# Patient Record
Sex: FEMALE | Race: ASIAN | NOT HISPANIC OR LATINO | Employment: STUDENT | URBAN - METROPOLITAN AREA
[De-identification: names, ages, dates, MRNs, and addresses within clinical notes are randomized per-mention and may not be internally consistent; named-entity substitution may affect disease eponyms.]

---

## 2024-05-30 LAB
ABO (EXTERNAL): ABNORMAL
BLD GP AB SCN SERPL QL: ABNORMAL
C TRACH DNA SPEC QL PROBE+SIG AMP: NEGATIVE
HEMATOCRIT (EXTERNAL): 35.7 % (ref 35.5–44.9)
HEPATITIS C ANTIBODY (EXTERNAL): NONREACTIVE
N GONORRHOEA DNA SPEC QL PROBE+SIG AMP: NEGATIVE
RH (EXTERNAL): POSITIVE
RUBELLA ANTIBODY IGG (EXTERNAL): NORMAL
SPECIMEN DESCRIP: NORMAL
SPECIMEN DESCRIPTION: NORMAL

## 2024-09-18 ENCOUNTER — TELEPHONE (OUTPATIENT)
Dept: OBGYN | Facility: CLINIC | Age: 44
End: 2024-09-18
Payer: COMMERCIAL

## 2024-09-18 NOTE — TELEPHONE ENCOUNTER
[facility]    Phone Message    May a detailed message be left on voicemail: yes     Reason for Call: Other: Pt will be moving to [place] from [place] and she will be 30w at the time that she would start her prenatal care. Per protocols, she would need to be accepted before getting scheduled since she will be past 26w. She will be moving to [place] at the end of October. Please contact pt to help schedule.      Action Taken: Message routed to:  Other: RD OB    Travel Screening: Not Applicable     Date of Service:

## 2024-10-06 ENCOUNTER — HEALTH MAINTENANCE LETTER (OUTPATIENT)
Age: 44
End: 2024-10-06

## 2024-10-22 ENCOUNTER — TRANSFERRED RECORDS (OUTPATIENT)
Dept: HEALTH INFORMATION MANAGEMENT | Facility: CLINIC | Age: 44
End: 2024-10-22
Payer: COMMERCIAL

## 2024-10-22 LAB
HEMOGLOBIN (EXTERNAL): 11.4 G/DL (ref 12–16)
HEPATITIS B SURFACE ANTIGEN (EXTERNAL): NEGATIVE
HIV1+2 AB SERPL QL IA: NEGATIVE
PLATELET COUNT (EXTERNAL): 303 10E3/UL (ref 157–371)
VDRL (SYPHILIS) (EXTERNAL): NEGATIVE

## 2024-10-25 ENCOUNTER — TRANSFERRED RECORDS (OUTPATIENT)
Dept: HEALTH INFORMATION MANAGEMENT | Facility: CLINIC | Age: 44
End: 2024-10-25
Payer: COMMERCIAL

## 2024-10-26 ENCOUNTER — MYC MEDICAL ADVICE (OUTPATIENT)
Dept: OBGYN | Facility: CLINIC | Age: 44
End: 2024-10-26
Payer: COMMERCIAL

## 2024-10-28 ENCOUNTER — VIRTUAL VISIT (OUTPATIENT)
Dept: OBGYN | Facility: CLINIC | Age: 44
End: 2024-10-28
Attending: OBSTETRICS & GYNECOLOGY
Payer: COMMERCIAL

## 2024-10-28 DIAGNOSIS — O36.5990: Primary | ICD-10-CM

## 2024-10-28 PROCEDURE — 99207 PR NO BILLABLE SERVICE THIS VISIT: CPT | Mod: 93

## 2024-10-28 RX ORDER — ASPIRIN 81 MG/1
81 TABLET, CHEWABLE ORAL DAILY
COMMUNITY

## 2024-10-28 RX ORDER — HUMAN INSULIN 100 [IU]/ML
8 INJECTION, SUSPENSION SUBCUTANEOUS AT BEDTIME
COMMUNITY
Start: 2024-10-08 | End: 2024-11-04

## 2024-10-28 RX ORDER — LEVOTHYROXINE SODIUM 112 UG/1
112 TABLET ORAL DAILY
COMMUNITY
Start: 2024-07-18 | End: 2024-11-05

## 2024-10-28 NOTE — PROGRESS NOTES
Left message for patient to let her know that an US order was placed.  She can call [phone] to schedule

## 2024-10-28 NOTE — LETTER
10/28/2024       RE: Ravi Peñaloza  [address]  [place] [address]     Dear Colleague,    Thank you for referring your patient, Ravi Peñaloza, to the [facility]. Please see a copy of my visit note below.    [facility] RN Transfer of Care Intake note    44 year old female newly pregnant.  LMP: 24.    exact and regular cycles  Pregnancy is planned.    Patient is transferring care from: [facility] and has had 6 prenatal visits with this clinic. Gestational age at first OB visit with this pregnancy was 7 weeks    The reason the patient is transferring care is: moved to [place]    Partner/support person name and relationship - , Stiven Rubi.        Symptoms since LMP include nausea and vomiting. Patient has tried these relief   measures: increased rest.      OB HISTORY  OB History    Para Term  AB Living   1 0 0 0 0 0   SAB IAB Ectopic Multiple Live Births   0 0 0 0 0      # Outcome Date GA Lbr Se/2nd Weight Sex Type Anes PTL Lv   1 Current               Obstetric Comments   *First Pregnancy           OB COMPLICATIONS  *First Pregnancy       PERSONAL/SOCIAL HISTORY    lives with their spouse.  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  Job involves sedentary activity .  Her partner works as a researcher at [employer] in dept of surgery.     MENTAL HEALTH HISTORY:  none      - Current Medications    Current Outpatient Medications   Medication Sig Dispense Refill     insulin NPH (NOVOLIN N FLEXPEN) 100 UNIT/ML injection Inject 8 Units subcutaneously at bedtime.       levothyroxine (SYNTHROID/LEVOTHROID) 112 MCG tablet Take 112 mcg by mouth daily. Plus additional 1/2 tab weekly       Prenatal MV-Min-Fe Fum-FA-DHA (PRENATAL MULTIVITAMIN PLUS DHA PO) Take 1 tablet by mouth daily.       aspirin (ASA) 81 MG chewable tablet Take 81 mg by mouth daily.              - Co-morbids    Past Medical History:   Diagnosis Date     Fibroid uterus      Gestational diabetes      Hypothyroidism      Migraine without aura and without status migrainosus, not intractable        - Pre pregnancy weight 189 pounds    - Genetic/Infection questionnaire completed, risks include  with sickle cell trait.  AMA. Discussed genetic screening options, patient has completed first trimester genetic screening  Pt  does not have a recent known exposure to Parvo or CMV so IgG/IgM testing WILL NOT be ordered.   - Labs already drawn this pregnancy include: CBC, HBsAG, Syphilis AB, TSH, GTT, AFP, NIPT, HIV 1&2, chlamydia/Gonorrhea,   - Ultrasound done at 8w0d weeks gestation on 5/30/24  Flu Vaccine.  Patient declined, will consider next visit  COVID Vaccine: initial series plus one booster.  Had covid illness in 2021 without complications  RHogam: NA  Tdap: not yet this pregnancy  - Discussed expectations for routine prenatal care and scheduling.  - Discussed highlights from The Expectant Family booklet on warning signs, safe pregnancy and prevention of infections diseases, nutrition and exercise.  - Patient was encouraged to start prenatal vitamins as tolerated, if experiencing nausea and vomiting then OK to switch to folic acid only at this time.    - Additional items: None  - Reconciled and reviewed problem list  - Pt scheduled for NOB on 11/5/24    Allegra Sheets RN            Left message for patient to let her know that an US order was placed.  She can call [phone] to schedule      Again, thank you for allowing me to participate in the care of your patient.      Sincerely,    [facility] N[facility]UC[facility]

## 2024-10-28 NOTE — PROGRESS NOTES
[facility] RN Transfer of Care Intake note    44 year old female newly pregnant.  LMP: 24.    exact and regular cycles  Pregnancy is planned.    Patient is transferring care from: [facility] and has had 6 prenatal visits with this clinic. Gestational age at first OB visit with this pregnancy was 7 weeks    The reason the patient is transferring care is: moved to [place]    Partner/support person name and relationship - , Stiven Rubi.        Symptoms since LMP include nausea and vomiting. Patient has tried these relief   measures: increased rest.      OB HISTORY  OB History    Para Term  AB Living   1 0 0 0 0 0   SAB IAB Ectopic Multiple Live Births   0 0 0 0 0      # Outcome Date GA Lbr Se/2nd Weight Sex Type Anes PTL Lv   1 Current               Obstetric Comments   *First Pregnancy           OB COMPLICATIONS  *First Pregnancy       PERSONAL/SOCIAL HISTORY    lives with their spouse.  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  Job involves sedentary activity .  Her partner works as a researcher at [employer] in dept of surgery.     MENTAL HEALTH HISTORY:  none      - Current Medications    Current Outpatient Medications   Medication Sig Dispense Refill    insulin NPH (NOVOLIN N FLEXPEN) 100 UNIT/ML injection Inject 8 Units subcutaneously at bedtime.      levothyroxine (SYNTHROID/LEVOTHROID) 112 MCG tablet Take 112 mcg by mouth daily. Plus additional 1/2 tab weekly      Prenatal MV-Min-Fe Fum-FA-DHA (PRENATAL MULTIVITAMIN PLUS DHA PO) Take 1 tablet by mouth daily.      aspirin (ASA) 81 MG chewable tablet Take 81 mg by mouth daily.             - Co-morbids    Past Medical History:   Diagnosis Date    Fibroid uterus     Gestational diabetes     Hypothyroidism     Migraine without aura and without status migrainosus, not intractable        - Pre pregnancy weight 189 pounds    - Genetic/Infection questionnaire completed, risks include  with sickle cell  trait.  AMA. Discussed genetic screening options, patient has completed first trimester genetic screening  Pt  does not have a recent known exposure to Parvo or CMV so IgG/IgM testing WILL NOT be ordered.   - Labs already drawn this pregnancy include: CBC, HBsAG, Syphilis AB, TSH, GTT, AFP, NIPT, HIV 1&2, chlamydia/Gonorrhea,   - Ultrasound done at 8w0d weeks gestation on 5/30/24  Flu Vaccine.  Patient declined, will consider next visit  COVID Vaccine: initial series plus one booster.  Had covid illness in 2021 without complications  RHogam: NA  Tdap: not yet this pregnancy  - Discussed expectations for routine prenatal care and scheduling.  - Discussed highlights from The Expectant Family booklet on warning signs, safe pregnancy and prevention of infections diseases, nutrition and exercise.  - Patient was encouraged to start prenatal vitamins as tolerated, if experiencing nausea and vomiting then OK to switch to folic acid only at this time.    - Additional items: None  - Reconciled and reviewed problem list  - Pt scheduled for NOB on 11/5/24    Allegra Sheets RN

## 2024-10-29 ENCOUNTER — TRANSCRIBE ORDERS (OUTPATIENT)
Dept: MATERNAL FETAL MEDICINE | Facility: CLINIC | Age: 44
End: 2024-10-29
Payer: COMMERCIAL

## 2024-10-29 ENCOUNTER — PRE VISIT (OUTPATIENT)
Dept: MATERNAL FETAL MEDICINE | Facility: CLINIC | Age: 44
End: 2024-10-29
Payer: COMMERCIAL

## 2024-10-29 DIAGNOSIS — O26.90 PREGNANCY RELATED CONDITION, ANTEPARTUM: Primary | ICD-10-CM

## 2024-10-30 ENCOUNTER — HOSPITAL ENCOUNTER (OUTPATIENT)
Dept: ULTRASOUND IMAGING | Facility: CLINIC | Age: 44
Discharge: HOME | End: 2024-10-30
Attending: OBSTETRICS & GYNECOLOGY
Payer: COMMERCIAL

## 2024-10-30 ENCOUNTER — OFFICE VISIT (OUTPATIENT)
Dept: MATERNAL FETAL MEDICINE | Facility: CLINIC | Age: 44
End: 2024-10-30
Attending: OBSTETRICS & GYNECOLOGY
Payer: COMMERCIAL

## 2024-10-30 ENCOUNTER — TELEPHONE (OUTPATIENT)
Dept: OBGYN | Facility: CLINIC | Age: 44
End: 2024-10-30

## 2024-10-30 VITALS — SYSTOLIC BLOOD PRESSURE: 125 MMHG | DIASTOLIC BLOOD PRESSURE: 81 MMHG

## 2024-10-30 DIAGNOSIS — D25.9 UTERINE LEIOMYOMA, UNSPECIFIED LOCATION: ICD-10-CM

## 2024-10-30 DIAGNOSIS — O24.414 INSULIN CONTROLLED GESTATIONAL DIABETES MELLITUS (GDM) IN THIRD TRIMESTER: ICD-10-CM

## 2024-10-30 DIAGNOSIS — O09.513 PRIMIGRAVIDA OF ADVANCED MATERNAL AGE IN THIRD TRIMESTER: ICD-10-CM

## 2024-10-30 DIAGNOSIS — E03.9 HYPOTHYROIDISM: ICD-10-CM

## 2024-10-30 DIAGNOSIS — O99.210 OBESITY DURING PREGNANCY: ICD-10-CM

## 2024-10-30 DIAGNOSIS — O36.5990 FETAL GROWTH RESTRICTION ANTEPARTUM: Primary | ICD-10-CM

## 2024-10-30 DIAGNOSIS — O26.90 PREGNANCY RELATED CONDITION, ANTEPARTUM: ICD-10-CM

## 2024-10-30 DIAGNOSIS — O24.414 INSULIN CONTROLLED GESTATIONAL DIABETES MELLITUS (GDM) IN THIRD TRIMESTER: Primary | ICD-10-CM

## 2024-10-30 DIAGNOSIS — O99.283 HYPOTHYROIDISM AFFECTING PREGNANCY IN THIRD TRIMESTER: ICD-10-CM

## 2024-10-30 DIAGNOSIS — E03.9 HYPOTHYROIDISM AFFECTING PREGNANCY IN THIRD TRIMESTER: ICD-10-CM

## 2024-10-30 PROCEDURE — 76811 OB US DETAILED SNGL FETUS: CPT | Mod: 26 | Performed by: STUDENT IN AN ORGANIZED HEALTH CARE EDUCATION/TRAINING PROGRAM

## 2024-10-30 PROCEDURE — 76820 UMBILICAL ARTERY ECHO: CPT | Mod: 26 | Performed by: STUDENT IN AN ORGANIZED HEALTH CARE EDUCATION/TRAINING PROGRAM

## 2024-10-30 PROCEDURE — 59025 FETAL NON-STRESS TEST: CPT

## 2024-10-30 PROCEDURE — 99203 OFFICE O/P NEW LOW 30 MIN: CPT | Mod: 25 | Performed by: STUDENT IN AN ORGANIZED HEALTH CARE EDUCATION/TRAINING PROGRAM

## 2024-10-30 PROCEDURE — 59025 FETAL NON-STRESS TEST: CPT | Mod: 26 | Performed by: STUDENT IN AN ORGANIZED HEALTH CARE EDUCATION/TRAINING PROGRAM

## 2024-10-30 PROCEDURE — 76820 UMBILICAL ARTERY ECHO: CPT

## 2024-10-30 NOTE — NURSING NOTE
Patient reports positive fetal movement, denies contractions, leaking of fluid, or bleeding.  Reports blood sugar values fasting wnl and hr post prandial wnl. Has had difficulty checking all pp while she's been in a conference this week. Patient states she has been followed by endocrine for thyroid as well as diabetes but does not have new endocrine provider set up. Education provided to patient on today's ultrasound.  SBAR given to DANIA IVY, see their note in Epic.  Call placed to [facility] triage RN to request endocrine referral for patient.  NST Performed due to fetal growth restriction.  Dr. Le reviewed efm tracing. See NST/BPP Doc Flowsheet tab.

## 2024-10-30 NOTE — TELEPHONE ENCOUNTER
29w6d today. Telma from [facility] called. She states the patient needs a referral to Endocrine ASAP for hypothyroidism and Insulin-dependent gestational diabetes. She is a ERNIE at 30w6d on 11/5/24 with Dr. Trevizo. But [facility] would like the referral sent now to get the patient in. Will send message to Dr. Trevizo for advise.

## 2024-10-30 NOTE — PROGRESS NOTES
The patient was seen for an ultrasound in the [facility] today.  For a detailed report of the ultrasound examination, please see the ultrasound report which can be found under the imaging tab.    If you have questions regarding today's evaluation or if we can be of further service, please contact the [facility].    Claudette Le MD  , OB/GYN  Maternal-Fetal Medicine

## 2024-11-01 ENCOUNTER — TELEPHONE (OUTPATIENT)
Dept: ENDOCRINOLOGY | Facility: CLINIC | Age: 44
End: 2024-11-01

## 2024-11-01 DIAGNOSIS — O24.419 GESTATIONAL DIABETES: Primary | ICD-10-CM

## 2024-11-04 ENCOUNTER — TELEPHONE (OUTPATIENT)
Dept: ENDOCRINOLOGY | Facility: CLINIC | Age: 44
End: 2024-11-04

## 2024-11-04 ENCOUNTER — VIRTUAL VISIT (OUTPATIENT)
Dept: EDUCATION SERVICES | Facility: CLINIC | Age: 44
End: 2024-11-04
Payer: COMMERCIAL

## 2024-11-04 DIAGNOSIS — O24.419 GESTATIONAL DIABETES: ICD-10-CM

## 2024-11-04 PROCEDURE — 98968 PH1 ASSMT&MGMT NQHP 21-30: CPT | Mod: 95 | Performed by: DIETITIAN, REGISTERED

## 2024-11-04 RX ORDER — HUMAN INSULIN 100 [IU]/ML
8 INJECTION, SUSPENSION SUBCUTANEOUS AT BEDTIME
Qty: 15 ML | Refills: 3 | Status: SHIPPED | OUTPATIENT
Start: 2024-11-04 | End: 2024-11-05

## 2024-11-04 NOTE — PATIENT INSTRUCTIONS
Goals for Gestational Diabetes Care:    1. Eat balanced meals (3 meals + 3 snacks daily)    Breakfast: 30 grams carbohydrate + Protein  Snack: 15-30 grams carbohydrate + protein  Lunch: 45-60 grams carbohydrate + protein/vegetables  Snack: 15-30 grams Carbohydrate + protein  Dinner: 45-60 grams carbohydrate + protein/vegetables  Bedtime Snack: 15-30 grams + protein    ----Make sure you include protein source with each meal and at bedtime - this has been shown to help with blood glucose elevations    2. Each Morning Check Ketones (small/mod/high - call Diabetes Care Line)  Your goal is negative or trace ketones. If you have ketones in your urine it means you are not eating enough before you go to bed. Eat a larger bedtime snack and include protein.     3. Aim to get at least 30 minutes of activity each day. Activity really helps improve blood sugars.     4. Blood Glucose Targets:   1. Fasting Less than 95 mg/dL   2. 1 hours after a meal target is less than 140 mg/dL  ----Always remember to bring meter and log book to all appointments.    5. NPH at 8 units before bed - no changes    Follow up with your Diabetic Educator to assess BG targets in 1 week.  If Blood glucose levels are above normal 3 times or more in one week and you cannot explain them or if you develop small, moderate or high ketones call Diabetes Care at [phone]    Call with any questions.    Thank you,  Miranda Sher RDN, LD, [facility]   Certified Diabetes Care &   [phone]

## 2024-11-04 NOTE — TELEPHONE ENCOUNTER
Patient confirmed scheduled appointment:  Date: 11/5   Time: 9 am   Visit type: new gdm   Provider: Elli   Location: [facility]  Testing/imaging:   Additional notes: Spoke to pt and scheduled soonest avail appt per below triage   Vani Sanchez RN  [facility]; Miranda Calloway MD  CCs: Please help schedule per GDM and Thyroid protocol. CDE referral in place. Transfer of care from [facility].    Dr Calloway, Are you willing to add on as urgent next week? If so, when?    Thank you.  Vani Sanchez RN on 11/1/24 at 10:00 AM    Claudette Espinoza on 11/4/2024 at 9:40 AM

## 2024-11-04 NOTE — CONFIDENTIAL NOTE
RECORDS RECEIVED FROM: internal    DATE RECEIVED: 11.5.24    NOTES (FOR ALL VISITS) STATUS DETAILS   OFFICE NOTES from referring provider internal    Vandana Trevizo MD      MEDICATION LIST internal     LABS     DIABETES: HBGA1C, CREATININE, FASTING LIPIDS, MICROALBUMIN URINE, POTASSIUM, TSH, T4    THYROID: TSH, T4, CBC, THYRODLONULIN, TOTAL T3, FREE T4, CALCITONIN, CEA internal /ce    TSH/T4- 10.20.24  HBGA1C- 6.24.24  Lipid- 6.24.24  Cmp-6.24.24   Cbc- 6.24.24

## 2024-11-04 NOTE — TELEPHONE ENCOUNTER
Spoke with patient. Pt has not been checking blood sugars as regularly due to traveling. Pt states she will send what BG readings she has via [employer]. Jennifer Shipley CMA on 11/4/2024 at 10:18 AM

## 2024-11-04 NOTE — PROGRESS NOTES
Outcome for 11/04/24 10:16 AM: [employer] message sent  Jennifer Shipley MA  Outcome for 11/04/24 10:16 AM: Per patient, will send BG readings via [employer]- Pt has been traveling and states she will not have 14 days but will send what she does have.  Jennifer Shipley MA  Outcome for 11/04/24 2:45 PM: Glucose readings sent via [employer]  Jennifer Shipley MA

## 2024-11-04 NOTE — PROGRESS NOTES
Diabetes Self-Management Education & Support  Type of service:  Video Visit    If the video visit is dropped, the video visit invitation should be resent by: Text to cell phone: [phone]    Originating Location (pt. Location): Home  Distant Location (provider location): Offsite  Mode of Communication:  Video Conference via "[employer]"    Video Start Time:  08:03 AM  Video End Time (time video stopped): 08:24 AM    How would patient like to obtain AVS? Robynhart    SUBJECTIVE/OBJECTIVE:  Presents for education related to gestational diabetes.    Accompanied by: Self  Gestational weeks: 30w4d  Next OB Visit Date: 11/05/24  Number of previous pregnancies: 0  Had any babies over 9 lbs: No  Previously had Gestational Diabetes: No  Have you ever had thyroid problems or taken thyroid medication?: (!) Yes  Heart disease, mitral valve prolapse or rheumatic fever?: No  Hypertension : No  High Cholesterol: No  High Triglycerides: No  Do you use tobacco products?: No  Do you drink beer, wine or hard liquor?: No    Cultural Influences/Ethnic Background:  Not  or     LMP 04/04/2024 (Exact Date)     Weight gain not assessed    Estimated Date of Delivery: Jan 9, 2025    Blood Glucose/Ketone Log:   GDM BG LOG:  DATE Ketones Fasting BG (mg/dL) 1 hour Post Breakfast BG (mg/dL) 1 hour Post Lunch BG (mg/dL) 1 hour post Dinner BG (mg/dL)   11/4 - 83 - - -   11/3 - - - - 106       Lifestyle and Health Behaviors:  Exercise:: Yes  Days per week of moderate to strenuous exercise (like a brisk walk): 7 (20-30 minute walk after each meal)  On average, minutes per day of exercise at this level: 10  How intense was your typical exercise? : Moderate (like brisk walking)  Exercise Minutes per Week: 70  Meal planning/habits: None  Meals include: Breakfast, Lunch, Dinner  Breakfast: eggs and egg whites at meals  Lunch: chicken with flat breads- homemade with chickpea and whole wheat flour OR whole wheat tortillas. Salads and bell  peppers, spring onions  Dinner: chicken with flat breads- homemade with chickpea and whole wheat flour OR whole wheat tortillas. Salads and bell peppers, spring onions  Snacks: sometimes-crackers and almonds.  Beverages: Water  Biggest challenges to healthy eating: None  Pre- vitamin?: Yes  Supplements?: Yes  List supplements currently taking: DHA. baby aspirin 81 mg and thyroids meds  Experiencing nausea?: No  Experiencing heartburn?: No    Healthy Coping:  Emotional response to diabetes: Ready to learn  Informal Support system:: None  Stage of change: ACTION (Actively working towards change)    Current Management:  Taking medications for gestational diabetes?: Yes  Difficulty affording diabetes testing supplies?: No    ASSESSMENT:  Ketones: not checking.   Fasting blood glucoses: 100% in target.  After breakfast: -% in target.  After lunch: -% in target.  After dinner: 100% in target.    Met with Ravi and she is here in [place] now after moving from [place]. She is a Doctoral student in public health here. She is on NPH 8 units/day and her fasting glucose has been well controlled. She reports working with an endocrinologist in [place] who was managing her thyroid medications. She had a dietitian managing her glucose.    Patient apologizing for not having a lot of glucose numbers, she moved and was at a conference in [place] for a week. She reports that her fasting numbers are well controled with the 8 units of NPH. However, her meal time numbers have been well controlled under 140 mg/dL with nutrition choices and activity - she has been walking 20-30 minutes after each meal.     INTERVENTION:  Educational topics covered today:  What to expect after delivery, Future testing for Type 2 diabetes (2 hour OGTT at 6 week post-partum check-up and annual fasting blood glucose level), Risk of GDM and planning ahead for future pregnancies, Recommended lifestyle interventions for reducing the risk of Type 2 Diabetes, When  to Call a Diabetes Educator or OB Provider    Educational Materials provided today:  [employer] Preventing Diabetes    PLAN:  Check glucose 4 times daily.  Check ketones once a week when readings are consistently negative.  Continue with recommended physical activity.  Continue to follow recommended meal plan: 30 carbs at breakfast, 60 carbs at lunch, 60 carbs at supper, 15-30 carbs at snacks.  Follow consistent CHO meal plan, eat CHO and protein/fat at all meals/snacks.    Call/e-mail/MyChart message diabetes educator if 3 or more blood sugars are above the goal in 1 week or if ketones are positive.    Time Spent: 21 minutes  Encounter Type: Individual    Any diabetes medication dose changes were made via the CDE Protocol and Collaborative Practice Agreement with the patient's referring provider. A copy of this encounter was shared with the provider.

## 2024-11-04 NOTE — LETTER
11/4/2024         RE: Ravi Peñaloza  [address]  [place] [address]        Dear Colleague,    Thank you for referring your patient, Ravi Peñaloza, to the [facility]. Please see a copy of my visit note below.    Diabetes Self-Management Education & Support  Type of service:  Video Visit    If the video visit is dropped, the video visit invitation should be resent by: Text to cell phone: [phone]    Originating Location (pt. Location): Home  Distant Location (provider location): Offsite  Mode of Communication:  Video Conference via [employer]    Video Start Time:  08:03 AM  Video End Time (time video stopped): 08:24 AM    How would patient like to obtain AVS? MyChart    SUBJECTIVE/OBJECTIVE:  Presents for education related to gestational diabetes.    Accompanied by: Self  Gestational weeks: 30w4d  Next OB Visit Date: 11/05/24  Number of previous pregnancies: 0  Had any babies over 9 lbs: No  Previously had Gestational Diabetes: No  Have you ever had thyroid problems or taken thyroid medication?: (!) Yes  Heart disease, mitral valve prolapse or rheumatic fever?: No  Hypertension : No  High Cholesterol: No  High Triglycerides: No  Do you use tobacco products?: No  Do you drink beer, wine or hard liquor?: No    Cultural Influences/Ethnic Background:  Not  or     LMP 04/04/2024 (Exact Date)     Weight gain not assessed    Estimated Date of Delivery: Jan 9, 2025    Blood Glucose/Ketone Log:   GDM BG LOG:  DATE Ketones Fasting BG (mg/dL) 1 hour Post Breakfast BG (mg/dL) 1 hour Post Lunch BG (mg/dL) 1 hour post Dinner BG (mg/dL)   11/4 - 83 - - -   11/3 - - - - 106       Lifestyle and Health Behaviors:  Exercise:: Yes  Days per week of moderate to strenuous exercise (like a brisk walk): 7 (20-30 minute walk after each meal)  On average, minutes per day of exercise at this level: 10  How intense was your typical exercise? : Moderate (like brisk  walking)  Exercise Minutes per Week: 70  Meal planning/habits: None  Meals include: Breakfast, Lunch, Dinner  Breakfast: eggs and egg whites at meals  Lunch: chicken with flat breads- homemade with chickpea and whole wheat flour OR whole wheat tortillas. Salads and bell peppers, spring onions  Dinner: chicken with flat breads- homemade with chickpea and whole wheat flour OR whole wheat tortillas. Salads and bell peppers, spring onions  Snacks: sometimes-crackers and almonds.  Beverages: Water  Biggest challenges to healthy eating: None  Pre-linnea vitamin?: Yes  Supplements?: Yes  List supplements currently taking: DHA. baby aspirin 81 mg and thyroids meds  Experiencing nausea?: No  Experiencing heartburn?: No    Healthy Coping:  Emotional response to diabetes: Ready to learn  Informal Support system:: None  Stage of change: ACTION (Actively working towards change)    Current Management:  Taking medications for gestational diabetes?: Yes  Difficulty affording diabetes testing supplies?: No    ASSESSMENT:  Ketones: not checking.   Fasting blood glucoses: 100% in target.  After breakfast: -% in target.  After lunch: -% in target.  After dinner: 100% in target.    Met with Ravi and she is here in [place] now after moving from [place]. She is a Doctoral student in public health here. She is on NPH 8 units/day and her fasting glucose has been well controlled. She reports working with an endocrinologist in [place] who was managing her thyroid medications. She had a dietitian managing her glucose.    Patient apologizing for not having a lot of glucose numbers, she moved and was at a conference in [place] for a week. She reports that her fasting numbers are well controled with the 8 units of NPH. However, her meal time numbers have been well controlled under 140 mg/dL with nutrition choices and activity - she has been walking 20-30 minutes after each meal.     INTERVENTION:  Educational topics covered today:  What to expect  after delivery, Future testing for Type 2 diabetes (2 hour OGTT at 6 week post-partum check-up and annual fasting blood glucose level), Risk of GDM and planning ahead for future pregnancies, Recommended lifestyle interventions for reducing the risk of Type 2 Diabetes, When to Call a Diabetes Educator or OB Provider    Educational Materials provided today:  [employer] Preventing Diabetes    PLAN:  Check glucose 4 times daily.  Check ketones once a week when readings are consistently negative.  Continue with recommended physical activity.  Continue to follow recommended meal plan: 30 carbs at breakfast, 60 carbs at lunch, 60 carbs at supper, 15-30 carbs at snacks.  Follow consistent CHO meal plan, eat CHO and protein/fat at all meals/snacks.    Call/e-mail/[employer] message diabetes educator if 3 or more blood sugars are above the goal in 1 week or if ketones are positive.    Time Spent: 21 minutes  Encounter Type: Individual    Any diabetes medication dose changes were made via the CDE Protocol and Collaborative Practice Agreement with the patient's referring provider. A copy of this encounter was shared with the provider.

## 2024-11-05 ENCOUNTER — PRENATAL OFFICE VISIT (OUTPATIENT)
Dept: OBGYN | Facility: CLINIC | Age: 44
End: 2024-11-05
Attending: OBSTETRICS & GYNECOLOGY
Payer: COMMERCIAL

## 2024-11-05 ENCOUNTER — PRE VISIT (OUTPATIENT)
Dept: ENDOCRINOLOGY | Facility: CLINIC | Age: 44
End: 2024-11-05

## 2024-11-05 ENCOUNTER — VIRTUAL VISIT (OUTPATIENT)
Dept: ENDOCRINOLOGY | Facility: CLINIC | Age: 44
End: 2024-11-05
Attending: OBSTETRICS & GYNECOLOGY
Payer: COMMERCIAL

## 2024-11-05 VITALS
HEIGHT: 61 IN | BODY MASS INDEX: 36.06 KG/M2 | DIASTOLIC BLOOD PRESSURE: 87 MMHG | WEIGHT: 191 LBS | SYSTOLIC BLOOD PRESSURE: 134 MMHG | HEART RATE: 87 BPM

## 2024-11-05 DIAGNOSIS — O34.13 UTERINE FIBROIDS AFFECTING PREGNANCY IN THIRD TRIMESTER: ICD-10-CM

## 2024-11-05 DIAGNOSIS — O36.5990 FETAL GROWTH RESTRICTION ANTEPARTUM: ICD-10-CM

## 2024-11-05 DIAGNOSIS — O24.414 INSULIN CONTROLLED GESTATIONAL DIABETES MELLITUS (GDM) IN THIRD TRIMESTER: Primary | ICD-10-CM

## 2024-11-05 DIAGNOSIS — O24.414 INSULIN CONTROLLED GESTATIONAL DIABETES MELLITUS (GDM) IN THIRD TRIMESTER: ICD-10-CM

## 2024-11-05 DIAGNOSIS — O09.513 AMA (ADVANCED MATERNAL AGE) PRIMIGRAVIDA 35+, THIRD TRIMESTER: ICD-10-CM

## 2024-11-05 DIAGNOSIS — E03.9 ACQUIRED HYPOTHYROIDISM: ICD-10-CM

## 2024-11-05 DIAGNOSIS — O09.93 SUPERVISION OF HIGH RISK PREGNANCY IN THIRD TRIMESTER: Primary | ICD-10-CM

## 2024-11-05 DIAGNOSIS — E06.3 HYPOTHYROIDISM DUE TO HASHIMOTO THYROIDITIS: ICD-10-CM

## 2024-11-05 DIAGNOSIS — D25.9 UTERINE FIBROIDS AFFECTING PREGNANCY IN THIRD TRIMESTER: ICD-10-CM

## 2024-11-05 PROCEDURE — 250N000011 HC RX IP 250 OP 636

## 2024-11-05 PROCEDURE — 90715 TDAP VACCINE 7 YRS/> IM: CPT

## 2024-11-05 PROCEDURE — 90471 IMMUNIZATION ADMIN: CPT

## 2024-11-05 PROCEDURE — 99204 OFFICE O/P NEW MOD 45 MIN: CPT | Mod: 95 | Performed by: INTERNAL MEDICINE

## 2024-11-05 PROCEDURE — 99213 OFFICE O/P EST LOW 20 MIN: CPT | Performed by: OBSTETRICS & GYNECOLOGY

## 2024-11-05 PROCEDURE — 99207 PR PRENATAL VISIT: CPT | Mod: GC | Performed by: OBSTETRICS & GYNECOLOGY

## 2024-11-05 RX ORDER — LEVOTHYROXINE SODIUM 112 UG/1
112 TABLET ORAL DAILY
Qty: 100 TABLET | Refills: 0 | Status: SHIPPED | OUTPATIENT
Start: 2024-11-05

## 2024-11-05 RX ORDER — LEVOTHYROXINE SODIUM 88 UG/1
88 TABLET ORAL DAILY
Qty: 90 TABLET | Refills: 3 | Status: SHIPPED | OUTPATIENT
Start: 2024-11-05

## 2024-11-05 RX ORDER — HUMAN INSULIN 100 [IU]/ML
8 INJECTION, SUSPENSION SUBCUTANEOUS AT BEDTIME
Qty: 15 ML | Refills: 3 | Status: SHIPPED | OUTPATIENT
Start: 2024-11-05

## 2024-11-05 ASSESSMENT — EDINBURGH POSTNATAL DEPRESSION SCALE (EPDS)
I HAVE BEEN ABLE TO LAUGH AND SEE THE FUNNY SIDE OF THINGS: AS MUCH AS I ALWAYS COULD
I HAVE FELT SAD OR MISERABLE: NO, NOT AT ALL
I HAVE FELT SCARED OR PANICKY FOR NO GOOD REASON: NO, NOT AT ALL
I HAVE BEEN SO UNHAPPY THAT I HAVE BEEN CRYING: NO, NEVER
I HAVE BEEN SO UNHAPPY THAT I HAVE HAD DIFFICULTY SLEEPING: NOT AT ALL
I HAVE BEEN ANXIOUS OR WORRIED FOR NO GOOD REASON: NO, NOT AT ALL
I HAVE BLAMED MYSELF UNNECESSARILY WHEN THINGS WENT WRONG: NO, NEVER
I HAVE BEEN SO UNHAPPY THAT I HAVE BEEN CRYING: NO, NEVER
I HAVE BEEN ABLE TO LAUGH AND SEE THE FUNNY SIDE OF THINGS: AS MUCH AS I ALWAYS COULD
TOTAL SCORE: 0
THINGS HAVE BEEN GETTING ON TOP OF ME: NO, I HAVE BEEN COPING AS WELL AS EVER
I HAVE BEEN ANXIOUS OR WORRIED FOR NO GOOD REASON: NO, NOT AT ALL
I HAVE BEEN SO UNHAPPY THAT I HAVE HAD DIFFICULTY SLEEPING: NOT AT ALL
I HAVE FELT SCARED OR PANICKY FOR NO GOOD REASON: NO, NOT AT ALL
THINGS HAVE BEEN GETTING ON TOP OF ME: NO, I HAVE BEEN COPING AS WELL AS EVER
THE THOUGHT OF HARMING MYSELF HAS OCCURRED TO ME: NEVER
THE THOUGHT OF HARMING MYSELF HAS OCCURRED TO ME: NEVER
I HAVE BLAMED MYSELF UNNECESSARILY WHEN THINGS WENT WRONG: NO, NEVER
I HAVE LOOKED FORWARD WITH ENJOYMENT TO THINGS: AS MUCH AS I EVER DID
I HAVE LOOKED FORWARD WITH ENJOYMENT TO THINGS: AS MUCH AS I EVER DID
I HAVE FELT SAD OR MISERABLE: NO, NOT AT ALL

## 2024-11-05 NOTE — PROGRESS NOTES
"  Video-Visit Details    Type of service:  Video Visit  Video Start Time: 9:03  Video End Time:9:30  Originating Location (pt. Location): Home, [place]  Distant Location (provider location):  Home  Platform used for Video Visit: Quentin Calloway MD    Ravi Peñaloza is a 44 year old year old female here for evaluation of  diabetes in pregnancy and hypothyroidism via a billable video visit. She was referred by Dr Vandana Trevizo    Currently: 30w5d  Estimated Date of Delivery: Jan 9, 2025  Baby: girl      1) Diabetes Mellitus in pregnancy    Diabetes History:  Diagnosis: No previous history of DM, but was overweight so tested for GDM at 16w and diagnosed with this. Initially managed with dietary changes, in October noted fasting was becoming elevated. Started on NPH 8u at bedtime at that time. Was living in [place] and moved now to [place]. Has been diagnosed with fetal growth restriction (ultrasound 10/30-- EFW 3%)  Hospitalizations: none  Previous Regimens:   Current Regimen: NPH 8u nightly      BG check- AM and 1hr PP   Trends- see glucose log  Am today- 87        2) Hypothyroid  HPI: Diagnosed in 2008- on LT4 since there  Current regimen: 112mcg daily plus 1/2 tablet on Tuesday Ave daily dose 120mcg  Pre-pregnancy- 88mcg    [facility] [place] [facility]  TSH- 0.823  Free T4- 1.14    BP Readings from Last 3 Encounters:   10/30/24 125/81       No results found for: \"A1C\"    Wt Readings from Last 3 Encounters:   No data found for Wt       Current Outpatient Medications   Medication Sig Dispense Refill    acetone urine (KETOSTIX) test strip Check ketones once daily in urine 100 strip 2    aspirin (ASA) 81 MG chewable tablet Take 81 mg by mouth daily.      insulin NPH (NOVOLIN N FLEXPEN) 100 UNIT/ML injection Inject 8 Units subcutaneously at bedtime. 15 mL 3    levothyroxine (SYNTHROID/LEVOTHROID) 112 MCG tablet Take 112 mcg by mouth daily. Plus additional 1/2 tab weekly      Prenatal " MV-Min-Fe Fum-FA-DHA (PRENATAL MULTIVITAMIN PLUS DHA PO) Take 1 tablet by mouth daily.            REVIEW OF SYSTEMS:   ROS: 10 point ROS neg other than the symptoms noted above in the HPI.      EXAM:  Physical Exam (visual exam)  VS:  no vital signs taken for video visit  CONSTITUTIONAL: healthy, alert and NAD, responding appropriately  ENT: normocephalic, no visual evidence of trauma, normal nose and oral mucosa  EYES: conjunctivae and sclerae normal, no exophthalmos or proptosis  THYROID:  no visualized nodules or goiter  LUNGS: no audible wheeze, cough or visible cyanosis, no visible retractions or increased work of breathing  EXTREMITIES: no hand tremors  NEUROLOGY: cranial nerves grossly intact with no obvious deficit.  SKIN:  no visualized skin lesions or rash, no edema visualized  PSYCH: mentation appears normal, normal judgement      ASSESSMENT/PLAN:  (O24.414) Insulin controlled gestational diabetes mellitus (GDM) in third trimester  (E03.9) Hypothyroidism    1) Diabetes Mellitus in pregnancy  - Glucose Control- at goal, continue current regimen. NPH 8u  - Reviewed blood sugar goals in pregnancy. Glucose goals during pregnancy according to the American Diabetes Association:  Fasting glucose 70-95 mg/dL AND  One-hour postprandial glucose 110-140 mg/dL or  Two-hour postprandial glucose 100-120 mg/dL  - Encouraged at least 30min of activity daily.  - She IS/ following with MFM and will bereceiving twice weekly BPP/NST beginning at 32 weeks gestation.  - We reviewed her BG are at target now, no need for additional insulin. Discussed general trend of rise in insulin needs and then stabilization by week 36  - Reviewed if she has new/persistent hypoglycemia, she should notify OB given concern for placental insufficiency  - We reviewed risks of uncontrolled hyperglycemia during pregnancy, including but not limited to: gestational hypertension/preeclampsia, increased cesarian section rate, macrosomia, shoulder  dystocia, still birth, and long term risk of development of diabetes in grown child    At delivery- take insulin as scheduled if planned for induction/     After delivery, stop all insulins.  Continue checking blood sugars once daily at rotating times.   6-12 weeks after delivery, complete 2hr oral glucose tolerance test. Order is in, you need to schedule lab appointment for this time period    I will see you back after that test is complete to interpret the results and discuss next steps.   Please let me know if you have any questions in the meantime or you see elevations in your daily blood sugar checks.     Glucose goals according to the American Diabetes Association (non-pregnancy):  --Pre-meal (including fasting, before meals):  mg/dl  --2 hours after eating: <180 mg/dl, ideally 100-150 mg/dl    2) Hypothyroidism  - At goal based on recent TFTs  - Continue current regimen 112mcg daily plus 1/2 tab on Tuesday  - After delivery, restart pre-pregnancy regimen at 88mcg  - Retest levels 6 weeks post partum    RTC- 6 weeks post partum. Follow weekly with diabetes educator.      A total of 40 minutes were spent today 24 on this visit including chart review, history and counseling, documentation and other activities as detailed above.

## 2024-11-05 NOTE — LETTER
11/5/2024       RE: Ravi Peñaloza  [address]  [place] [address]     Dear Colleague,    Thank you for referring your patient, Ravi Peñaloza, to the [facility] [place] at [facility]. Please see a copy of my visit note below.    Outcome for 11/04/24 10:16 AM: [employer] message sent  Jennifer Shipley MA  Outcome for 11/04/24 10:16 AM: Per patient, will send BG readings via [employer]- Pt has been traveling and states she will not have 14 days but will send what she does have.  Jennifer Shipley MA  Outcome for 11/04/24 2:45 PM: Glucose readings sent via [employer]  Jennifer Shipley MA                Video-Visit Details    Type of service:  Video Visit  Video Start Time: 9:03  Video End Time:9:30  Originating Location (pt. Location): [place], [place]  Distant Location (provider location):  Home  Platform used for Video Visit: Quentin Calloway MD    Ravi Peñaloza is a 44 year old year old female here for evaluation of  diabetes in pregnancy and hypothyroidism via a billable video visit. She was referred by Dr Vandana Trevizo    Currently: 30w5d  Estimated Date of Delivery: Jan 9, 2025  Baby: girl      1) Diabetes Mellitus in pregnancy    Diabetes History:  Diagnosis: No previous history of DM, but was overweight so tested for GDM at 16w and diagnosed with this. Initially managed with dietary changes, in October noted fasting was becoming elevated. Started on NPH 8u at bedtime at that time. Was living in [place] and moved now to [place]. Has been diagnosed with fetal growth restriction (ultrasound 10/30-- EFW 3%)  Hospitalizations: none  Previous Regimens:   Current Regimen: NPH 8u nightly      BG check- AM and 1hr PP   Trends- see glucose log  Am today- 87        2) Hypothyroid  HPI: Diagnosed in 2008- on LT4 since there  Current regimen: 112mcg daily plus 1/2 tablet on Tuesday Ave daily dose 120mcg  Pre-pregnancy-  "88mcg    [facility]20/[facility]- [facility]  TSH- 0.823  Free T4- 1.14    BP Readings from Last 3 Encounters:   10/30/24 125/81       No results found for: \"A1C\"    Wt Readings from Last 3 Encounters:   No data found for Wt       Current Outpatient Medications   Medication Sig Dispense Refill     acetone urine (KETOSTIX) test strip Check ketones once daily in urine 100 strip 2     aspirin (ASA) 81 MG chewable tablet Take 81 mg by mouth daily.       insulin NPH (NOVOLIN N FLEXPEN) 100 UNIT/ML injection Inject 8 Units subcutaneously at bedtime. 15 mL 3     levothyroxine (SYNTHROID/LEVOTHROID) 112 MCG tablet Take 112 mcg by mouth daily. Plus additional 1/2 tab weekly       Prenatal MV-Min-Fe Fum-FA-DHA (PRENATAL MULTIVITAMIN PLUS DHA PO) Take 1 tablet by mouth daily.            REVIEW OF SYSTEMS:   ROS: 10 point ROS neg other than the symptoms noted above in the HPI.      EXAM:  Physical Exam (visual exam)  VS:  no vital signs taken for video visit  CONSTITUTIONAL: healthy, alert and NAD, responding appropriately  ENT: normocephalic, no visual evidence of trauma, normal nose and oral mucosa  EYES: conjunctivae and sclerae normal, no exophthalmos or proptosis  THYROID:  no visualized nodules or goiter  LUNGS: no audible wheeze, cough or visible cyanosis, no visible retractions or increased work of breathing  EXTREMITIES: no hand tremors  NEUROLOGY: cranial nerves grossly intact with no obvious deficit.  SKIN:  no visualized skin lesions or rash, no edema visualized  PSYCH: mentation appears normal, normal judgement      ASSESSMENT/PLAN:  (O24.414) Insulin controlled gestational diabetes mellitus (GDM) in third trimester  (E03.9) Hypothyroidism    1) Diabetes Mellitus in pregnancy  - Glucose Control- at goal, continue current regimen. NPH 8u  - Reviewed blood sugar goals in pregnancy. Glucose goals during pregnancy according to the American Diabetes Association:  Fasting glucose 70-95 mg/dL AND  One-hour " postprandial glucose 110-140 mg/dL or  Two-hour postprandial glucose 100-120 mg/dL  - Encouraged at least 30min of activity daily.  - She IS/ following with MFM and will bereceiving twice weekly BPP/NST beginning at 32 weeks gestation.  - We reviewed her BG are at target now, no need for additional insulin. Discussed general trend of rise in insulin needs and then stabilization by week 36  - Reviewed if she has new/persistent hypoglycemia, she should notify OB given concern for placental insufficiency  - We reviewed risks of uncontrolled hyperglycemia during pregnancy, including but not limited to: gestational hypertension/preeclampsia, increased cesarian section rate, macrosomia, shoulder dystocia, still birth, and long term risk of development of diabetes in grown child    At delivery- take insulin as scheduled if planned for induction/     After delivery, stop all insulins.  Continue checking blood sugars once daily at rotating times.   6-12 weeks after delivery, complete 2hr oral glucose tolerance test. Order is in, you need to schedule lab appointment for this time period    I will see you back after that test is complete to interpret the results and discuss next steps.   Please let me know if you have any questions in the meantime or you see elevations in your daily blood sugar checks.     Glucose goals according to the American Diabetes Association (non-pregnancy):  --Pre-meal (including fasting, before meals):  mg/dl  --2 hours after eating: <180 mg/dl, ideally 100-150 mg/dl    2) Hypothyroidism  - At goal based on recent TFTs  - Continue current regimen 112mcg daily plus 1/2 tab on Tuesday  - After delivery, restart pre-pregnancy regimen at 88mcg  - Retest levels 6 weeks post partum    RTC- 6 weeks post partum. Follow weekly with diabetes educator.      A total of 40 minutes were spent today 24 on this visit including chart review, history and counseling, documentation and other  activities as detailed above.             Again, thank you for allowing me to participate in the care of your patient.      Sincerely,    Miranda Calloway MD

## 2024-11-05 NOTE — PROGRESS NOTES
"[facility]  Return OB Visit    S: Ravi Peñaloza is a 44 year old  at 30w5d by LMP who is here for an OB visit transferring care from [place].  States she is doing well overall.  She is being followed for gestational diabetes, taking 8 units of insulin before bed.  She has been measuring her blood sugars at home, over the last few days these have been normal.  She is also being followed for hypothyroidism and has adjusted her medications as advised by endocrinology.  She reports feeling tired over the last 3 days, which is new for her.  She also has been experiencing carpal tunnel symptoms in both of her hands, left>right.  She wears a brace on both of her wrists while she sleeps, which somewhat helps.  She has a history of migraines prior to pregnancy, has intermittently had headaches that resolve with or without Tylenol.  She otherwise denies vision changes, chest pain, shortness of breath, right upper quadrant pain.  She has been experiencing some swelling in her legs up to her mid shins. Denies vaginal bleeding, vaginal discharge, LOF, contractions.  Reports good fetal movement.      She is wondering if we offer parenting classes.  She is interested in a labor and delivery tour.  She is curious about pediatricians in the area.  Also wondering about what the overall cost of delivery is going to be.    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  Gen: Well-appearing, NAD  Extre: 1-2+ pitting edema in bilateral LE  Fundal Height: 27cm  FHR: 120-130s    A/P:  44 year old  at 30w5d who is here for a return OB visit.    # Prenatal care  - Rh positive, Antibody negative  - RPR negative, HIV negative, Hep C negative, Hep B sAg negative   - Rubella, Hep B " immune  - Immunizations: S/p Tdap today, RSV at 32 weeks. Declines Flu and COVID  - GBS to be performed at 36 weeks    # Genetic screening &  diagnosis  - NIPT low risk, AFP screen negative  - Level 2 US 2024: normal anatomy    # GDMA2  Has been on insulin since 26 weeks. Currently on 8U insulin. Follows with endocrinology.   - Continue checking blood sugar at home   - Planning for twice weekly  surveillance (NST/UA Doppler once per week, biophysical profile once per week) due to GDMA2      # Fetal growth restriction   - US 10/30: growth parameters and estimated fetal weight were consistent with FGR, EFW 5% with AC 3%   - Scheduled for growth US on , planning for fetal growth US q3 + weekly UA dopplers   - Timing of delivery to depend on Doppler findings, amniotic fluid, fetal monitoring, and interval fetal growth  - Discussed diagnosis with patient at length      # Hypothyroidism  Follows with endocrinology.   - At goal based on recent labs   - Continue current regimen 112mcg daily plus 1/2 tab on Tuesday  - After delivery, restart pre-pregnancy regimen at 88mcg  - Retest levels 6 weeks post partum    # Fibroid   Left lateral fibroid measuring 15.6 x 12.6 x 8.6 cm.     # Delivery planning  - Given resources for parenting classes and given pamphlet for L&D tours   - Discussed [facility]   - Given number to [employer] to ask about cost of delivery. Also discussed that she should continue to reach out to her insurance for more information.    Return to clinic in 2 weeks. Discussed return precautions.    Staffed with Dr. Trevizo.    Irena Hanna MD  Obstetrics & Gynecology, PGY-1  5:22 PM 2024    The patient was seen and evaluated with Dr. Hanna.  I have reviewed and edited the above note.     Vandana Trevizo MD, FACOG

## 2024-11-05 NOTE — NURSING NOTE
Current patient location: [address] [place] [address] [facility] [address]    Is the patient currently in the state of [place]? YES    Visit mode:VIDEO    If the visit is dropped, the patient can be reconnected by: VIDEO VISIT: Send to e-mail at: [email or web address]    Will anyone else be joining the visit? NO  (If patient encounters technical issues they should call [phone])    Are changes needed to the allergy or medication list? Pt stated no med changes    Are refills needed on medications prescribed by this physician? NO    Rooming Documentation:  Not applicable    Reason for visit: Consult (NEW GDM- N/ P- Currently using insulin, 30 weeks )    Analia CUMMINGS

## 2024-11-05 NOTE — PATIENT INSTRUCTIONS
Weeks 26 to 30 of Your Pregnancy: Care Instructions  You're starting your last trimester. You'll probably feel your baby moving around more. Your back may ache as your body gets used to your baby's size and length. Take care of yourself, and pay attention to what your body needs.    Talk to your doctor about getting the Tdap shot. It will help protect your  against whooping cough (pertussis). Also ask your doctor about flu and COVID-19 shots if you haven't had them yet. If your blood type is Rh negative, you may be given a shot of Rh immune globulin (such as RhoGAM). It can help prevent problems for your baby.   You may have [place] contractions. They are single or several strong contractions without a pattern. These are practice contractions but not the start of labor.   Be kind to yourself.       Take breaks when you're tired.  Change positions often. Don't sit for too long or stand for too long.  At work, rest during breaks if you can. If you don't get breaks, talk to your doctor about writing a letter to your employer to request them.  Avoid fumes, chemicals, and tobacco smoke.  Be sexual if you want to.       You may be interested in sex, or you may not. Everyone is different.  Sex is okay unless your doctor tells you not to.  Your belly can make it hard to find good positions for sex. [place] and explore.  Watch for signs of  labor.        These signs include:  Menstrual-like cramps. Or you may have pain or pressure in your pelvis that happens in a pattern.  About 6 or more contractions in an hour (even after rest and a glass of water).  A low, dull backache that doesn't go away when you change positions.  An increase or change in vaginal discharge.  Light vaginal bleeding or spotting.  Your water breaking.  Know what to do if you think you are having contractions.       Drink 1 or 2 glasses of water.  Lie down on your left side for at least an hour.  While on your side, feel the top of  "your belly to see if it's tight.  Write down your contractions for an hour. Time how long it is from the start of one contraction to the start of the next.  Call your doctor if you have regular contractions.  Follow-up care is a key part of your treatment and safety. Be sure to make and go to all appointments, and call your doctor if you are having problems. It's also a good idea to know your test results and keep a list of the medicines you take.  Where can you learn more?  Go to https://www.[employer].net/patiented  Enter S999 in the search box to learn more about \"Weeks 26 to 30 of Your Pregnancy: Care Instructions.\"  Current as of: July 10, 2023  Content Version: 14.2 2024 [employer].   Care instructions adapted under license by your healthcare professional. If you have questions about a medical condition or this instruction, always ask your healthcare professional. Healthwise, Incorporated disclaims any warranty or liability for your use of this information.    Weeks 30 to 32 of Your Pregnancy: Care Instructions  Your baby is growing more every day. Its eyes can open and close, and it may have hair on its head. Your baby may sleep 20 to 45 minutes at a time and is more active at certain times.    You should feel your baby move several times every day. Your baby now turns less and kicks more.   This is a good time to tour your hospital or birthing center. You may also want to find childcare if needed.         To ease heartburn   Avoid foods that make your symptoms worse, such as chocolate, spicy foods, and caffeine.  Avoid bending over or lying down after meals.  Do not eat for 2 hours before bedtime.  Take antacids like Tums, but don't take ones that have sodium bicarbonate, magnesium trisilicate, or aspirin.        To care for large, swollen veins (varicose veins)   Try to avoid standing for long periods of time.  Sit with your feet propped up.  Wear support hose.  Get some exercise every day, " "like walking or swimming.  Counting your baby's kicks  Your doctor may ask you to count your baby's movements, such as kicks, flutters, or rolls.    Find a quiet place, and get comfortable. Write down your start time. Count your baby's movements (except hiccups). When your baby has moved 10 times, you can stop counting. Write down how many minutes it took.   If an hour goes by and you don't feel 10 movements, have something to eat or drink. Count for another hour. If you don't feel at least 10 movements in the 2-hour period, call your doctor.   Follow-up care is a key part of your treatment and safety. Be sure to make and go to all appointments, and call your doctor if you are having problems. It's also a good idea to know your test results and keep a list of the medicines you take.  Where can you learn more?  Go to https://www.[employer].net/patiented  Enter X471 in the search box to learn more about \"Weeks 30 to 32 of Your Pregnancy: Care Instructions.\"  Current as of: July 10, 2023  Content Version: 14.2 2024 [employer].   Care instructions adapted under license by your healthcare professional. If you have questions about a medical condition or this instruction, always ask your healthcare professional. Healthwise, Incorporated disclaims any warranty or liability for your use of this information.    Learning About Birth Control After Childbirth  What is birth control?  Birth control is any method used to prevent pregnancy. If you have vaginal sex without birth control, you could get pregnant--even if you haven't started having periods again. You're less likely to get pregnant while breastfeeding, but it's still possible. Finding birth control that works for you can help avoid an unplanned pregnancy.  There are many kinds of birth control. Each has pros and cons. Find what works for you. Talk to your doctor if you've just given birth or are breastfeeding.    Long-acting reversible contraception " "(LARC). These are placed inside your body by a doctor. They can prevent pregnancy for years.  Examples include:  An implant (hormonal).  Copper intrauterine device (IUD).  Hormonal IUDs.   Short-acting hormonal methods. These release hormones. Examples include:  Combination birth control pills (\"the pill\").  Skin patches.  A vaginal ring.  A shot.  Mini-pills. Choose progestin-only options soon after giving birth.     Barrier methods. Use these every time you have vaginal sex.  Examples include:  External (male) condoms.  Internal (female) condoms.  Diaphragms.  Cervical caps.  Sponges.   Spermicides. These kill sperm or stop sperm from moving. They can be gels, creams, foams, films, or tablets. Use them before vaginal sex.  Examples include:  Nonoxynol-9.  pH regulator gel.     Permanent birth control (sterilization). This can be an option if you're sure that you don't want to get pregnant later.  Examples include:  Vasectomy.  Having tubes tied (tubal ligation).   Emergency contraception. This is a backup method. Use it if you didn't use birth control or your birth control method failed.  Examples include:  Copper and hormonal IUDs.  Emergency contraceptive pills.     Fertility awareness. You'll learn when you are most likely to become pregnant (are fertile). You can avoid vaginal sex at that time.  It's also called:  Natural family planning.  The rhythm method.   Breastfeeding. This is most effective when all of these are true:  Your baby is younger than 6 months old.  You're breastfeeding and not bottle-feeding at all.  You aren't having periods.   Follow-up care is a key part of your treatment and safety. Be sure to make and go to all appointments, and call your doctor if you are having problems. It's also a good idea to know your test results and keep a list of the medicines you take.  Where can you learn more?  Go to https://www.healthwise.net/patiented  Enter X408 in the search box to learn more about " "\"Learning About Birth Control After Childbirth.\"  Current as of: July 10, 2023  Content Version: 14.2 2024 [facility] [employer], [facility].   Care instructions adapted under license by your healthcare professional. If you have questions about a medical condition or this instruction, always ask your healthcare professional. Healthwise, Incorporated disclaims any warranty or liability for your use of this information.    "

## 2024-11-06 ENCOUNTER — OFFICE VISIT (OUTPATIENT)
Dept: MATERNAL FETAL MEDICINE | Facility: CLINIC | Age: 44
End: 2024-11-06
Attending: OBSTETRICS & GYNECOLOGY
Payer: COMMERCIAL

## 2024-11-06 ENCOUNTER — HOSPITAL ENCOUNTER (OUTPATIENT)
Dept: ULTRASOUND IMAGING | Facility: CLINIC | Age: 44
Discharge: HOME | End: 2024-11-06
Attending: OBSTETRICS & GYNECOLOGY
Payer: COMMERCIAL

## 2024-11-06 VITALS — SYSTOLIC BLOOD PRESSURE: 133 MMHG | DIASTOLIC BLOOD PRESSURE: 83 MMHG

## 2024-11-06 DIAGNOSIS — O36.5990 FETAL GROWTH RESTRICTION ANTEPARTUM: ICD-10-CM

## 2024-11-06 DIAGNOSIS — O36.5990 FETAL GROWTH RESTRICTION ANTEPARTUM: Primary | ICD-10-CM

## 2024-11-06 PROCEDURE — 76815 OB US LIMITED FETUS(S): CPT | Mod: 26 | Performed by: OBSTETRICS & GYNECOLOGY

## 2024-11-06 PROCEDURE — 59025 FETAL NON-STRESS TEST: CPT

## 2024-11-06 PROCEDURE — 76820 UMBILICAL ARTERY ECHO: CPT | Mod: 26 | Performed by: OBSTETRICS & GYNECOLOGY

## 2024-11-06 PROCEDURE — 76820 UMBILICAL ARTERY ECHO: CPT

## 2024-11-06 PROCEDURE — 59025 FETAL NON-STRESS TEST: CPT | Mod: 26 | Performed by: OBSTETRICS & GYNECOLOGY

## 2024-11-06 NOTE — NURSING NOTE
Patient reports positive fetal movement, denies contractions, leaking of fluid, or bleeding.  Reports blood sugar values fasting and hr post prandial wnl.  Blood pressure today 133/83. Patient states is was the same yesterday at her OB appointment but is normally under 120/80's. Patient denies headache, visual changes, nausea/vomiting, epigastric pain related to preeclampsia.  Education provided to patient on today's ultrasound.  SBAR given to MFARIANE IVY, see their note in Epic.    NST Performed due to FGR.  Dr. Moore reviewed efm tracing. See NST/BPP Doc Flowsheet tab.

## 2024-11-07 ENCOUNTER — MYC MEDICAL ADVICE (OUTPATIENT)
Dept: OBGYN | Facility: CLINIC | Age: 44
End: 2024-11-07
Payer: COMMERCIAL

## 2024-11-07 PROBLEM — D25.9 UTERINE FIBROIDS AFFECTING PREGNANCY IN THIRD TRIMESTER: Status: ACTIVE | Noted: 2024-11-07

## 2024-11-07 PROBLEM — O36.5990 FETAL GROWTH RESTRICTION ANTEPARTUM: Status: ACTIVE | Noted: 2024-11-07

## 2024-11-07 PROBLEM — O34.13 UTERINE FIBROIDS AFFECTING PREGNANCY IN THIRD TRIMESTER: Status: ACTIVE | Noted: 2024-11-07

## 2024-11-07 PROBLEM — O09.513 AMA (ADVANCED MATERNAL AGE) PRIMIGRAVIDA 35+, THIRD TRIMESTER: Status: ACTIVE | Noted: 2024-11-07

## 2024-11-07 PROBLEM — O24.414 INSULIN CONTROLLED GESTATIONAL DIABETES MELLITUS (GDM) IN THIRD TRIMESTER: Status: ACTIVE | Noted: 2024-11-07

## 2024-11-07 PROBLEM — E03.9 ACQUIRED HYPOTHYROIDISM: Status: ACTIVE | Noted: 2024-11-07

## 2024-11-07 PROBLEM — O09.93 SUPERVISION OF HIGH RISK PREGNANCY IN THIRD TRIMESTER: Status: ACTIVE | Noted: 2024-11-07

## 2024-11-08 ENCOUNTER — LAB (OUTPATIENT)
Dept: LAB | Facility: CLINIC | Age: 44
End: 2024-11-08
Attending: STUDENT IN AN ORGANIZED HEALTH CARE EDUCATION/TRAINING PROGRAM
Payer: COMMERCIAL

## 2024-11-08 ENCOUNTER — ALLIED HEALTH/NURSE VISIT (OUTPATIENT)
Dept: OBGYN | Facility: CLINIC | Age: 44
End: 2024-11-08
Attending: STUDENT IN AN ORGANIZED HEALTH CARE EDUCATION/TRAINING PROGRAM
Payer: COMMERCIAL

## 2024-11-08 VITALS — DIASTOLIC BLOOD PRESSURE: 88 MMHG | WEIGHT: 191.2 LBS | SYSTOLIC BLOOD PRESSURE: 124 MMHG | BODY MASS INDEX: 36.13 KG/M2

## 2024-11-08 DIAGNOSIS — O09.93 SUPERVISION OF HIGH RISK PREGNANCY IN THIRD TRIMESTER: ICD-10-CM

## 2024-11-08 DIAGNOSIS — O09.93 SUPERVISION OF HIGH RISK PREGNANCY IN THIRD TRIMESTER: Primary | ICD-10-CM

## 2024-11-08 LAB
ALBUMIN MFR UR ELPH: 18.4 MG/DL
ALT SERPL W P-5'-P-CCNC: 39 U/L (ref 0–50)
ANION GAP SERPL CALCULATED.3IONS-SCNC: 11 MMOL/L (ref 7–15)
AST SERPL W P-5'-P-CCNC: 23 U/L (ref 0–45)
BUN SERPL-MCNC: 8.1 MG/DL (ref 6–20)
CALCIUM SERPL-MCNC: 9.4 MG/DL (ref 8.8–10.4)
CHLORIDE SERPL-SCNC: 107 MMOL/L (ref 98–107)
CREAT SERPL-MCNC: 0.74 MG/DL (ref 0.51–0.95)
CREAT UR-MCNC: 198.3 MG/DL
EGFRCR SERPLBLD CKD-EPI 2021: >90 ML/MIN/1.73M2
GLUCOSE SERPL-MCNC: 87 MG/DL (ref 70–99)
HCO3 SERPL-SCNC: 20 MMOL/L (ref 22–29)
POTASSIUM SERPL-SCNC: 4.4 MMOL/L (ref 3.4–5.3)
PROT/CREAT 24H UR: 0.09 MG/MG CR (ref 0–0.2)
SODIUM SERPL-SCNC: 138 MMOL/L (ref 135–145)
URATE SERPL-MCNC: 5.2 MG/DL (ref 2.4–5.7)

## 2024-11-08 PROCEDURE — 84156 ASSAY OF PROTEIN URINE: CPT

## 2024-11-08 PROCEDURE — 36415 COLL VENOUS BLD VENIPUNCTURE: CPT

## 2024-11-08 PROCEDURE — 84460 ALANINE AMINO (ALT) (SGPT): CPT

## 2024-11-08 PROCEDURE — 84550 ASSAY OF BLOOD/URIC ACID: CPT

## 2024-11-08 PROCEDURE — 80048 BASIC METABOLIC PNL TOTAL CA: CPT

## 2024-11-08 PROCEDURE — 82947 ASSAY GLUCOSE BLOOD QUANT: CPT

## 2024-11-08 PROCEDURE — 84450 TRANSFERASE (AST) (SGOT): CPT

## 2024-11-08 NOTE — PROGRESS NOTES
Ravi reports that her blood pressure at home was elevated in the 140's/80's, so she came to clinic for a recheck.    Denies any headache, visual disturbances, RUQ pain, increased edema or SOB.    Blood pressures today in clinic average 121/87.    Discussed with Dr Estevez, and decision made to collect baseline pre-e labs

## 2024-11-11 ENCOUNTER — HOSPITAL ENCOUNTER (OUTPATIENT)
Dept: ULTRASOUND IMAGING | Facility: CLINIC | Age: 44
Discharge: HOME | End: 2024-11-11
Attending: OBSTETRICS & GYNECOLOGY
Payer: COMMERCIAL

## 2024-11-11 ENCOUNTER — TELEPHONE (OUTPATIENT)
Dept: OBGYN | Facility: CLINIC | Age: 44
End: 2024-11-11

## 2024-11-11 ENCOUNTER — OFFICE VISIT (OUTPATIENT)
Dept: MATERNAL FETAL MEDICINE | Facility: CLINIC | Age: 44
End: 2024-11-11
Attending: OBSTETRICS & GYNECOLOGY
Payer: COMMERCIAL

## 2024-11-11 VITALS — SYSTOLIC BLOOD PRESSURE: 128 MMHG | DIASTOLIC BLOOD PRESSURE: 91 MMHG

## 2024-11-11 DIAGNOSIS — O09.93 SUPERVISION OF HIGH RISK PREGNANCY IN THIRD TRIMESTER: Primary | ICD-10-CM

## 2024-11-11 DIAGNOSIS — E03.9 HYPOTHYROIDISM AFFECTING PREGNANCY IN THIRD TRIMESTER: ICD-10-CM

## 2024-11-11 DIAGNOSIS — O99.283 HYPOTHYROIDISM AFFECTING PREGNANCY IN THIRD TRIMESTER: ICD-10-CM

## 2024-11-11 DIAGNOSIS — O24.414 INSULIN CONTROLLED GESTATIONAL DIABETES MELLITUS (GDM) IN THIRD TRIMESTER: ICD-10-CM

## 2024-11-11 DIAGNOSIS — R03.0 ELEVATED BLOOD PRESSURE READING WITHOUT DIAGNOSIS OF HYPERTENSION: ICD-10-CM

## 2024-11-11 DIAGNOSIS — O13.3 GESTATIONAL HYPERTENSION, THIRD TRIMESTER: ICD-10-CM

## 2024-11-11 DIAGNOSIS — O36.5990 FETAL GROWTH RESTRICTION ANTEPARTUM: Primary | ICD-10-CM

## 2024-11-11 DIAGNOSIS — O99.210 OBESITY DURING PREGNANCY: ICD-10-CM

## 2024-11-11 PROCEDURE — 76819 FETAL BIOPHYS PROFIL W/O NST: CPT

## 2024-11-11 NOTE — TELEPHONE ENCOUNTER
Talked to patient about scheduling weekly BP checks with RN team. Patient requested that MFM check her bp when she visits them. Writer advised patient to ask them at her next appointment, but this would be okay.

## 2024-11-11 NOTE — TELEPHONE ENCOUNTER
DANIA RN called; pt was seen today with elevated BPs, no symptoms. Dr. Alexander recommends weekly pre-E labs, wants us to order them as standing orders.    Pended standing pre-E orders to on-call MD for approval.

## 2024-11-11 NOTE — TELEPHONE ENCOUNTER
Reviewed chart. Pt with mild range BPs at [facility] visit today. No prior documented elevated BPs in this pregnancy, though has had some. Has multiple risk factors for preE, so while not yet meeting criteria, plan to start weekly preE labs and BP checks.    Susan Saleh MD,  11/11/24 1:05 PM

## 2024-11-11 NOTE — PROGRESS NOTES
Please refer to ultrasound report under 'Imaging' Studies of 'Chart Review' tabs.    Rodney Alexander M.D.

## 2024-11-11 NOTE — NURSING NOTE
Patient reports positive fetal movement, denies contractions, leaking of fluid, or bleeding.  Reports blood sugar values fasting and hr post prandial wnl on insulin.  Patient requesting blood pressure check because they have been elevated (still in normal range) last couple checks. Bloop pressures today 135/95 and 128/91 sitting in chair. Patient denies signs of preeclampsia today. Education provided to patient on today's ultrasound/BPP.  SBAR given to DANIA IVY, see their note in Epic.  Call placed to [facility] and spoke to Sofia FRANKLIN - informed of elevated BP's in clinic today. Request for orders for weekly preeclampsia labs based on Dr. Alexander's recommendation. Sofia will request from one of the OB providers.

## 2024-11-14 ENCOUNTER — HOSPITAL ENCOUNTER (OUTPATIENT)
Dept: ULTRASOUND IMAGING | Facility: CLINIC | Age: 44
Discharge: HOME | End: 2024-11-14
Attending: STUDENT IN AN ORGANIZED HEALTH CARE EDUCATION/TRAINING PROGRAM
Payer: COMMERCIAL

## 2024-11-14 ENCOUNTER — VIRTUAL VISIT (OUTPATIENT)
Dept: EDUCATION SERVICES | Facility: CLINIC | Age: 44
End: 2024-11-14
Payer: COMMERCIAL

## 2024-11-14 ENCOUNTER — OFFICE VISIT (OUTPATIENT)
Dept: MATERNAL FETAL MEDICINE | Facility: CLINIC | Age: 44
End: 2024-11-14
Attending: STUDENT IN AN ORGANIZED HEALTH CARE EDUCATION/TRAINING PROGRAM
Payer: COMMERCIAL

## 2024-11-14 ENCOUNTER — LAB (OUTPATIENT)
Dept: LAB | Facility: CLINIC | Age: 44
End: 2024-11-14
Attending: STUDENT IN AN ORGANIZED HEALTH CARE EDUCATION/TRAINING PROGRAM
Payer: COMMERCIAL

## 2024-11-14 VITALS — SYSTOLIC BLOOD PRESSURE: 138 MMHG | DIASTOLIC BLOOD PRESSURE: 89 MMHG

## 2024-11-14 DIAGNOSIS — O09.93 SUPERVISION OF HIGH RISK PREGNANCY IN THIRD TRIMESTER: ICD-10-CM

## 2024-11-14 DIAGNOSIS — O24.414 INSULIN CONTROLLED GESTATIONAL DIABETES MELLITUS (GDM) IN THIRD TRIMESTER: Primary | ICD-10-CM

## 2024-11-14 DIAGNOSIS — O24.414 INSULIN CONTROLLED GESTATIONAL DIABETES MELLITUS (GDM) IN THIRD TRIMESTER: ICD-10-CM

## 2024-11-14 DIAGNOSIS — O36.5990 FETAL GROWTH RESTRICTION ANTEPARTUM: ICD-10-CM

## 2024-11-14 DIAGNOSIS — R03.0 ELEVATED BLOOD PRESSURE READING WITHOUT DIAGNOSIS OF HYPERTENSION: ICD-10-CM

## 2024-11-14 LAB
ALBUMIN MFR UR ELPH: 15.4 MG/DL
ALT SERPL W P-5'-P-CCNC: 27 U/L (ref 0–50)
ANION GAP SERPL CALCULATED.3IONS-SCNC: 14 MMOL/L (ref 7–15)
AST SERPL W P-5'-P-CCNC: 19 U/L (ref 0–45)
BUN SERPL-MCNC: 11 MG/DL (ref 6–20)
CALCIUM SERPL-MCNC: 9.2 MG/DL (ref 8.8–10.4)
CHLORIDE SERPL-SCNC: 107 MMOL/L (ref 98–107)
CREAT SERPL-MCNC: 0.85 MG/DL (ref 0.51–0.95)
CREAT UR-MCNC: 144 MG/DL
EGFRCR SERPLBLD CKD-EPI 2021: 87 ML/MIN/1.73M2
ERYTHROCYTE [DISTWIDTH] IN BLOOD BY AUTOMATED COUNT: 13.9 % (ref 10–15)
GLUCOSE SERPL-MCNC: 95 MG/DL (ref 70–99)
HCO3 SERPL-SCNC: 19 MMOL/L (ref 22–29)
HCT VFR BLD AUTO: 34.7 % (ref 35–47)
HGB BLD-MCNC: 11.9 G/DL (ref 11.7–15.7)
MCH RBC QN AUTO: 29.9 PG (ref 26.5–33)
MCHC RBC AUTO-ENTMCNC: 34.3 G/DL (ref 31.5–36.5)
MCV RBC AUTO: 87 FL (ref 78–100)
PLATELET # BLD AUTO: 248 10E3/UL (ref 150–450)
POTASSIUM SERPL-SCNC: 4.2 MMOL/L (ref 3.4–5.3)
PROT/CREAT 24H UR: 0.11 MG/MG CR (ref 0–0.2)
RBC # BLD AUTO: 3.98 10E6/UL (ref 3.8–5.2)
SODIUM SERPL-SCNC: 140 MMOL/L (ref 135–145)
URATE SERPL-MCNC: 5.7 MG/DL (ref 2.4–5.7)
WBC # BLD AUTO: 10.6 10E3/UL (ref 4–11)

## 2024-11-14 PROCEDURE — 76820 UMBILICAL ARTERY ECHO: CPT

## 2024-11-14 PROCEDURE — 85014 HEMATOCRIT: CPT

## 2024-11-14 PROCEDURE — 98967 PH1 ASSMT&MGMT NQHP 11-20: CPT | Mod: 95 | Performed by: DIETITIAN, REGISTERED

## 2024-11-14 PROCEDURE — 36415 COLL VENOUS BLD VENIPUNCTURE: CPT

## 2024-11-14 PROCEDURE — 82565 ASSAY OF CREATININE: CPT

## 2024-11-14 PROCEDURE — 80048 BASIC METABOLIC PNL TOTAL CA: CPT

## 2024-11-14 PROCEDURE — 84460 ALANINE AMINO (ALT) (SGPT): CPT

## 2024-11-14 PROCEDURE — 84156 ASSAY OF PROTEIN URINE: CPT

## 2024-11-14 PROCEDURE — 84550 ASSAY OF BLOOD/URIC ACID: CPT

## 2024-11-14 PROCEDURE — 84450 TRANSFERASE (AST) (SGOT): CPT

## 2024-11-14 PROCEDURE — 59025 FETAL NON-STRESS TEST: CPT

## 2024-11-14 NOTE — PROGRESS NOTES
Diabetes Self-Management Education & Support  Type of service:  Video Visit    If the video visit is dropped, the video visit invitation should be resent by: Text to cell phone: [phone]    Originating Location (pt. Location): Home  Distant Location (provider location): [facility]  Mode of Communication:  Video Conference via [employer]    Video Start Time:  12:57 PM  Video End Time (time video stopped): 1:09 PM    How would patient like to obtain AVS? MyChart    SUBJECTIVE/OBJECTIVE:  Presents for education related to gestational diabetes.    Diabetes management related comments/concerns: (Patient-Rptd) All my levels are  within normal limits except one value when I had high carb meal.  Gestational weeks: 32w0d  Hospital planned for delivery: (Patient-Rptd) [facility]  Next OB Visit Date: 24  Number of previous pregnancies: (Patient-Rptd) 0  Had any babies over 9 lbs: (Patient-Rptd) No  Previously had Gestational Diabetes: (Patient-Rptd) No  Have you ever had thyroid problems or taken thyroid medication?: (!) (Patient-Rptd) Yes  Heart disease, mitral valve prolapse or rheumatic fever?: (Patient-Rptd) No  Hypertension : (!) (Patient-Rptd) Yes  High Cholesterol: (Patient-Rptd) No  High Triglycerides: (Patient-Rptd) No  Do you use tobacco products?: (Patient-Rptd) No  Do you drink beer, wine or hard liquor?: (Patient-Rptd) No    Cultural Influences/Ethnic Background:  Not  or     LMP 2024 (Exact Date)     Weight gain not assessed    Estimated Date of Delivery: 2025    Blood Glucose/Ketone Lo elevated reading of 148 mg/dL after breakfast - large portion per patient    Lifestyle and Health Behaviors:  Pre-pregnancy weight (lbs): 191  Exercise:: Yes  Barrier to exercise: (Patient-Rptd) Access  Cultural/Lutheran diet restrictions?: (Patient-Rptd) No  Meal planning/habits: (Patient-Rptd) Avoiding sweets, Calorie counting, Carb counting,  Low carb, Plate planning method  How many times a week on average do you eat food made away from home (restaurant/take-out)?: (Patient-Rptd) 2  Meals include: (Patient-Rptd) Breakfast, Lunch, Dinner, Afternoon Snack  Breakfast: eggs and egg whites at meals  Lunch: chicken with flat breads- homemade with chickpea and whole wheat flour OR whole wheat tortillas. Salads and bell peppers, spring onions  Dinner: chicken with flat breads- homemade with chickpea and whole wheat flour OR whole wheat tortillas. Salads and bell peppers, spring onions  Snacks: banana or apple  Beverages: (Patient-Rptd) Water, Tea  How many servings of fruits/vegetables per day: (Patient-Rptd) 2  Biggest challenges to healthy eating: (Patient-Rptd) Portion control, Lack of time  Pre-linnea vitamin?: (Patient-Rptd) Yes  Supplements?: (Patient-Rptd) No  Experiencing nausea?: (Patient-Rptd) Yes  Experiencing heartburn?: (Patient-Rptd) No    Healthy Coping:  Emotional response to diabetes: Ready to learn  Informal Support system:: (Patient-Rptd) Partner  Stage of change: ACTION (Actively working towards change)    Current Management:  Taking medications for gestational diabetes?: No  Difficulty affording diabetes medication?: No  Difficulty affording diabetes testing supplies?: No    ASSESSMENT:  Ketones: has checked once - negative.   Fasting blood glucoses: 100% in target.  After breakfast: 100% in target.  After lunch: 100% in target.  After dinner: 86% in target.    Met with Ravi, she was started on 8 units of NPH at bedtime by Endocrinology. Her fasting glucose looks within target and she only had one elevation post meal. We reviewed post partum follow up and plans for after delivery. She is active and still working on checking her glucose more frequently - she has been in the middle of transition with  moving.     INTERVENTION:  Educational topics covered today:  What to expect after delivery, Future testing for Type 2 diabetes (2 hour OGTT  at 6 week post-partum check-up and annual fasting blood glucose level), Risk of GDM and planning ahead for future pregnancies, Recommended lifestyle interventions for reducing the risk of Type 2 Diabetes, When to Call a Diabetes Educator or OB Provider    Educational Materials provided today:  [facility] Preventing Diabetes    PLAN:  Check glucose 4 times daily.  Check ketones once a week when readings are consistently negative.  Continue with recommended physical activity.  Continue to follow recommended meal plan: 2 carbs at breakfast, 4 carbs at lunch, 4 carbs at supper, 1-2 carbs at snacks.  Follow consistent CHO meal plan, eat CHO and protein/fat at all meals/snacks.    Call/e-mail/MyChart message diabetes educator if 3 or more blood sugars are above the goal in 1 week or if ketones are positive.      Time Spent: 12 minutes  Encounter Type: Individual    Any diabetes medication dose changes were made via the CDE Protocol and Collaborative Practice Agreement with the patient's referring provider. A copy of this encounter was shared with the provider.

## 2024-11-14 NOTE — PATIENT INSTRUCTIONS
Goals for Gestational Diabetes Care:    1. Eat balanced meals (3 meals + 3 snacks daily)    Breakfast: 30 grams carbohydrate + Protein  Snack: 15-30 grams carbohydrate + protein  Lunch: 45-60 grams carbohydrate + protein/vegetables  Snack: 15-30 grams Carbohydrate + protein  Dinner: 45-60 grams carbohydrate + protein/vegetables  Bedtime Snack: 15-30 grams + protein    ----Make sure you include protein source with each meal and at bedtime - this has been shown to help with blood glucose elevations    2. Each Morning Check Ketones (small/mod/high - call Diabetes Care Line)  Your goal is negative or trace ketones. If you have ketones in your urine it means you are not eating enough before you go to bed. Eat a larger bedtime snack and include protein.     3. Aim to get at least 30 minutes of activity each day. Activity really helps improve blood sugars.     4. Blood Glucose Targets:   1. Fasting Less than 95 mg/dL   2. 1 hours after a meal target is less than 140 mg/dL  ----Always remember to bring meter and log book to all appointments.    5. Take 8 units of NPH at bedtime     Follow up with your Diabetic Educator to assess BG targets in 1 week.  If Blood glucose levels are above normal 3 times or more in one week and you cannot explain them or if you develop small, moderate or high ketones call Diabetes Care at [phone]    Call with any questions.    Thank you,  Miranda Sher RDN, LD, [facility]   Certified Diabetes Care &   [phone]

## 2024-11-14 NOTE — NURSING NOTE
Patient reports positive fetal movement. Patient denies headache, visual changes, nausea/vomiting, epigastric pain related to preeclampsia. BP today 138/89. Patient has weekly pre E labs that she is going to get drawn after today's ultrasound. Education provided to patient on today's ultrasound.  SBAR given to DANIA IVY, see their note in Epic.    NST Performed due to FGR.  Dr. Dupont reviewed efm tracing. See NST/BPP Doc Flowsheet tab.

## 2024-11-14 NOTE — LETTER
2024         RE: Ravi Peñaloza  [address]  [place] [facility] [address]        Dear Colleague,    Thank you for referring your patient, Ravi Peñaloza, to the [facility]. Please see a copy of my visit note below.    Diabetes Self-Management Education & Support  Type of service:  Video Visit    If the video visit is dropped, the video visit invitation should be resent by: Text to cell phone: [phone]    Originating Location (pt. Location): Home  Distant Location (provider location): [facility]  Mode of Communication:  Video Conference via [employer]xt    Video Start Time:  12:57 PM  Video End Time (time video stopped): 1:09 PM    How would patient like to obtain AVS? MyChart    SUBJECTIVE/OBJECTIVE:  Presents for education related to gestational diabetes.    Diabetes management related comments/concerns: (Patient-Rptd) All my levels are  within normal limits except one value when I had high carb meal.  Gestational weeks: 32w0d  Hospital planned for delivery: (Patient-Rptd) [facility]  Next OB Visit Date: 24  Number of previous pregnancies: (Patient-Rptd) 0  Had any babies over 9 lbs: (Patient-Rptd) No  Previously had Gestational Diabetes: (Patient-Rptd) No  Have you ever had thyroid problems or taken thyroid medication?: (!) (Patient-Rptd) Yes  Heart disease, mitral valve prolapse or rheumatic fever?: (Patient-Rptd) No  Hypertension : (!) (Patient-Rptd) Yes  High Cholesterol: (Patient-Rptd) No  High Triglycerides: (Patient-Rptd) No  Do you use tobacco products?: (Patient-Rptd) No  Do you drink beer, wine or hard liquor?: (Patient-Rptd) No    Cultural Influences/Ethnic Background:  Not  or     LMP 2024 (Exact Date)     Weight gain not assessed    Estimated Date of Delivery: 2025    Blood Glucose/Ketone Lo elevated reading of 148 mg/dL after breakfast - large portion per patient    Lifestyle and Health  Behaviors:  Pre-pregnancy weight (lbs): 191  Exercise:: Yes  Barrier to exercise: (Patient-Rptd) Access  Cultural/Latter-day diet restrictions?: (Patient-Rptd) No  Meal planning/habits: (Patient-Rptd) Avoiding sweets, Calorie counting, Carb counting, Low carb, Plate planning method  How many times a week on average do you eat food made away from home (restaurant/take-out)?: (Patient-Rptd) 2  Meals include: (Patient-Rptd) Breakfast, Lunch, Dinner, Afternoon Snack  Breakfast: eggs and egg whites at meals  Lunch: chicken with flat breads- homemade with chickpea and whole wheat flour OR whole wheat tortillas. Salads and bell peppers, spring onions  Dinner: chicken with flat breads- homemade with chickpea and whole wheat flour OR whole wheat tortillas. Salads and bell peppers, spring onions  Snacks: banana or apple  Beverages: (Patient-Rptd) Water, Tea  How many servings of fruits/vegetables per day: (Patient-Rptd) 2  Biggest challenges to healthy eating: (Patient-Rptd) Portion control, Lack of time  Pre- vitamin?: (Patient-Rptd) Yes  Supplements?: (Patient-Rptd) No  Experiencing nausea?: (Patient-Rptd) Yes  Experiencing heartburn?: (Patient-Rptd) No    Healthy Coping:  Emotional response to diabetes: Ready to learn  Informal Support system:: (Patient-Rptd) Partner  Stage of change: ACTION (Actively working towards change)    Current Management:  Taking medications for gestational diabetes?: No  Difficulty affording diabetes medication?: No  Difficulty affording diabetes testing supplies?: No    ASSESSMENT:  Ketones: has checked once - negative.   Fasting blood glucoses: 100% in target.  After breakfast: 100% in target.  After lunch: 100% in target.  After dinner: 86% in target.    Met with Ravi, she was started on 8 units of NPH at bedtime by Endocrinology. Her fasting glucose looks within target and she only had one elevation post meal. We reviewed post partum follow up and plans for after delivery. She is  active and still working on checking her glucose more frequently - she has been in the middle of transition with  moving.     INTERVENTION:  Educational topics covered today:  What to expect after delivery, Future testing for Type 2 diabetes (2 hour OGTT at 6 week post-partum check-up and annual fasting blood glucose level), Risk of GDM and planning ahead for future pregnancies, Recommended lifestyle interventions for reducing the risk of Type 2 Diabetes, When to Call a Diabetes Educator or OB Provider    Educational Materials provided today:  [facility] Preventing Diabetes    PLAN:  Check glucose 4 times daily.  Check ketones once a week when readings are consistently negative.  Continue with recommended physical activity.  Continue to follow recommended meal plan: 2 carbs at breakfast, 4 carbs at lunch, 4 carbs at supper, 1-2 carbs at snacks.  Follow consistent CHO meal plan, eat CHO and protein/fat at all meals/snacks.    Call/e-mail/MyChart message diabetes educator if 3 or more blood sugars are above the goal in 1 week or if ketones are positive.      Time Spent: 12 minutes  Encounter Type: Individual    Any diabetes medication dose changes were made via the CDE Protocol and Collaborative Practice Agreement with the patient's referring provider. A copy of this encounter was shared with the provider.

## 2024-11-14 NOTE — PROGRESS NOTES
Please see full imaging report from [employer] program under imaging tab.    Melissa Dupont MD  Maternal Fetal Medicine

## 2024-11-18 ENCOUNTER — OFFICE VISIT (OUTPATIENT)
Dept: MATERNAL FETAL MEDICINE | Facility: CLINIC | Age: 44
End: 2024-11-18
Attending: OBSTETRICS & GYNECOLOGY
Payer: COMMERCIAL

## 2024-11-18 ENCOUNTER — HOSPITAL ENCOUNTER (OUTPATIENT)
Dept: ULTRASOUND IMAGING | Facility: CLINIC | Age: 44
Discharge: HOME | End: 2024-11-18
Attending: OBSTETRICS & GYNECOLOGY
Payer: COMMERCIAL

## 2024-11-18 ENCOUNTER — HOSPITAL ENCOUNTER (INPATIENT)
Facility: CLINIC | Age: 44
LOS: 3 days | Discharge: HOME | End: 2024-11-21
Attending: OBSTETRICS & GYNECOLOGY | Admitting: STUDENT IN AN ORGANIZED HEALTH CARE EDUCATION/TRAINING PROGRAM
Payer: COMMERCIAL

## 2024-11-18 ENCOUNTER — MYC REFILL (OUTPATIENT)
Dept: ENDOCRINOLOGY | Facility: CLINIC | Age: 44
End: 2024-11-18
Payer: COMMERCIAL

## 2024-11-18 VITALS — SYSTOLIC BLOOD PRESSURE: 152 MMHG | DIASTOLIC BLOOD PRESSURE: 103 MMHG

## 2024-11-18 DIAGNOSIS — O36.5990 FETAL GROWTH RESTRICTION ANTEPARTUM: Primary | ICD-10-CM

## 2024-11-18 DIAGNOSIS — O24.414 INSULIN CONTROLLED GESTATIONAL DIABETES MELLITUS (GDM) IN THIRD TRIMESTER: Primary | ICD-10-CM

## 2024-11-18 DIAGNOSIS — O24.414 INSULIN CONTROLLED GESTATIONAL DIABETES MELLITUS (GDM) IN THIRD TRIMESTER: ICD-10-CM

## 2024-11-18 DIAGNOSIS — E06.3 HYPOTHYROIDISM DUE TO HASHIMOTO THYROIDITIS: ICD-10-CM

## 2024-11-18 PROBLEM — Z36.89 ENCOUNTER FOR TRIAGE IN PREGNANT PATIENT: Status: ACTIVE | Noted: 2024-11-18

## 2024-11-18 PROBLEM — O13.9 GESTATIONAL HYPERTENSION: Status: ACTIVE | Noted: 2024-11-18

## 2024-11-18 LAB
ABO/RH(D): NORMAL
ALBUMIN MFR UR ELPH: 8.1 MG/DL
ALBUMIN SERPL BCG-MCNC: 3 G/DL (ref 3.5–5.2)
ALP SERPL-CCNC: 111 U/L (ref 40–150)
ALT SERPL W P-5'-P-CCNC: 19 U/L (ref 0–50)
ANION GAP SERPL CALCULATED.3IONS-SCNC: 12 MMOL/L (ref 7–15)
ANTIBODY SCREEN: NEGATIVE
AST SERPL W P-5'-P-CCNC: 15 U/L (ref 0–45)
BILIRUB SERPL-MCNC: 0.2 MG/DL
BUN SERPL-MCNC: 9.2 MG/DL (ref 6–20)
CALCIUM SERPL-MCNC: 9 MG/DL (ref 8.8–10.4)
CHLORIDE SERPL-SCNC: 106 MMOL/L (ref 98–107)
CREAT SERPL-MCNC: 0.68 MG/DL (ref 0.51–0.95)
CREAT UR-MCNC: 48.8 MG/DL
EGFRCR SERPLBLD CKD-EPI 2021: >90 ML/MIN/1.73M2
ERYTHROCYTE [DISTWIDTH] IN BLOOD BY AUTOMATED COUNT: 13.9 % (ref 10–15)
GLUCOSE BLDC GLUCOMTR-MCNC: 126 MG/DL (ref 70–99)
GLUCOSE BLDC GLUCOMTR-MCNC: 149 MG/DL (ref 70–99)
GLUCOSE SERPL-MCNC: 80 MG/DL (ref 70–99)
HCO3 SERPL-SCNC: 18 MMOL/L (ref 22–29)
HCT VFR BLD AUTO: 31.7 % (ref 35–47)
HGB BLD-MCNC: 11 G/DL (ref 11.7–15.7)
MCH RBC QN AUTO: 29.7 PG (ref 26.5–33)
MCHC RBC AUTO-ENTMCNC: 34.7 G/DL (ref 31.5–36.5)
MCV RBC AUTO: 86 FL (ref 78–100)
PLATELET # BLD AUTO: 216 10E3/UL (ref 150–450)
POTASSIUM SERPL-SCNC: 3.8 MMOL/L (ref 3.4–5.3)
PROT SERPL-MCNC: 6.2 G/DL (ref 6.4–8.3)
PROT/CREAT 24H UR: 0.17 MG/MG CR (ref 0–0.2)
RBC # BLD AUTO: 3.7 10E6/UL (ref 3.8–5.2)
SODIUM SERPL-SCNC: 136 MMOL/L (ref 135–145)
SPECIMEN EXPIRATION DATE: NORMAL
WBC # BLD AUTO: 8.6 10E3/UL (ref 4–11)

## 2024-11-18 PROCEDURE — 36415 COLL VENOUS BLD VENIPUNCTURE: CPT | Performed by: OBSTETRICS & GYNECOLOGY

## 2024-11-18 PROCEDURE — 80053 COMPREHEN METABOLIC PANEL: CPT | Performed by: OBSTETRICS & GYNECOLOGY

## 2024-11-18 PROCEDURE — 76819 FETAL BIOPHYS PROFIL W/O NST: CPT

## 2024-11-18 PROCEDURE — G0463 HOSPITAL OUTPT CLINIC VISIT: HCPCS

## 2024-11-18 PROCEDURE — 86900 BLOOD TYPING SEROLOGIC ABO: CPT | Performed by: OBSTETRICS & GYNECOLOGY

## 2024-11-18 PROCEDURE — 82247 BILIRUBIN TOTAL: CPT | Performed by: OBSTETRICS & GYNECOLOGY

## 2024-11-18 PROCEDURE — 82310 ASSAY OF CALCIUM: CPT | Performed by: OBSTETRICS & GYNECOLOGY

## 2024-11-18 PROCEDURE — 120N000002 HC R&B MED SURG/OB UMMC

## 2024-11-18 PROCEDURE — 250N000011 HC RX IP 250 OP 636

## 2024-11-18 PROCEDURE — 82040 ASSAY OF SERUM ALBUMIN: CPT | Performed by: OBSTETRICS & GYNECOLOGY

## 2024-11-18 PROCEDURE — 250N000012 HC RX MED GY IP 250 OP 636 PS 637

## 2024-11-18 PROCEDURE — 84156 ASSAY OF PROTEIN URINE: CPT | Performed by: OBSTETRICS & GYNECOLOGY

## 2024-11-18 PROCEDURE — 85027 COMPLETE CBC AUTOMATED: CPT | Performed by: OBSTETRICS & GYNECOLOGY

## 2024-11-18 RX ORDER — LEVOTHYROXINE SODIUM 112 UG/1
112 TABLET ORAL DAILY
Qty: 60 TABLET | Refills: 0 | Status: SHIPPED | OUTPATIENT
Start: 2024-11-18

## 2024-11-18 RX ORDER — METOCLOPRAMIDE 10 MG/1
10 TABLET ORAL EVERY 6 HOURS PRN
Status: DISCONTINUED | OUTPATIENT
Start: 2024-11-18 | End: 2024-11-21 | Stop reason: HOSPADM

## 2024-11-18 RX ORDER — LEVOTHYROXINE SODIUM 112 UG/1
112 TABLET ORAL DAILY
Status: DISCONTINUED | OUTPATIENT
Start: 2024-11-19 | End: 2024-11-21 | Stop reason: HOSPADM

## 2024-11-18 RX ORDER — ONDANSETRON 4 MG/1
4 TABLET, ORALLY DISINTEGRATING ORAL EVERY 6 HOURS PRN
Status: DISCONTINUED | OUTPATIENT
Start: 2024-11-18 | End: 2024-11-21 | Stop reason: HOSPADM

## 2024-11-18 RX ORDER — HYDROXYZINE HYDROCHLORIDE 50 MG/1
50 TABLET, FILM COATED ORAL
Status: DISCONTINUED | OUTPATIENT
Start: 2024-11-18 | End: 2024-11-21 | Stop reason: HOSPADM

## 2024-11-18 RX ORDER — LIDOCAINE 40 MG/G
CREAM TOPICAL
Status: DISCONTINUED | OUTPATIENT
Start: 2024-11-18 | End: 2024-11-18

## 2024-11-18 RX ORDER — ACETAMINOPHEN 325 MG/1
650 TABLET ORAL EVERY 4 HOURS PRN
Status: DISCONTINUED | OUTPATIENT
Start: 2024-11-18 | End: 2024-11-21 | Stop reason: HOSPADM

## 2024-11-18 RX ORDER — NICOTINE POLACRILEX 4 MG
15-30 LOZENGE BUCCAL
Status: DISCONTINUED | OUTPATIENT
Start: 2024-11-18 | End: 2024-11-21 | Stop reason: HOSPADM

## 2024-11-18 RX ORDER — BETAMETHASONE SODIUM PHOSPHATE AND BETAMETHASONE ACETATE 3; 3 MG/ML; MG/ML
12 INJECTION, SUSPENSION INTRA-ARTICULAR; INTRALESIONAL; INTRAMUSCULAR; SOFT TISSUE EVERY 24 HOURS
Status: COMPLETED | OUTPATIENT
Start: 2024-11-18 | End: 2024-11-19

## 2024-11-18 RX ORDER — SODIUM PHOSPHATE,MONO-DIBASIC 19G-7G/118
1 ENEMA (ML) RECTAL DAILY PRN
Status: DISCONTINUED | OUTPATIENT
Start: 2024-11-18 | End: 2024-11-21 | Stop reason: HOSPADM

## 2024-11-18 RX ORDER — HYDROXYZINE HYDROCHLORIDE 50 MG/1
50 TABLET, FILM COATED ORAL EVERY 6 HOURS PRN
Status: DISCONTINUED | OUTPATIENT
Start: 2024-11-18 | End: 2024-11-18

## 2024-11-18 RX ORDER — CALCIUM CARBONATE 500 MG/1
1000 TABLET, CHEWABLE ORAL
Status: DISCONTINUED | OUTPATIENT
Start: 2024-11-18 | End: 2024-11-18

## 2024-11-18 RX ORDER — ONDANSETRON 2 MG/ML
4 INJECTION INTRAMUSCULAR; INTRAVENOUS EVERY 6 HOURS PRN
Status: DISCONTINUED | OUTPATIENT
Start: 2024-11-18 | End: 2024-11-21 | Stop reason: HOSPADM

## 2024-11-18 RX ORDER — DOCUSATE SODIUM 100 MG/1
100 CAPSULE, LIQUID FILLED ORAL 2 TIMES DAILY
Status: DISCONTINUED | OUTPATIENT
Start: 2024-11-18 | End: 2024-11-19

## 2024-11-18 RX ORDER — PRENATAL VIT/IRON FUM/FOLIC AC 27MG-0.8MG
1 TABLET ORAL DAILY
Status: DISCONTINUED | OUTPATIENT
Start: 2024-11-19 | End: 2024-11-21 | Stop reason: HOSPADM

## 2024-11-18 RX ORDER — MAGNESIUM HYDROXIDE/ALUMINUM HYDROXICE/SIMETHICONE 120; 1200; 1200 MG/30ML; MG/30ML; MG/30ML
30 SUSPENSION ORAL 2 TIMES DAILY PRN
Status: DISCONTINUED | OUTPATIENT
Start: 2024-11-18 | End: 2024-11-21 | Stop reason: HOSPADM

## 2024-11-18 RX ORDER — PANTOPRAZOLE SODIUM 40 MG/1
40 TABLET, DELAYED RELEASE ORAL
Status: DISCONTINUED | OUTPATIENT
Start: 2024-11-19 | End: 2024-11-19

## 2024-11-18 RX ORDER — PROCHLORPERAZINE MALEATE 10 MG
10 TABLET ORAL EVERY 6 HOURS PRN
Status: DISCONTINUED | OUTPATIENT
Start: 2024-11-18 | End: 2024-11-21 | Stop reason: HOSPADM

## 2024-11-18 RX ORDER — DEXTROSE MONOHYDRATE 25 G/50ML
25-50 INJECTION, SOLUTION INTRAVENOUS
Status: DISCONTINUED | OUTPATIENT
Start: 2024-11-18 | End: 2024-11-21 | Stop reason: HOSPADM

## 2024-11-18 RX ORDER — BISACODYL 10 MG
10 SUPPOSITORY, RECTAL RECTAL DAILY PRN
Status: DISCONTINUED | OUTPATIENT
Start: 2024-11-18 | End: 2024-11-21 | Stop reason: HOSPADM

## 2024-11-18 RX ORDER — MAGNESIUM HYDROXIDE/ALUMINUM HYDROXICE/SIMETHICONE 120; 1200; 1200 MG/30ML; MG/30ML; MG/30ML
30 SUSPENSION ORAL
Status: DISCONTINUED | OUTPATIENT
Start: 2024-11-18 | End: 2024-11-18

## 2024-11-18 RX ORDER — ACETAMINOPHEN 325 MG/1
650 TABLET ORAL EVERY 4 HOURS PRN
Status: DISCONTINUED | OUTPATIENT
Start: 2024-11-18 | End: 2024-11-18

## 2024-11-18 RX ORDER — METOCLOPRAMIDE HYDROCHLORIDE 5 MG/ML
10 INJECTION INTRAMUSCULAR; INTRAVENOUS EVERY 6 HOURS PRN
Status: DISCONTINUED | OUTPATIENT
Start: 2024-11-18 | End: 2024-11-21 | Stop reason: HOSPADM

## 2024-11-18 RX ADMIN — INSULIN HUMAN 10 UNITS: 100 INJECTION, SUSPENSION SUBCUTANEOUS at 22:08

## 2024-11-18 RX ADMIN — BETAMETHASONE SODIUM PHOSPHATE AND BETAMETHASONE ACETATE 12 MG: 3; 3 INJECTION, SUSPENSION INTRA-ARTICULAR; INTRALESIONAL; INTRAMUSCULAR at 20:00

## 2024-11-18 ASSESSMENT — ACTIVITIES OF DAILY LIVING (ADL)
ADLS_ACUITY_SCORE: 0

## 2024-11-18 NOTE — PROGRESS NOTES
"Please see \"Imaging\" tab under \"Chart Review\" for details of today's visit.    BP (!) 152/103 (BP Location: Right arm, Patient Position: Chair, Cuff Size: Adult Regular)   LMP 04/04/2024 (Exact Date)     Blood pressure elevated compared to prior visits X 2.   Given history of gHTN, will send to L*D for further evaluation.         Kala Cloud MD PhD  Maternal Fetal Medicine     "

## 2024-11-18 NOTE — NURSING NOTE
Pt at [facility] for ultrasound- see detailed report under imaging tab.  Pt reports positive fetal movement, denies bleeding, loss of fluid, contractions, headache, swelling, visual changes and any other concerns.  BP elevated x 2 checks- reviewed with Dr. Cloud who recommends [facility] at [facility].  Charge RN and Dr. Trevizo notified and pt escorted to [facility] by writer.

## 2024-11-18 NOTE — H&P
OB HISTORY AND PHYSICAL    Patient: Ravi Peñaloza   MRN#: [id number]  YOB: 1980     HPI: Ravi Peñaloza is a 44 year old  at 32w4d by 7w6d US, who presents today for elevated blood pressures in the setting of gestational hypertension. She was seen in [facility] clinic this afternoon for BPP at which time she was found to have /103 and was recommended to present to L&D for evaluation.     On presentation reports feeling overall well without new health concerns. She reports good fetal movement. Denies LOF, vaginal bleeding, or contractions. Likewise denies headache, vision changes, dyspnea, chest pain, palpitations, RUQ pain, or LE swelling/tenderness. Has mostly been getting blood pressure checks when she presents for her biweekly BPPs, notes that they had been within range until her visit today.     For her GDMA2 has been administering insulin NPH 8 uniits at bedtime, notes that fasting sugars over the last week have all been in the 80s. Has not been consistently check postprandial sugars. No episodes of symptomatic hypoglycemia.     Medically history additionally notable for hypothyroidism for which she takes daily levothyroxine 112mcg. Surgical history notable for laparoscopic cholecystectomy, no history of adverse reactions to anesthesia or postoperative complications.     Pregnancy notable for:   - gHTN  - FGR (EFW 5% AC 3%, UAD nml, BPP )  - GDMA2  - Hypothyroid  - 12cm left lateral fibroid  - AMA     Prenatal Lab Results:  Lab Results   Component Value Date    HCT 34.7 2024    HGB 11.9 2024       Patient Active Problem List    Diagnosis Date Noted    Encounter for triage in pregnant patient 2024     Priority: Medium    Supervision of high risk pregnancy in third trimester 2024     Priority: Medium    Insulin controlled gestational diabetes mellitus (GDM) in third trimester 2024     Priority: Medium    Acquired hypothyroidism 2024     Priority:  Medium    Fetal growth restriction antepartum 11/07/2024     Priority: Medium    Uterine fibroids affecting pregnancy in third trimester 11/07/2024     Priority: Medium    AMA (advanced maternal age) primigravida 35+, third trimester 11/07/2024     Priority: Medium       HISTORY  Past Medical History:   Diagnosis Date    Fibroid uterus     Gestational diabetes     Hypothyroidism     Migraine without aura and without status migrainosus, not intractable        Past Surgical History:   Procedure Laterality Date    CHOLECYSTECTOMY  2018    LIPOSUCTION (LOCATION)  2014       Family History   Problem Relation Age of Onset    Hypertension Mother     Osteoarthritis Mother     Hypothyroidism Mother     Hypertension Father     Diabetes Type 2  Father     No Known Problems Maternal Grandmother     No Known Problems Maternal Grandfather     Diabetes Type 2  Paternal Grandmother     Asthma Paternal Grandfather     No Known Problems Brother     No Known Problems Brother        Social History     Tobacco Use    Smoking status: Never    Smokeless tobacco: Never   Substance Use Topics    Alcohol use: Not Currently     Comment: occasional prior to pregancy    Drug use: Never       Medications Prior to Admission   Medication Sig Dispense Refill Last Dose/Taking    acetone urine (KETOSTIX) test strip Check ketones once daily in urine 100 strip 2 11/18/2024 Morning    aspirin (ASA) 81 MG chewable tablet Take 81 mg by mouth daily.   11/17/2024 Evening    insulin NPH (NOVOLIN N FLEXPEN) 100 UNIT/ML injection Inject 8 Units subcutaneously at bedtime. 15 mL 3 11/17/2024 Bedtime    insulin pen needle (31G X 5 MM) 31G X 5 MM miscellaneous Use 1 pen needles daily or as directed. 30 each 1 11/17/2024 Bedtime    levothyroxine (SYNTHROID/LEVOTHROID) 112 MCG tablet Take 1 tablet (112 mcg) by mouth daily. Plus additional 1/2 tab weekly 60 tablet 0 11/18/2024 Morning    levothyroxine (SYNTHROID/LEVOTHROID) 88 MCG tablet Take 1 tablet (88 mcg) by  mouth daily. 90 tablet 3 More than a month    Prenatal MV-Min-Fe Fum-FA-DHA (PRENATAL MULTIVITAMIN PLUS DHA PO) Take 1 tablet by mouth daily.   2024 Evening       No Known Allergies     REVIEW OF SYSTEMS:  A 10 point review of systems was completed and was negative other than as noted in the HPI.    PHYSICAL EXAM  Patient Vitals for the past 24 hrs:   BP   24 1732 (!) 143/82   24 1654 (!) 140/85   24 1641 (!) 141/85   24 1623 139/83     Gen: Well-appearing, resting comfortably in triage bed   CV: Regular rate and rhythm   Lungs: Breathing non-labored, regular respiratory rate   Abd: Gravid, non-tender, non-distended  Ext: Trace peripheral extremity edema    Cervix: Deferred   Membranes: Intact   Estimated Fetal Weight: 5#     FHT:  Monitoring External  FHT: Baseline 130 bpm; moderate variability; accels present; isolated non-continuous late deceleration at [id number]  TOCO 0-1 contractions in 10 minutes    Studies: T&S, CBC, RPR    Assessment & Plan: 44 year old  at 32w4d by 7w6d US, here for rule out preeclampsia after found to have elevated blood pressures in [facility] clinic today Pregnancy is notable for gHTN, GDMA2, FGR (EFW 5%), hypothyroidism, uterine fibroid, and AMA. Although HELLP labs/UPC normal, blood pressures largely mild range while undergoing workup. Given AMA and blood pressure trajectory (just met criteria for gHTN last week, now having more persistent elevated blood pressures) and AMA have high suspicion that patient will progress to preeclampsia and may require delivery within in the next 7 days. In light of this through shared decision making discussed admission for  steroids and serial blood pressure monitoring, patient and partner wish to proceed.     # Gestational hypertension   # R/o preeclampsia   - Serial BP monitoring, Q4hr BP checks    - BPs: Normotensive to low mild range on admission   - Antihypertensives: IV antihypertensives PRN for sustained  severe range blood pressures (SBP>160, DBP>110 sustained over 15 minutes)  - Labs: HELLP labs, UPC within normal limits 11/18. Plan AM repeat 11/19   - Daily weights, strict I&Os  - No subjective signs/symptoms preeclampsia   - Discussed delivery timing based on current clinical picture unknown, but no indication for delivery at this time  - NICU consult PRN if imminent delivery     # Gestational Diabetes A2    Currently well-controlled on NPH 8 units at bedtime. Blood glucoses over the last week have been in 80s for AM fasting. She does not follow with Endocrinology.   - Given planned BMZ course, steroid plan as below:    - HOLD PTA NPH 8 units at bedtime    - NPH 10 units 11/18 PM (25% increase)    - NPH 11 units 11/19, 11/20 PM (40% increase)    - NPH 10 units 11/21 PM    - NPH 8 units 11/22-forward   - Medium sliding scale insulin   - Blood Glucose Checks: Fasting, TID AC, PP, at bedtime   - Insulin gtt PRN + D5 NS PRN for labile sugars in labor PRN    # Hypothyroidism   - PTA levothyroxine 112mcg PO qdaily   - TSH 0.823 10/20/24     # PNC  - Rh positive, Rubella immune  - GBS unknown - swab ordered   - Other prenatal labs wnl  - Imaging: placenta   - S/p TDaP 11/5; flu, COVID declined      # FWB:   # Fetal growth restriction   - NST reactive on admission   - TID NSTs   - BMZ for fetal lung maturity, dose #1/2   - NICU for delivery PRN   - Intrauterine resuscitative measures prn    # PPH Risk:  - Medium, 12cm fibroid (no prior uterine incisions, sarmiento, <5 prior vaginal deliveries, no h/o pph or bleeding disorder), Type and screen ordered     Patient discussed with Dr. Ashwin Obregon MD   Obstetrics & Gynecology, PGY-2  11/18/2024 5:45 PM     OBGYN Attending Attestation    I, Tyrell Christopher, saw Ravi Peñaloza independent of the resident, Dr. Obregon, and agree with their findings and plan of care as documented in the above note.    I personally reviewed patient's history, labs, physical  exam, vitals.      Key findings: Patient is a 44 year old  at 32w4d who has worsening blood pressures. She was diagnosed with gestational hypertension approximately one week ago. She was noted to have BP 150s/100s in [facility] clinic today, so was sent over for preeclampsia evaluation. She is asymptomatic of preeclampsia. Her HELLP labs were normal. She does not have a diagnosis of preeclampsia today, however with rapidly worsening blood pressures at this gestational age, FGR, AMA, and GDMA2, will admit for betamethasone administration with close glucose monitoring and increase in insulin with anticipation that her blood pressures may worsen and she may meet criteria for delivery within the week. Reviewed with the patient her diagnosis of gestational hypertension and absence of a recommendation for delivery at this time, so also offered for close outpatient monitoring of her blood pressures. Patient elected to continue with admission and betamethasone administration. Modified insulin regimen during betamethasone window as above, no signs of DKA on admission.      I agree with the plan for admission, betamethasone administration, and modified insulin regimen during betamethasone window. [facility] to assume care tomorrow.     Tyrell Christopher MD    [facility]  Obstetrics, Gynecology, and Women's Health  8:21 PM 2024

## 2024-11-19 LAB
ALBUMIN SERPL BCG-MCNC: 3 G/DL (ref 3.5–5.2)
ALP SERPL-CCNC: 120 U/L (ref 40–150)
ALT SERPL W P-5'-P-CCNC: 19 U/L (ref 0–50)
ANION GAP SERPL CALCULATED.3IONS-SCNC: 12 MMOL/L (ref 7–15)
AST SERPL W P-5'-P-CCNC: 17 U/L (ref 0–45)
BASOPHILS # BLD AUTO: 0 10E3/UL (ref 0–0.2)
BASOPHILS NFR BLD AUTO: 0 %
BILIRUB SERPL-MCNC: <0.2 MG/DL
BUN SERPL-MCNC: 11.1 MG/DL (ref 6–20)
CALCIUM SERPL-MCNC: 9.6 MG/DL (ref 8.8–10.4)
CHLORIDE SERPL-SCNC: 106 MMOL/L (ref 98–107)
CREAT SERPL-MCNC: 0.63 MG/DL (ref 0.51–0.95)
EGFRCR SERPLBLD CKD-EPI 2021: >90 ML/MIN/1.73M2
EOSINOPHIL # BLD AUTO: 0 10E3/UL (ref 0–0.7)
EOSINOPHIL NFR BLD AUTO: 0 %
ERYTHROCYTE [DISTWIDTH] IN BLOOD BY AUTOMATED COUNT: 13.7 % (ref 10–15)
GLUCOSE BLDC GLUCOMTR-MCNC: 109 MG/DL (ref 70–99)
GLUCOSE BLDC GLUCOMTR-MCNC: 117 MG/DL (ref 70–99)
GLUCOSE BLDC GLUCOMTR-MCNC: 119 MG/DL (ref 70–99)
GLUCOSE BLDC GLUCOMTR-MCNC: 131 MG/DL (ref 70–99)
GLUCOSE BLDC GLUCOMTR-MCNC: 141 MG/DL (ref 70–99)
GLUCOSE BLDC GLUCOMTR-MCNC: 142 MG/DL (ref 70–99)
GLUCOSE BLDC GLUCOMTR-MCNC: 144 MG/DL (ref 70–99)
GLUCOSE BLDC GLUCOMTR-MCNC: 148 MG/DL (ref 70–99)
GLUCOSE BLDC GLUCOMTR-MCNC: 149 MG/DL (ref 70–99)
GLUCOSE SERPL-MCNC: 120 MG/DL (ref 70–99)
HCO3 SERPL-SCNC: 17 MMOL/L (ref 22–29)
HCT VFR BLD AUTO: 35.6 % (ref 35–47)
HGB BLD-MCNC: 12.2 G/DL (ref 11.7–15.7)
IMM GRANULOCYTES # BLD: 0.1 10E3/UL
IMM GRANULOCYTES NFR BLD: 1 %
LYMPHOCYTES # BLD AUTO: 1.6 10E3/UL (ref 0.8–5.3)
LYMPHOCYTES NFR BLD AUTO: 17 %
MCH RBC QN AUTO: 29.3 PG (ref 26.5–33)
MCHC RBC AUTO-ENTMCNC: 34.3 G/DL (ref 31.5–36.5)
MCV RBC AUTO: 85 FL (ref 78–100)
MONOCYTES # BLD AUTO: 0.2 10E3/UL (ref 0–1.3)
MONOCYTES NFR BLD AUTO: 2 %
NEUTROPHILS # BLD AUTO: 7.8 10E3/UL (ref 1.6–8.3)
NEUTROPHILS NFR BLD AUTO: 81 %
NRBC # BLD AUTO: 0 10E3/UL
NRBC BLD AUTO-RTO: 0 /100
PLATELET # BLD AUTO: 238 10E3/UL (ref 150–450)
POTASSIUM SERPL-SCNC: 4.2 MMOL/L (ref 3.4–5.3)
PROT SERPL-MCNC: 6.4 G/DL (ref 6.4–8.3)
RBC # BLD AUTO: 4.17 10E6/UL (ref 3.8–5.2)
SODIUM SERPL-SCNC: 135 MMOL/L (ref 135–145)
WBC # BLD AUTO: 9.6 10E3/UL (ref 4–11)

## 2024-11-19 PROCEDURE — 250N000013 HC RX MED GY IP 250 OP 250 PS 637: Performed by: STUDENT IN AN ORGANIZED HEALTH CARE EDUCATION/TRAINING PROGRAM

## 2024-11-19 PROCEDURE — 80048 BASIC METABOLIC PNL TOTAL CA: CPT

## 2024-11-19 PROCEDURE — 99232 SBSQ HOSP IP/OBS MODERATE 35: CPT | Mod: 25 | Performed by: STUDENT IN AN ORGANIZED HEALTH CARE EDUCATION/TRAINING PROGRAM

## 2024-11-19 PROCEDURE — 82247 BILIRUBIN TOTAL: CPT

## 2024-11-19 PROCEDURE — 250N000011 HC RX IP 250 OP 636

## 2024-11-19 PROCEDURE — 85048 AUTOMATED LEUKOCYTE COUNT: CPT

## 2024-11-19 PROCEDURE — 80053 COMPREHEN METABOLIC PANEL: CPT

## 2024-11-19 PROCEDURE — 87653 STREP B DNA AMP PROBE: CPT

## 2024-11-19 PROCEDURE — 250N000013 HC RX MED GY IP 250 OP 250 PS 637

## 2024-11-19 PROCEDURE — 36415 COLL VENOUS BLD VENIPUNCTURE: CPT

## 2024-11-19 PROCEDURE — 120N000002 HC R&B MED SURG/OB UMMC

## 2024-11-19 PROCEDURE — 82435 ASSAY OF BLOOD CHLORIDE: CPT

## 2024-11-19 PROCEDURE — 85004 AUTOMATED DIFF WBC COUNT: CPT

## 2024-11-19 PROCEDURE — 59025 FETAL NON-STRESS TEST: CPT | Mod: 26 | Performed by: STUDENT IN AN ORGANIZED HEALTH CARE EDUCATION/TRAINING PROGRAM

## 2024-11-19 RX ORDER — DOCUSATE SODIUM 100 MG/1
100 CAPSULE, LIQUID FILLED ORAL 2 TIMES DAILY PRN
Status: DISCONTINUED | OUTPATIENT
Start: 2024-11-19 | End: 2024-11-21 | Stop reason: HOSPADM

## 2024-11-19 RX ORDER — LEVOTHYROXINE SODIUM 112 UG/1
56 TABLET ORAL WEEKLY
Status: DISCONTINUED | OUTPATIENT
Start: 2024-11-20 | End: 2024-11-21 | Stop reason: HOSPADM

## 2024-11-19 RX ORDER — ASPIRIN 81 MG/1
81 TABLET, CHEWABLE ORAL AT BEDTIME
Status: DISCONTINUED | OUTPATIENT
Start: 2024-11-19 | End: 2024-11-21 | Stop reason: HOSPADM

## 2024-11-19 RX ORDER — PANTOPRAZOLE SODIUM 40 MG/1
40 TABLET, DELAYED RELEASE ORAL 2 TIMES DAILY PRN
Status: DISCONTINUED | OUTPATIENT
Start: 2024-11-19 | End: 2024-11-21 | Stop reason: HOSPADM

## 2024-11-19 RX ADMIN — PRENATAL VIT W/ FE FUMARATE-FA TAB 27-0.8 MG 1 TABLET: 27-0.8 TAB at 13:59

## 2024-11-19 RX ADMIN — BETAMETHASONE SODIUM PHOSPHATE AND BETAMETHASONE ACETATE 12 MG: 3; 3 INJECTION, SUSPENSION INTRA-ARTICULAR; INTRALESIONAL; INTRAMUSCULAR at 20:13

## 2024-11-19 RX ADMIN — ASPIRIN 81 MG CHEWABLE TABLET 81 MG: 81 TABLET CHEWABLE at 22:08

## 2024-11-19 RX ADMIN — Medication 112 MCG: at 06:06

## 2024-11-19 NOTE — PROGRESS NOTES
Data: Patient assessed in the [facility] for elevated BP's with hypertension disorder of pregnancy and FGR, GDM in insulin.  Cervical exam deferred. Membranes intact. Contractions are not present. See flowsheets for fetal assessment documentation.     Action: Presumed adequate fetal oxygenation documented. Patient reports active fetal movement, and denies vaginal leaking of fluid or bleeding, abdominal pain, or any signs/symptoms of pre-eclampsia.    Response: Dr. Trevizo notified of patient's arrival from clinic with elevated BP's, none in severe range, EFM Cat I. Labs ordered by MD, and patient verbalized understanding and agreement with plan.

## 2024-11-19 NOTE — PLAN OF CARE
Data: Blood pressures  remain frequently elevated, none in severe range . Signs and symptoms of pre-eclampsia absent. Fetal assessment AGA.  Interventions: Monitor blood pressures every 2 hours while awake, otherwise every 4 hours, and indicators of pre-eclampsia every 4 hours. Continue uterine/fetal assessment BID. Activity level Regular activity. Preventive measures include Positioning and Frequent voiding. Encourage active range of motion and frequent position changes.  Plan: Continue expectant management. Observe for and notify care provider of indicators of worsening GHTN or s/s of pre-eclampsia or signs of fetal/maternal compromise.   Goal Outcome Evaluation:      Plan of Care Reviewed With: patient    Overall Patient Progress: improving

## 2024-11-19 NOTE — PROVIDER NOTIFICATION
11/19/24 0617   Provider Notification   Provider Name/Title MD Sabas   Method of Notification Electronic Page   Request Evaluate - Remote   Notification Reason Other (Comment)     Pt. Reported she takes 168mcg (1.5 pills) of synthroid 1x a week and would like this to be reflected in her orders for Wednesday AM.

## 2024-11-19 NOTE — UTILIZATION REVIEW
Admission Status; Secondary Review Determination         Under the authority of the Utilization Management Committee, the utilization review process indicated a secondary review on the above patient.  The review outcome is based on review of the medical records, discussions with staff, and applying clinical experience noted on the date of the review.        (x)      Inpatient Status Appropriate - This patient's medical care is consistent with medical management for inpatient care and reasonable inpatient medical practice.     RATIONALE FOR DETERMINATION     The patient is a 44-year-old female  1 para 0 at 32 weeks and 5 days x 7-week ultrasound.  This case is a insurance denial in the hospital.  Patient was found to have gestational hypertension a week ago and now is of concern for gestational diabetes with need for onset of insulin.  Patient is admitted for stabilization of both blood pressure and diabetes with anticipated discharge in 2 to 4 days if the patient is stable.  Patient's blood pressure as of this afternoon is as high as 152/96 and not stabilized at this time.  Based on high risk pregnancy and need for stabilization, recommend continuation of inpatient status.  The patient is receiving  steroids for lung maturity in the event that decision for emergent delivery is required.    The severity of illness, intensity of service provided, expected LOS and risk for adverse outcome make the care complex, high risk and appropriate for hospital admission.        The information on this document is developed by the utilization review team in order for the business office to ensure compliance.  This only denotes the appropriateness of proper admission status and does not reflect the quality of care rendered.         The definitions of Inpatient Status and Observation Status used in making the determination above are those provided in the CMS Coverage Manual, Chapter 1 and Chapter 6, section 70.4.       Sincerely,     Jerry Wu MD  Physician Advisor  Utilization Review/ Case Management  [facility].

## 2024-11-19 NOTE — PLAN OF CARE
Patient arrived to antepartum and writer assumed care at 2030. VSS, oriented to unit and antepartum stay. Provided hat to monitor Is and Os. IV placed and insulin orders and parameters explained. Patient offers no complaints and is agreeable to care plan. Spouse at bedside providing support. Questions answered and encouraged. Call light within reach. Report given to NOEMI Nunez        Goal Outcome Evaluation:  Progressing, reviewed with patient.

## 2024-11-19 NOTE — PROGRESS NOTES
Pt. Here in the setting of Pre-E eval, GHTN, GDMA2, FGR, L. Fibroid, & AMA, . VSS & afebrile. S/SX of infection absent. Patient eating and drinking normally, voiding without difficulty, and is up at gabo. R. PIV SL and patent. Denies Headache, visual changes, RUQ pain, SOB, chest pain, LOF, or vaginal bleeding.  See flowsheets for fetal and uterine monitoring.  Report given to oncoming RNJessica. Continue current plan of care

## 2024-11-19 NOTE — PROGRESS NOTES
Maternal-Fetal Medicine   Antepartum Progress Note    Subjective   Feeling well today. She denies any headaches, vision changes, nausea, vomiting, chest pain, shortness of breath, or RUQ pain. Denies contractions, leaking of fluid, vaginal bleeding, or decreased fetal movement. Of note, the patient reports that her fasting blood sugars are typically in the 80's at home, prior to receiving betamethasone.     Pregnancy notable for:   - gHTN  - FGR (EFW 5% AC 3%, UAD nml, BPP 8/8 11/18)  - GDMA2  - Hypothyroidism  - 12cm left lateral fibroid  - AMA     Objective     Vitals:    11/19/24 0158 11/19/24 0601 11/19/24 0753 11/19/24 0943   BP: 136/82 132/76  (!) 152/96   BP Location: Left arm Right arm  Right arm   Patient Position: Semi-Acevedo's Semi-Acevedo's  Sitting   Cuff Size: Adult Regular Adult Regular  Adult Regular   Pulse:       Resp: 16  18    Temp:   98.2  F (36.8  C)    TempSrc:   Oral    Weight:  87.4 kg (192 lb 9.6 oz)     Height:           I/O last 3 completed shifts:  In: -   Out: 1130 [Urine:1130]    Gen: Resting comfortably in bed, NAD  CV: Regular rate, appears well perfused.   Resp: Breathing comfortably on room air   Abd: Gravid, non-tender, non-distended  Ext: non-tender, no edema    FHT: 130 bpm, moderate variability, accelerations present, no decelerations  [place]: flat  Impression: reactive and reassuring    Labs:   Latest Reference Range & Units 11/19/24 07:02   Sodium 135 - 145 mmol/L 135   Potassium 3.4 - 5.3 mmol/L 4.2   Chloride 98 - 107 mmol/L 106   Carbon Dioxide (CO2) 22 - 29 mmol/L 17 (L)   Urea Nitrogen 6.0 - 20.0 mg/dL 11.1   Creatinine 0.51 - 0.95 mg/dL 0.63   GFR Estimate >60 mL/min/1.73m2 >90   Calcium 8.8 - 10.4 mg/dL 9.6   Anion Gap 7 - 15 mmol/L 12   Albumin 3.5 - 5.2 g/dL 3.0 (L)   Protein Total 6.4 - 8.3 g/dL 6.4   Alkaline Phosphatase 40 - 150 U/L 120   ALT 0 - 50 U/L 19   AST 0 - 45 U/L 17   Bilirubin Total <=1.2 mg/dL <0.2   Glucose 70 - 99 mg/dL 120 (H)   WBC 4.0 - 11.0  10e3/uL 9.6   Hemoglobin 11.7 - 15.7 g/dL 12.2   Hematocrit 35.0 - 47.0 % 35.6   Platelet Count 150 - 450 10e3/uL 238   RBC Count 3.80 - 5.20 10e6/uL 4.17   MCV 78 - 100 fL 85   MCH 26.5 - 33.0 pg 29.3   MCHC 31.5 - 36.5 g/dL 34.3   RDW 10.0 - 15.0 % 13.7   % Neutrophils % 81   % Lymphocytes % 17   % Monocytes % 2   % Eosinophils % 0   % Basophils % 0   Absolute Basophils 0.0 - 0.2 10e3/uL 0.0   Absolute Eosinophils 0.0 - 0.7 10e3/uL 0.0   Absolute Immature Granulocytes <=0.4 10e3/uL 0.1   Absolute Lymphocytes 0.8 - 5.3 10e3/uL 1.6   Absolute Monocytes 0.0 - 1.3 10e3/uL 0.2   % Immature Granulocytes % 1   Absolute Neutrophils 1.6 - 8.3 10e3/uL 7.8   Absolute NRBCs 10e3/uL 0.0   NRBCs per 100 WBC <1 /100 0      Latest Reference Range & Units 24 20:55 24 22:06 24 01:58 24 06:00   GLUCOSE BY METER POCT 70 - 99 mg/dL 126 (H) 149 (H) 144 (H) 119 (H)   (H): Data is abnormally high    Assessment/Plan   Ravi Peñaloza is a 44 year old  @ 32w5d by 7w6d US, on HD #2 for rule out preeclampsia w/SF in the setting of increasing mild range blood pressures found in the [facility] office. Given AMA and blood pressure trajectory (just met criteria for gHTN last week, now having more persistent elevated blood pressures), there is concern that she may be trending toward SR BP. She was admitted for  steroids and serial blood pressure monitoring. Will also continue to monitor blood sugars and adjust insulin as needed throughout her betamethasone window.     # Gestational hypertension   # R/o preeclampsia   - Serial BP monitoring, Q4hr BP checks    - BPs: Normotensive to low mild range on admission, high mild range /96 this AM  - Antihypertensives: IV antihypertensives PRN for sustained severe range blood pressures (SBP>160, DBP>110 sustained over 15 minutes)  - Labs: HELLP labs, UPC 0.17 on ; repeat today  WNL.   - Daily weights, strict I&Os  - No subjective signs/symptoms preeclampsia  -  Discussed delivery timing at 37w0d unless indicated sooner  - NICU consult PRN if imminent delivery   - continue PTA aspirin 81 mg    # Gestational Diabetes A2    Pre admission regimen NPH 8 units at bedtime. Blood glucoses over the last week have been in 80s for AM fasting per patient report. She does not follow with Endocrinology. Increased to NPH 10 units yesterday evening with fasting glucose 119 this AM.  - Given planned BMZ course, steroid plan as below:                  - HOLD PTA NPH 8 units at bedtime                  - NPH 10 units 11/18 PM (25% increase)                  - NPH 11 units 11/19, 11/20 PM (40% increase); will increase to 12 units                 - NPH 10 units 11/21 PM                  - NPH 8 units 11/22-forward   - Medium sliding scale insulin   - Blood Glucose Checks: Fasting, TID AC, PP, at bedtime   - Insulin gtt PRN + D5 NS PRN for labile sugars in labor PRN     # Hypothyroidism   - PTA levothyroxine 112mcg PO qdaily, will take dose and a half (168mcg PO) on 11/20 per her endocrinologist's recommendation  - TSH 0.823 10/20/24      # PNC  - Rh positive, Rubella immune  - GBS pending  - Other prenatal labs wnl  - Imaging: placenta   - S/p TDaP 11/5; flu, COVID declined                    # FWB:   # Fetal growth restriction   - NST reactive on admission   - BID NSTs   - BMZ for fetal lung maturity, dose #1/2, dose 2 scheduled @ 2000 (11/19).  - NICU for delivery PRN   - Intrauterine resuscitative measures PRN  - Growth US scheduled for tomorrow, 11/20 (ord'd)     # PPH Risk:  - Medium, 12cm fibroid (no prior uterine incisions, sarmiento, <5 prior vaginal deliveries, no h/o pph or bleeding disorder), Type and screen ordered     Clinically Significant Risk Factors Present on Admission               # Hypoalbuminemia: Lowest albumin = 3 g/dL at 11/19/2024  7:02 AM, will monitor as appropriate   # Drug Induced Platelet Defect: home medication list includes an antiplatelet medication   #  "Hypertension: Noted on problem list                      Medically Ready for Discharge: Anticipated in 2-4 Days    Patient seen and discussed with Dr. Vianey Batista, PGY-2 and Dr. Alexandria Le.    Dee Dee Orozco, MS4    M Attending Attestation  I saw this patient with the resident and agree with the resident's findings and plan of care as documented in the resident's note. I personally reviewed her vital signs, medications, labs, pertinent imaging, and fetal monitoring. In summary, Ms. Peñaloza is a 44 year old  at 32w5d admitted for increasing BP in the setting of recent gestational hypertension diagnosis. Also with GDMA2 and FGR. Given her BP at high mild range, she was initiated on course of BMTZ. Will continue serial BP monitoring and adjustment of insulin for her GDMA2 while receiving BMTZ. She is currently asymptomatic, labs stable, and fetal status is reassuring.    BP (!) 152/96 (BP Location: Right arm, Patient Position: Sitting, Cuff Size: Adult Regular)   Pulse 74   Temp 98.2  F (36.8  C) (Oral)   Resp 18   Ht 1.549 m (5' 1\")   Wt 87.4 kg (192 lb 9.6 oz)   LMP 2024 (Exact Date)   BMI 36.39 kg/m          Time Spent on this Encounter   I spent a total of 45 minutes face-to-face or coordinating care of Ms. Peñaloza. Over 50% of my time on the unit was spent counseling the patient and /or coordinating care regarding diagnosis, diagnostic results, prognosis and risks and benefits of treatment options.     Date of service (when I saw the patient): 2024     Claudette Le MD   , OB/GYN  Maternal-Fetal Medicine  "

## 2024-11-19 NOTE — DISCHARGE SUMMARY
[facility] Discharge Summary    Ravi Peñaloza MRN# [id number]   Age: 44 year old YOB: 1980     Date of Admission:  2024  Date of Discharge:  24   Admitting Physician:  Tyrell Christopher MD  Discharge Physician:  Claudette Le MD     Admission Diagnosis:  - IUP at 32w4d  - Gestational hypertension   - Fetal growth restriction (EFW 5%, AC 3%)   - Gestational diabetes A2   - Hypothyroidism   - Advanced maternal age     Discharge Diagnosis:  -  at 33w0d  - Gestational hypertension   - Severe fetal growth restriction (EFW 1%, AC <1%)  - Gestational diabetes A2   - Hypothyroidism   - Advanced maternal age     Procedures:    - Antepartum fetal monitoring     Consultations:    [facility]  Endocrine - Diabetes consult  Nutrition    Medications prior to admission:  Medications Prior to Admission   Medication Sig Dispense Refill Last Dose/Taking    acetone urine (KETOSTIX) test strip Check ketones once daily in urine 100 strip 2 2024 Morning    aspirin (ASA) 81 MG chewable tablet Take 81 mg by mouth daily.   2024 Evening    insulin NPH (NOVOLIN N FLEXPEN) 100 UNIT/ML injection Inject 8 Units subcutaneously at bedtime. 15 mL 3 2024 Bedtime    insulin pen needle (31G X 5 MM) 31G X 5 MM miscellaneous Use 1 pen needles daily or as directed. 30 each 1 2024 Bedtime    levothyroxine (SYNTHROID/LEVOTHROID) 112 MCG tablet Take 1 tablet (112 mcg) by mouth daily. Plus additional 1/2 tab weekly 60 tablet 0 2024 Morning    levothyroxine (SYNTHROID/LEVOTHROID) 88 MCG tablet Take 1 tablet (88 mcg) by mouth daily. 90 tablet 3 More than a month    Prenatal MV-Min-Fe Fum-FA-DHA (PRENATAL MULTIVITAMIN PLUS DHA PO) Take 1 tablet by mouth daily.   2024 Evening       Brief History of Presentation:    Ravi Peñaloza is a 44 year old  at 32w4d by 7w6d US, here for rule out preeclampsia w/SF after having high mild range blood pressures in [facility] clinic and  home in the setting of gestational hypertension. Pregnancy is notable for gHTN, GDMA2, FGR (EFW 5%), hypothyroidism, uterine fibroid, and AMA. Although HELLP labs/UPC normal, blood pressures largely mild range while undergoing workup. Given AMA and blood pressure trajectory (just met criteria for gHTN last week, now having more persistent elevated blood pressures) she was admitted for serial BP monitoring and BMTZ.    Hospital Course:    Following admission she received her full course of  corticosteroids which were completed on 2024. Her insulin was adjusted during her betamethasone window. Diabetes was consulted given suboptimal control despite adjustments of her insulin regimen per protocol. She was started on Novolog meal coverage and long acting insulin was modified. Her blood pressures remained normotensive to mild range and she did not meet criteria for preeclampsia with severe features at any point during her stay. She remained asymptomatic and otherwise hemodynamically stable while in house. She has a plan for her insulin upon discharge as provided by diabetes team.    She underwent regular fetal monitoring. On hospital day #3, she had repeat fetal growth ultrasound which was notable for less than expected interval growth, now meeting criteria for severe fetal growth restriction. UA doppler studies were within normal limits. She had a BPP 8/10 on  (-2 for breathing) and intermittently non-reactive fetal heart rate tracings. No decelerations and overall moderate variability. On , she had 8/10 BPP (-2 for non-reactive tracing, only 1 15x15 acceleration). She will follow up with her primary OB clinic for routine care and with [facility] ultrasound on  for fetal surveillance. Strict return precautions given. Anticipate delivery in the early part of the [id number] window, and this was discussed with the patient.    Discharge Instructions:  Call or present to labor and delivery if you  experience:   -Regular painful contractions concerning for labor   -Leakage of fluid concerning for ruptured membranes   -Decreased fetal movement   -Bright red vaginal bleeding    -Headache, vision changes, upper abdominal pain, significant increase in swelling,   generalized unwell feeling    Follow up:  Follow up in MFM clinic on 11/22 for MFM ultrasound (NST, UA Dopplers) and in primary OB clinic for prenatal care.      Discharge Medications:     Review of your medicines        START taking        Dose / Directions   * insulin aspart 100 UNIT/ML pen  Commonly known as: NovoLOG PEN  Used for: Insulin controlled gestational diabetes mellitus (GDM) in third trimester      Dose: 1-8 Units  Inject 1-8 Units subcutaneously 3 times daily (before meals).  Quantity: 15 mL  Refills: 0     * insulin aspart 100 UNIT/ML pen  Commonly known as: NovoLOG PEN  Used for: Insulin controlled gestational diabetes mellitus (GDM) in third trimester      Dose: 1-5 Units  Inject 1-5 Units subcutaneously at bedtime.  Quantity: 15 mL  Refills: 0           * This list has 2 medication(s) that are the same as other medications prescribed for you. Read the directions carefully, and ask your doctor or other care provider to review them with you.                CONTINUE these medicines which have NOT CHANGED        Dose / Directions   acetone urine test strip  Commonly known as: KETOSTIX  Used for: Gestational diabetes      Check ketones once daily in urine  Quantity: 100 strip  Refills: 2     aspirin 81 MG chewable tablet  Commonly known as: ASA      Dose: 81 mg  Take 81 mg by mouth daily.  Refills: 0     insulin pen needle 31G X 5 MM miscellaneous  Commonly known as: 31G X 5 MM  Used for: Insulin controlled gestational diabetes mellitus (GDM) in third trimester      Use 1 pen needles daily or as directed.  Quantity: 30 each  Refills: 1     * levothyroxine 88 MCG tablet  Commonly known as: SYNTHROID/LEVOTHROID  Used for: Hypothyroidism due to  Hashimoto thyroiditis      Dose: 88 mcg  Take 1 tablet (88 mcg) by mouth daily.  Quantity: 90 tablet  Refills: 3     * levothyroxine 112 MCG tablet  Commonly known as: SYNTHROID/LEVOTHROID  Used for: Hypothyroidism due to Hashimoto thyroiditis      Dose: 112 mcg  Take 1 tablet (112 mcg) by mouth daily. Plus additional 1/2 tab weekly  Quantity: 60 tablet  Refills: 0     NovoLIN N FlexPen 100 UNIT/ML injection  Used for: Insulin controlled gestational diabetes mellitus (GDM) in third trimester  Generic drug: insulin NPH      Dose: 8 Units  Inject 8 Units subcutaneously at bedtime.  Quantity: 15 mL  Refills: 3     PRENATAL MULTIVITAMIN PLUS DHA PO      Dose: 1 tablet  Take 1 tablet by mouth daily.  Refills: 0           * This list has 2 medication(s) that are the same as other medications prescribed for you. Read the directions carefully, and ask your doctor or other care provider to review them with you.                   Where to get your medicines        These medications were sent to [place] [id number] [place] [address] [address]      Phone: [phone]   insulin aspart 100 UNIT/ML pen  insulin aspart 100 UNIT/ML pen       Shlomo Sheriff MD  OB/GYN PGY-3  11/21/2024 2:27 PM    I saw the patient on the date of discharge and agree with the above documentation by Dr. Sheriff.    Claudette Le MD   Maternal-Fetal Medicine Physician

## 2024-11-20 ENCOUNTER — APPOINTMENT (OUTPATIENT)
Dept: ULTRASOUND IMAGING | Facility: CLINIC | Age: 44
DRG: 832 | End: 2024-11-20
Payer: COMMERCIAL

## 2024-11-20 LAB
ALBUMIN SERPL BCG-MCNC: 3 G/DL (ref 3.5–5.2)
ALP SERPL-CCNC: 114 U/L (ref 40–150)
ALT SERPL W P-5'-P-CCNC: 19 U/L (ref 0–50)
ANION GAP SERPL CALCULATED.3IONS-SCNC: 12 MMOL/L (ref 7–15)
AST SERPL W P-5'-P-CCNC: 16 U/L (ref 0–45)
BILIRUB SERPL-MCNC: <0.2 MG/DL
BUN SERPL-MCNC: 14.5 MG/DL (ref 6–20)
CALCIUM SERPL-MCNC: 8.9 MG/DL (ref 8.8–10.4)
CHLORIDE SERPL-SCNC: 106 MMOL/L (ref 98–107)
CREAT SERPL-MCNC: 0.69 MG/DL (ref 0.51–0.95)
EGFRCR SERPLBLD CKD-EPI 2021: >90 ML/MIN/1.73M2
ERYTHROCYTE [DISTWIDTH] IN BLOOD BY AUTOMATED COUNT: 14 % (ref 10–15)
GLUCOSE BLDC GLUCOMTR-MCNC: 111 MG/DL (ref 70–99)
GLUCOSE BLDC GLUCOMTR-MCNC: 111 MG/DL (ref 70–99)
GLUCOSE BLDC GLUCOMTR-MCNC: 122 MG/DL (ref 70–99)
GLUCOSE BLDC GLUCOMTR-MCNC: 124 MG/DL (ref 70–99)
GLUCOSE BLDC GLUCOMTR-MCNC: 130 MG/DL (ref 70–99)
GLUCOSE BLDC GLUCOMTR-MCNC: 132 MG/DL (ref 70–99)
GLUCOSE BLDC GLUCOMTR-MCNC: 146 MG/DL (ref 70–99)
GLUCOSE SERPL-MCNC: 129 MG/DL (ref 70–99)
GP B STREP DNA SPEC QL NAA+PROBE: NEGATIVE
HCO3 SERPL-SCNC: 18 MMOL/L (ref 22–29)
HCT VFR BLD AUTO: 33.2 % (ref 35–47)
HGB BLD-MCNC: 11.5 G/DL (ref 11.7–15.7)
MCH RBC QN AUTO: 30.3 PG (ref 26.5–33)
MCHC RBC AUTO-ENTMCNC: 34.6 G/DL (ref 31.5–36.5)
MCV RBC AUTO: 87 FL (ref 78–100)
PLATELET # BLD AUTO: 208 10E3/UL (ref 150–450)
POTASSIUM SERPL-SCNC: 4.1 MMOL/L (ref 3.4–5.3)
PROT SERPL-MCNC: 6.1 G/DL (ref 6.4–8.3)
RBC # BLD AUTO: 3.8 10E6/UL (ref 3.8–5.2)
SODIUM SERPL-SCNC: 136 MMOL/L (ref 135–145)
WBC # BLD AUTO: 11.9 10E3/UL (ref 4–11)

## 2024-11-20 PROCEDURE — 76820 UMBILICAL ARTERY ECHO: CPT | Mod: 26 | Performed by: STUDENT IN AN ORGANIZED HEALTH CARE EDUCATION/TRAINING PROGRAM

## 2024-11-20 PROCEDURE — 76816 OB US FOLLOW-UP PER FETUS: CPT | Mod: 26 | Performed by: STUDENT IN AN ORGANIZED HEALTH CARE EDUCATION/TRAINING PROGRAM

## 2024-11-20 PROCEDURE — 76816 OB US FOLLOW-UP PER FETUS: CPT

## 2024-11-20 PROCEDURE — 120N000002 HC R&B MED SURG/OB UMMC

## 2024-11-20 PROCEDURE — 76819 FETAL BIOPHYS PROFIL W/O NST: CPT

## 2024-11-20 PROCEDURE — 76819 FETAL BIOPHYS PROFIL W/O NST: CPT | Mod: 26 | Performed by: STUDENT IN AN ORGANIZED HEALTH CARE EDUCATION/TRAINING PROGRAM

## 2024-11-20 PROCEDURE — 99232 SBSQ HOSP IP/OBS MODERATE 35: CPT | Mod: 25 | Performed by: STUDENT IN AN ORGANIZED HEALTH CARE EDUCATION/TRAINING PROGRAM

## 2024-11-20 PROCEDURE — 80053 COMPREHEN METABOLIC PANEL: CPT

## 2024-11-20 PROCEDURE — 36415 COLL VENOUS BLD VENIPUNCTURE: CPT

## 2024-11-20 PROCEDURE — 85018 HEMOGLOBIN: CPT

## 2024-11-20 PROCEDURE — 82310 ASSAY OF CALCIUM: CPT

## 2024-11-20 PROCEDURE — 85041 AUTOMATED RBC COUNT: CPT

## 2024-11-20 PROCEDURE — 250N000013 HC RX MED GY IP 250 OP 250 PS 637

## 2024-11-20 PROCEDURE — 250N000013 HC RX MED GY IP 250 OP 250 PS 637: Performed by: STUDENT IN AN ORGANIZED HEALTH CARE EDUCATION/TRAINING PROGRAM

## 2024-11-20 PROCEDURE — 82040 ASSAY OF SERUM ALBUMIN: CPT

## 2024-11-20 PROCEDURE — 99223 1ST HOSP IP/OBS HIGH 75: CPT | Performed by: NURSE PRACTITIONER

## 2024-11-20 RX ADMIN — Medication 112 MCG: at 07:11

## 2024-11-20 RX ADMIN — ASPIRIN 81 MG CHEWABLE TABLET 81 MG: 81 TABLET CHEWABLE at 22:21

## 2024-11-20 RX ADMIN — LEVOTHYROXINE SODIUM 56 MCG: 112 TABLET ORAL at 07:12

## 2024-11-20 RX ADMIN — PRENATAL VIT W/ FE FUMARATE-FA TAB 27-0.8 MG 1 TABLET: 27-0.8 TAB at 15:44

## 2024-11-20 NOTE — CONSULTS
Inpatient Diabetes Management Service : New Consult Note     Patient: Ravi Peñaloza   YOB: 1980    MRN: [id number]     Date of Consult : 2024   Date of Admission: 2024     Reason for Consult: Gestational diabetes, increased BG in setting of Betamethasone, assistance with insulin plan for discharge.    Consult Requestor: Dr. Shlomo Sheriff            History of Present Illness:   Ravi Peñaloza is a 44 year old with Gestational Diabetes Mellitus   at 32w4d by 7w6d US, who presents for elevated blood pressures in the setting of gestational hypertension. Inpatient Diabetes Service consulted for increased BG in setting of Betamethasone, assistance with insulin plan for discharge.  .     History obtained via the patient, chart review and discussion with primary team.       Additional Historian:  none    Patient is not known to the Inpatient Diabetes Service from past admission(s).    The patient denies any previous history of DM. At 16 weeks, she completed a GTT and diagnosed with GDM. She was living in [place] at the time.  Initially managed with dietary changes, in October noted fasting was becoming elevated. She was started on NPH 8 units at bedtime at that time. She reports well control of her diabetes after starting NPH 8 units. FBGs were in the 80's and one postprandial readings were within range.     In hospital, she has received 2 doses of Betamethasone 12 mg on  at  and 12 mg on  at . BGs elevated with initial dose of Betamethasone into the 140's overnight on  fasting , post prandial BGs slightly above target at 149 after breakfast and prior to dinner fasting was 141. Patient did not have carb coverage ordered. Overnight scale started correction <200.      Last dose insulin PTA: NPH 8 units    Current inpatient regimen:  NPH 12 units at  2200   BG at time of consult: 130  Planned Procedures/Surgeries: none     Relevant Labs on Admission:  if  contributory, otherwise see labs below  Renal function: Creatinine (0.68), eGFR (>90)    BMP: Na (136), K (3.8) Bicarb (18), Anion Gap (12), Glucose (80)  BhB: N/A    Lactic Acid: N/A   Infectious Disease: COVID (N/A ), Influenza (N/A), Blood Cultures (N/A)       Inpatient Glucose Trends:           Diabetes History:   Diabetes Type and Duration: Diagnosed at 16 weeks pregnant. In October 2023, started on NPH 8 units due to elevated fasting blood sugars.   GAD65 antibody, zinc transporter 8 antibody, islet antibody, insulin antibody, and c-peptide  not available on epic search     PTA Medication Regimen: NPH 8 unit at 2000 every night.   Missing doses?: none  Historical Diabetes Medications: none    Glucose monitoring device/frequency/trends: Glucometer. 6 times a day. Pre and post prandial.   Hypoglycemia PTA: Has not experienced hypoglycemia.     Outpatient Diabetes Provider: Dr. Elli simpson (last seen 11/4/24  Formal Diabetes Education/Educator: Diatician in [place] and  Miranda Sher RD [facility]  in [place]     ------------------------  Complications:  none    Most recent A1c: 5.6 (6/28/24)     Hemoglobin at time of most recent A1c: 12.4 ( 6/24/24)   RBC transfusion 3 months prior to most recent A1c:  none      History of DKA: no      Safety Plan: Eat if BG is low     Diet/Lifestyle/Living Situation: Eats three times of day. Has been following with a dietician and   has been concentrating on adding protein to her diet. Will eat 9 eggs a day some days (2 with each meal)  Will have eggs with just breakfast and then will have meat as a protein for lunch and dinner. Reports eating almonds for snack.   Ability to [place] Prescribed Regimen:  Yes, though new to carb counting    Food/Housing Insecurity: none           Medications Impacting Glycemia:    Steroids: Betamethasone 12 mg on 11/18 at 2000 and 12 mg on 11/19 at 2000.  D5W containing solutions/medications: none  Other medications  impacting glucose: none         Diet/Nutrition:    Orders Placed This Encounter      Low Consistent Carb (45 g CHO per Meal) Diet     Supplements:  none  TF: none  TPN: none           Review of Systems:    CC:   Constitutional: Denies fever and chills.    HEENT: Denies headache, vision changes, hearing changes, sinus congestion, and swollen glands.   Cardiac: Denies chest pain, palpitations, or racing heart.    Respiratory: Denies dyspnea on exertion and at rest. Denies wheezing and cough,   GI: Denies abdominal pain, tenderness and bloating. Denies nausea and vomiting. Denies changes in stool pattern, constipation and diarrhea.    : Denies difficulty urinating, dysuria, and incontinence.    MSK/Integumentary:Reports edema. Denies weakness. Denies new rashes, wounds, and bruising.    Endocrine: Increased urine due to pregnancy.          Past Medical History:       Past Medical History:   Diagnosis Date    Fibroid uterus     Gestational diabetes     Hypothyroidism     Migraine without aura and without status migrainosus, not intractable              Past Surgical History:      Past Surgical History:   Procedure Laterality Date    CHOLECYSTECTOMY  2018    LIPOSUCTION (LOCATION)  2014             Social History:      Social History     Tobacco Use    Smoking status: Never    Smokeless tobacco: Never   Substance Use Topics    Alcohol use: Not Currently     Comment: occasional prior to pregancy        History   Sexual Activity    Sexual activity: Yes    Partners: Male             Family History:    Family History of Diabetes: Father, Paternal Grandmother.     Family History   Problem Relation Age of Onset    Hypertension Mother     Osteoarthritis Mother     Hypothyroidism Mother     Hypertension Father     Diabetes Type 2  Father     No Known Problems Maternal Grandmother     No Known Problems Maternal Grandfather     Diabetes Type 2  Paternal Grandmother     Asthma Paternal Grandfather     No Known Problems Brother   "   No Known Problems Brother              Physical Exam:    BP (!) 143/88 (BP Location: Right arm, Patient Position: Semi-Acevedo's, Cuff Size: Adult Regular)   Pulse 84   Temp 98.2  F (36.8  C) (Oral)   Resp 18   Ht 1.549 m (5' 1\")   Wt 87.4 kg (192 lb 9.6 oz)   LMP 04/04/2024 (Exact Date)   BMI 36.39 kg/m     General:  well appearing, in no acute distress.  HEENT:  NC/AT  Lungs:  unremarkable  ABD:  rounded, soft, non-tender  Skin:  warm and dry  MSK:   moving all extremities  Lymp:   Trace LE edema  Mental Status:  Alert and oriented x3  Psych:   Cooperative, good eye contact, full/appropriate affect         Laboratory Data:    No results found for: \"A1C\"  Recent Labs   Lab Test 11/20/24  0702   WBC 11.9*   RBC 3.80   HGB 11.5*   HCT 33.2*   MCV 87   MCH 30.3   MCHC 34.6   RDW 14.0        Recent Labs   Lab Test 11/20/24  0719 11/20/24  0702 11/19/24  0913 11/19/24  0702   NA  --  136  --  135   POTASSIUM  --  4.1  --  4.2   CHLORIDE  --  106  --  106   CO2  --  18*  --  17*   ANIONGAP  --  12  --  12   * 129*   < > 120*   BUN  --  14.5  --  11.1   CR  --  0.69  --  0.63   LUNA  --  8.9  --  9.6    < > = values in this interval not displayed.     Recent Labs   Lab Test 11/20/24  0702   PROTTOTAL 6.1*   ALBUMIN 3.0*   BILITOTAL <0.2   ALKPHOS 114   AST 16   ALT 19            Assessment and Recommendations:       Assessment:   Hx of Gestational Diabetes Mellitus, no known complications. (A1c 5.6  %)  Acute steroid hyperglycemia; bmz completed 11/19      Plan/Recommendations:    - Increase NPH 14 units q 24 hrs at 2000   - Add Novolog Meal Coverage: 1 unit per 15 grams CHO, TID AC and PRN with snacks/supplements  - Novolog OB Correction Scale: medium resistance (ISF: 50) TID AC >100 mg/dL  change overnight schedule. Medium resistance (ISF:50)   HS / 0200 > 120 mg/dL  - BG Monitoring: TID AC / HS / 0200 and 1 hr PP to be used for information only   - Hypoglycemia protocol  - Carb counting protocol "   - IDS to follow, first call after hours to Primary team.     Management of Diabetes in pregnancy:  BG targets  Fasting glucose 70-95 mg/dL AND  One-hour postprandial glucose 110-140 mg/dL   Two-hour postprandial glucose 100-120 mg/dL  Goal of 0 episodes of blood sugars less than 60      A1c goal in pregnancy: 6.5% or less    Discussion:  Admitted for betamethasone administration with close glucose monitoring and need for increase in insulin.  No signs of DKA on admission. BG expected to rise with Betamethasone at 12 hours after administration and peak at 48 hours.  Can be elevated for 1 week.  2 doses of Betamethasone given:  12 mg on  at  and 12 mg on  at . Increasing NPH 12 to 14 units 20% to help with reducing fasting BGS while Betamethasone is still active. Changed short acting correction to start  120 not 200 overnight.  Added carb coverage to help with post prandial elevated BGS.     IP Diabetes Management Team Discharge Instructions     Glucose Control Regimen:  NPH __ units daily. Give at the same time everyday.      Carb coverage  with Novolo unit of Novolog per __ grams of carbs         Check your blood sugar before each meal and give Novolog correction scale, to reduce high blood sugar.      Correction Scale - MEDIUM INSULIN RESISTANCE DOSING     Do Not give Correction Insulin if Pre-Meal BG less than 100.   For  - 149 give 1 unit.   For  - 199 give 2 units.   For  - 249 give 3 units.   For  - 299 give 4 units.   For  - 349 give 5 units.   For  - 399 give 6 units.   For  - 449 give 7 units.   For BG greater than or equal to 450 give 8 units.   Pre-Meal correction to be given with prandial insulin, and based on pre-meal blood glucose. Administering insulin within 5 minutes of the start of the meal is ideal. Administer insulin no more than 30 minutes after the start of the meal, unless directed otherwise by provider.     Notify provider if  glucose greater than or equal to 350 mg/dL after administration of correction dose.        Check your blood sugar before bed at 9 pm and give Novolog correction scale, to reduce high blood sugar.     ISF 50  1 per 50 > 120   For  - 169 give 1 unit.   For  - 219 give 2 units.   For  - 269 give 3 units.   For  - 319 give 4 units.   For BG = or > 320 give 5 units.        Blood Glucose Checks: 3 three times daily before meals, and at bedtime. (Per CGM)   - If your blood glucose is less than 70 mg/dl 2 times in 1 day or for 2 days in a row. This is a sign of hypoglycemia or low blood sugar.  - If your blood glucose is higher than 250 mg/dl for 2 to 3 days. This is a sign of hyperglycemia or high blood sugar.      Endocrinology Outpatient follow up: Recommend follow up with your endocrinologist once discharged.  Please call the clinic at [phone] if you do not have an appointment scheduled on discharge, or if you have non-urgent questions regarding your blood sugars or insulin. Call the clinic if your BG is running greater than 180 consistently or less than 70 consistently.      If you have urgent questions or concerns regarding your blood sugars or insulin, you may contact [phone] (the main hospital ). Ask to speak with the endocrinologist on call.        How to treat a low blood sugar:  You may be experiencing hypoglycemia (low blood sugar) if:  BG less than 65 mg/dL or when feeling symptoms of hypoglycemia such as  Sweating  Shakiness/Tremors  Increased appetite  Nausea  Dizziness or light-headedness  Sleepiness  Weakness  Rapid heart rate  Headache  Tingling around mouth and tongue    If you are having a low blood sugar, do the following steps:  -Eat 15 grams simple Carbs (1/2 cup orange juice, 4 glucose tablets, 1/2 cup regular soda)  -Wait 15 minutes  -Test with your blood glucose meter again  -If your blood sugar is above 70, then continue normally  -If still less than 70,  repeat above process until over 70  -Call clinic for recurrent low blood glucose (more than 2 a week or 2 a day)        Test Claims: ordered 11/20  Education: Ordered 11/20. Fixed dose vs carb counting.   Outpatient Follow-up: recommend MFM and Dietician.      Thank you for this consult. IDS will continue to follow.      Please notify Inpatient Diabetes Service if changes are planned to steroids, nutrition, TPN/TF and anticipated procedures requiring prolonged NPO status.     CATALINO Willis CNP    To contact Inpatient Diabetes Service:     7 AM - 5 PM: Page the IDS LORELEI following the patient that day (see filed or incomplete progress notes/consult notes under Endocrinology)    OR if uncertain of provider assignment: page job code [phone]    5 PM - 7 AM: First call after hours is to primary service.    For urgent after-hours questions, page job code for on call fellow: [phone]     I spent a total of 80 minutes on the date of the encounter doing prep/post-work, chart review, history and exam, documentation and further activities per the note including lab review, multidisciplinary communication, counseling the patient and/or coordinating care regarding acute hyper/hypoglycemic management, as well as discharge management and planning/communication.  See note for details.

## 2024-11-20 NOTE — PROGRESS NOTES
Maternal-Fetal Medicine   Antepartum Progress Note    Subjective   Feeling okay this morning. She had a headache overnight which she thinks might be related to being tired, but this has gone away entirely this morning, and it was never particularly strong. She does get headaches at baseline. Otherwise no vision changes, chest pain, shortness of breath, or RUQ/epigastric pain. No contractions, loss of fluid, vaginal bleeding, or changes in movement.    Objective     Vitals:    11/19/24 1934 11/19/24 2246 11/20/24 0205 11/20/24 0713   BP: (!) 154/96 (!) 140/87 (!) 135/96 134/87   BP Location: Right arm Right arm Right arm Right arm   Patient Position: Semi-Acevedo's Semi-Acevedo's Semi-Acevedo's Semi-Acevedo's   Cuff Size: Adult Regular Adult Regular Adult Regular Adult Regular   Pulse:    78   Resp: 16 16 16 18   Temp: 98  F (36.7  C) 98.2  F (36.8  C)  98.2  F (36.8  C)   TempSrc: Oral Oral  Oral   Weight:       Height:           I/O last 3 completed shifts:  In: 1600 [P.O.:1600]  Out: 580 [Urine:580]    Gen: Resting comfortably in bed, NAD  CV: Regular rate, appears well perfused.   Resp: Breathing comfortably on room air   Abd: Gravid, non-tender, non-distended  Ext: non-tender, no edema    FHT: 130 bpm, moderate variability, 15x15 accelerations present, rare variable decelerations  Dedham: flat, one possible contraction  Impression: reactive and reassuring for gestational age    Labs:   Latest Reference Range & Units 11/20/24 07:02   Sodium 135 - 145 mmol/L 136   Potassium 3.4 - 5.3 mmol/L 4.1   Chloride 98 - 107 mmol/L 106   Carbon Dioxide (CO2) 22 - 29 mmol/L 18 (L)   Urea Nitrogen 6.0 - 20.0 mg/dL 14.5   Creatinine 0.51 - 0.95 mg/dL 0.69   GFR Estimate >60 mL/min/1.73m2 >90   Calcium 8.8 - 10.4 mg/dL 8.9   Anion Gap 7 - 15 mmol/L 12   Albumin 3.5 - 5.2 g/dL 3.0 (L)   Protein Total 6.4 - 8.3 g/dL 6.1 (L)   Alkaline Phosphatase 40 - 150 U/L 114   ALT 0 - 50 U/L 19   AST 0 - 45 U/L 16   Bilirubin Total <=1.2 mg/dL  <0.2   Glucose 70 - 99 mg/dL 129 (H)   WBC 4.0 - 11.0 10e3/uL 11.9 (H)   Hemoglobin 11.7 - 15.7 g/dL 11.5 (L)   Hematocrit 35.0 - 47.0 % 33.2 (L)   Platelet Count 150 - 450 10e3/uL 208   RBC Count 3.80 - 5.20 10e6/uL 3.80   MCV 78 - 100 fL 87   MCH 26.5 - 33.0 pg 30.3   MCHC 31.5 - 36.5 g/dL 34.6   RDW 10.0 - 15.0 % 14.0   (L): Data is abnormally low  (H): Data is abnormally high     Latest Reference Range & Units 24 06:00 24 09:13 24 10:25 24 13:31 24 15:01 24 18:58 24 20:17 24 22:05 24 02:07 24 07:19   GLUCOSE BY METER POCT 70 - 99 mg/dL 119 (H) 109 (H) 149 (H) 117 (H) 131 (H) 141 (H) 142 (H) 148 (H) 132 (H) 130 (H)   (H): Data is abnormally high    Imaging:  Narrative & Impression           Comp Follow Up  ---------------------------------------------------------------------------------------------------------  Pat. Name: RADHA TRAMMELL       Study Date:  2024 7:09am  Pat. NO:  [id number]        Referring  MD: DYLAN MORGAN  Site:         Sonographer: Nicole Garcia RDMS  :  1980        Age:   44  ---------------------------------------------------------------------------------------------------------     INDICATION  ---------------------------------------------------------------------------------------------------------  Fetal Growth Restriction  Advanced Maternal Age  GDMA2 on Insulin  Increased BMI  Uterine Fibroids  Hypothyroidism  gHTN        METHOD  ---------------------------------------------------------------------------------------------------------  [facility] ANTEPARTUM inpatient exam. Transabdominal ultrasound examination. View: Sufficient        PREGNANCY  ---------------------------------------------------------------------------------------------------------  Monge pregnancy. Number of fetuses: 1        DATING  ---------------------------------------------------------------------------------------------------------                                            Date                                Details                                                                                      Gest. age                      JARED  LMP                                  4/4/2024                                                                                                                           32 w + 6 d                     1/9/2025  Previous U/S                      5/30/2024                        GA, GA 7 w + 6 d                                                                       32 w + 5 d                     1/10/2025  U/S                                   11/20/2024                       based upon AC, BPD, Femur, HC                                                 29 w + 5 d                     1/31/2025  Assigned dating                  based on the LMP, selected on 10/30/2024                                                                         32 w + 6 d                     1/9/2025        GENERAL EVALUATION  ---------------------------------------------------------------------------------------------------------  Cardiac activity present.  bpm. Fetal movements: present. Presentation: cephalic  Placenta: Right lateral, No Previa, > 2 cm from internal os.  Umbilical cord: 3 vessel cord  Amniotic fluid: Amount of AF: normal. MVP 5.6 cm        FETAL BIOMETRY  ---------------------------------------------------------------------------------------------------------  BPD                                                         70.5                    mm                         28w 2d                                                Hadlock  OFD                                                         99.0                    mm                         29w 2d                                                Nicolaides  HC                                                           272.2                  mm                         29w 5d                                                  Hadlock  AC                                                           253.3                  mm                         29w 4d                      <1%                    Hadlock  Femur                                                      59.6                    mm                         31w 0d                                                 Hadlock  Fetal Weight Calculation:  EFW                                                        1,483                  g                                                             1%                      Hadlock  EFW (lb,oz)                                              3 lb 4                  oz  EFW by                                                     Hadlock (BPD--AC-FL)  Head / Face / Neck Biometry:                                                              2.1                     mm        FETAL ANATOMY  ---------------------------------------------------------------------------------------------------------  The following structures appear normal:  Head / Neck                         Cranium. Head size. Head shape. Lateral ventricles. Midline falx. Cavum septi pellucidi. Thalami.  Face                                   Lips. Profile. Nose.  Heart / Thorax                      4-chamber view. RVOT view. LVOT view. 3-vessel-trachea view. Aortic arch view.                                             Diaphragm.  Abdomen                             Stomach. Bladder.  Spine                                  Cervical spine. Thoracic spine. Lumbar spine.  Extremities / Skeleton          Right hand. Left arm. Left hand. Left foot.     The following structures could not be adequately visualized:  Heart / Thorax                      Ductal arch view.  Abdomen                             Abdom. wall. Cord insertion.  Spine                                  Sacral spine.     The following structures were documented previously:  Head /  Neck                         Cerebellum. Cisterna magna.  Abdomen                             Kidneys.     Fetal sex: female.        BIOPHYSICAL PROFILE  ---------------------------------------------------------------------------------------------------------  0: Fetal breathing movements  2: Gross body movements  2: Fetal tone  2: Amniotic fluid volume  NST: reactive  8/10 Biophysical profile score  Interpretation: equivocal        FETAL DOPPLER  ---------------------------------------------------------------------------------------------------------  Umbilical Artery: normal. Sampling site: midcord  PI                                                             0.76                                                                                 20%                     Omar  HR                                                           134                     bpm        MATERNAL STRUCTURES  ---------------------------------------------------------------------------------------------------------  Uterus                                 Fibroid(s) Size 132 mm x 91 mm x 100 mm. Mean 107.5 mm. Vol 626.470 cmÂ . Left lateral wall  Cervix                                  Suboptimal  Right Ovary                          Not examined  Left Ovary                            Not examined        NON STRESS TEST  ---------------------------------------------------------------------------------------------------------  NST interpretation: reactive. Test duration 20 min. Baseline  bpm. Baseline variability: moderate. Accelerations: present. Decelerations: absent. Uterine activity:  absent. CTG category: normal/reassuring        RECOMMENDATION  ---------------------------------------------------------------------------------------------------------  Patient is currently admitted inpatient to [facility] for gestational hypertension with worsening BP. Growth US and  surveillance was performed in the setting of  fetal  growth restriction, GDMA2, and gestational hypertension.     I discussed the findings on today's ultrasound with the patient.     Continue twice weekly  surveillance alternating UA Dopplers/NST and BPP. While admitted she is undergoing twice daily NST. For FGR with concurrent maternal  condition, delivery is recommended at [id number] in the setting of reassuring  surveillance and UA Dopplers. Given her comorbidities, severe FGR, and interval  growth of 287 grams in 3 weeks, anticipate delivery in the earlier part of this window. Continue close monitoring for development of pre-eclampsia with SF.     **Fetal anomalies may be present but not detected**                                                                         IMPRESSION  ---------------------------------------------------------------------------------------------------------  1. Monge pregnancy at 32w 6d with fetal growth restriction (EFW 5% on 10/30/24).  2. None of the anomalies commonly detected by ultrasound were evident in the limited fetal anatomic survey as described above however some views were suboptimal due  to late gestational age.  3. EFW was at the 1%ile on today?s ultrasound with AC <1%. Appropriate interval growth was not noted (287 grams in 3 weeks).  4. The amniotic fluid volume appeared normal.  5. The umbilical artery Dopplers were normal.  6. BPP 8/10 (-2 for breathing) however the fetal heart rate monitoring was appropriate for gestational age.       Assessment/Plan   Ravi Peñaloza is a 44 year old  @ 32w6d by 7w6d US, on HD #3 for rule out preeclampsia w/SF in the setting of increasing mild range blood pressures found in the [facility] office. Given AMA and blood pressure trajectory (just met criteria for gHTN last week, now having more persistent elevated blood pressures), there is concern that she may be trending toward SR BP. Growth restriction has now progressed to severe fetal growth restriction  with EFW 1% and AC <1%. She was admitted for  steroids and serial blood pressure monitoring. Now working on monitoring blood sugars through betamethasone window with blood sugars persistently above goal despite increased insulin dosing.    # Gestational hypertension   # R/o preeclampsia   - Serial BP monitoring, Q4hr BP checks    - BPs: Normotensive to low mild range on admission, now with multiple high mild range blood pressures throughout stay.   - Antihypertensives: IV antihypertensives PRN for sustained severe range blood pressures (SBP>160, DBP>110 sustained over 15 minutes)  - Labs: HELLP labs, UPC 0.17 on ; repeat today () WNL and stable from prior values.  - Daily weights, strict I&Os  - No subjective signs/symptoms preeclampsia  - Delivery [id number]. Anticipate delivery in the early part of this window given severe fetal growth restriction in setting of maternal comorbidity.  - NICU consult PRN if imminent delivery or outpatient as indicated.  - continue PTA aspirin 81 mg    # Gestational Diabetes A2    Pre admission regimen NPH 8 units at bedtime. Reports fastings within goal at home. She does not follow with Endocrinology. Has had consistently elevated blood sugars above goal while inpatient despite adjustment of insulin in setting of betamethasone per typically protocol (25%, then 40% increases initially, then back to prior to admission dosing).  - PTA NPH 8u held. S/p 10u  PM, 12u  PM.  - Endocrine consult today given consistently elevated blood glucoses despite adjustments. Anticipate possible meal coverage.  - Medium sliding scale insulin   - Blood Glucose Checks: Fasting, TID AC, PP, at bedtime   - Insulin gtt PRN + D5 NS PRN for labile sugars in labor PRN    # Severe Fetal Growth Restriction  Known growth restriction prior to this hospitalization. Now with less than anticipated interval growth since last US 10/30.  - Nahum 10/30 EFW 5% (1196g), AC 3%  - Nahum  EFW 1%  (1483g), AC <1%. UA dopplers with normal flow.  - Given new finding of severe fetal growth restriction in the setting of maternal comorbidites (gestational hypertension and gestational diabetes), discussed ACOG recommendations for delivery between [id number]. Discussed that, given overall trend and blood pressures, anticipate delivery will be closer to 34w0d, but that Dopplers are reassuring at this time.     # Hypothyroidism   - PTA levothyroxine 112mcg PO qdaily, dose and a half (168mcg PO) given on  per her endocrinologist's recommendation for increased dose once weekly.  - TSH 0.823 10/20/24      # PNC  - Rh positive, Rubella immune  - GBS pending  - Other prenatal labs wnl  - Imaging: placenta   - S/p TDaP ; flu, COVID declined                    # FWB:   # Fetal growth restriction   - TID NST  - s/p BMZ for fetal lung maturity (-)  - NICU for delivery PRN - will work to schedule NICU consult outpatient given anticipated  delivery with growth restriction.  - Intrauterine resuscitative measures PRN  - Decreased interval growth on ultrasound, now with EFW 1% (1483g), AC <1% (see above)  - BPP 8/10 today (points off for breathing)     # PPH Risk:  - Medium, 12cm fibroid (no prior uterine incisions, sarmiento, <5 prior vaginal deliveries, no h/o pph or bleeding disorder), Type and screen ordered     Clinically Significant Risk Factors               # Hypoalbuminemia: Lowest albumin = 3 g/dL at 2024  7:02 AM, will monitor as appropriate     # Hypertension: Noted on problem list                     Medically Ready for Discharge: Anticipated Tomorrow pending blood glucose control    Shlomo Sheriff MD  OB/GYN PGY-3  2024 8:21 AM    MFM Attending Attestation  I saw this patient with the resident and agree with the resident's findings and plan of care as documented in the resident's note. I personally reviewed her vital signs, medications, labs, pertinent imaging, and fetal monitoring.  "In summary, Ms. Peñaloza is a 44 year old  at 32w6d admitted for increasing BP in the setting of recent gestational hypertension diagnosis. Also with GDMA2 and now severe FGR. Given her BP at high mild range, she was initiated on course of BMTZ. Will continue serial BP monitoring and adjustment of insulin for her GDMA2. BPP today 8/10 off for fetal breathing. If NST not reactive at any point today will repeat in AM. She is currently asymptomatic, labs stable.    /86 (BP Location: Right arm, Patient Position: Semi-Acevedo's, Cuff Size: Adult Regular)   Pulse 82   Temp 98.6  F (37  C) (Oral)   Resp 20   Ht 1.549 m (5' 1\")   Wt 88.2 kg (194 lb 8 oz)   LMP 2024 (Exact Date)   BMI 36.75 kg/m          Time Spent on this Encounter   I spent a total of 45 minutes face-to-face or coordinating care of Ms. Peñaloza. Over 50% of my time on the unit was spent counseling the patient and /or coordinating care regarding diagnosis, diagnostic results, prognosis and risks and benefits of treatment options.     Date of service (when I saw the patient): 2024     Claudette Le MD   , OB/GYN  Maternal-Fetal Medicine    "

## 2024-11-20 NOTE — PLAN OF CARE
Pt rested comfortably overnight. VSS. Afebrile. Denies pre-E symptoms. Denies any LOF, bleeding, ctx, pain. FHR and toco monitoring charted- see flowsheets. Continue with current plan of care. Pt aware to call nursing with any questions or concerns. Call light in reach.

## 2024-11-20 NOTE — CONSULTS
11/20  Test claim for Novolog- covered medication $78 (Novolog pen)- 30 days supply.     Thank you for allowing me to help with your patient  [facility]na [facility]  Pharmacy Discharge Liaison   [facility]    Disclaimer: Pharmacy test claims are estimates and may not reflect final costs. Suggested alternatives aim to be cost-effective but may not be therapeutically equivalent as this consult is informational and does not constitute medical advice. Clinical decisions should be made by qualified healthcare providers.

## 2024-11-21 ENCOUNTER — APPOINTMENT (OUTPATIENT)
Dept: ULTRASOUND IMAGING | Facility: CLINIC | Age: 44
DRG: 832 | End: 2024-11-21
Payer: COMMERCIAL

## 2024-11-21 ENCOUNTER — HOSPITAL ENCOUNTER (INPATIENT)
Facility: CLINIC | Age: 44
End: 2024-11-21
Admitting: STUDENT IN AN ORGANIZED HEALTH CARE EDUCATION/TRAINING PROGRAM
Payer: COMMERCIAL

## 2024-11-21 VITALS
BODY MASS INDEX: 36.55 KG/M2 | WEIGHT: 193.6 LBS | SYSTOLIC BLOOD PRESSURE: 145 MMHG | HEART RATE: 82 BPM | HEIGHT: 61 IN | TEMPERATURE: 97.6 F | DIASTOLIC BLOOD PRESSURE: 91 MMHG | RESPIRATION RATE: 17 BRPM

## 2024-11-21 LAB
ALBUMIN SERPL BCG-MCNC: 3.1 G/DL (ref 3.5–5.2)
ALP SERPL-CCNC: 111 U/L (ref 40–150)
ALT SERPL W P-5'-P-CCNC: 24 U/L (ref 0–50)
ANION GAP SERPL CALCULATED.3IONS-SCNC: 12 MMOL/L (ref 7–15)
AST SERPL W P-5'-P-CCNC: 22 U/L (ref 0–45)
BILIRUB SERPL-MCNC: <0.2 MG/DL
BUN SERPL-MCNC: 17.6 MG/DL (ref 6–20)
CALCIUM SERPL-MCNC: 8.7 MG/DL (ref 8.8–10.4)
CHLORIDE SERPL-SCNC: 107 MMOL/L (ref 98–107)
CREAT SERPL-MCNC: 0.7 MG/DL (ref 0.51–0.95)
EGFRCR SERPLBLD CKD-EPI 2021: >90 ML/MIN/1.73M2
ERYTHROCYTE [DISTWIDTH] IN BLOOD BY AUTOMATED COUNT: 14.3 % (ref 10–15)
GLUCOSE BLDC GLUCOMTR-MCNC: 104 MG/DL (ref 70–99)
GLUCOSE BLDC GLUCOMTR-MCNC: 111 MG/DL (ref 70–99)
GLUCOSE BLDC GLUCOMTR-MCNC: 126 MG/DL (ref 70–99)
GLUCOSE BLDC GLUCOMTR-MCNC: 94 MG/DL (ref 70–99)
GLUCOSE BLDC GLUCOMTR-MCNC: 98 MG/DL (ref 70–99)
GLUCOSE SERPL-MCNC: 92 MG/DL (ref 70–99)
HCO3 SERPL-SCNC: 17 MMOL/L (ref 22–29)
HCT VFR BLD AUTO: 32.6 % (ref 35–47)
HGB BLD-MCNC: 11 G/DL (ref 11.7–15.7)
MCH RBC QN AUTO: 29.5 PG (ref 26.5–33)
MCHC RBC AUTO-ENTMCNC: 33.7 G/DL (ref 31.5–36.5)
MCV RBC AUTO: 87 FL (ref 78–100)
PLATELET # BLD AUTO: 225 10E3/UL (ref 150–450)
POTASSIUM SERPL-SCNC: 4.3 MMOL/L (ref 3.4–5.3)
PROT SERPL-MCNC: 6.2 G/DL (ref 6.4–8.3)
RBC # BLD AUTO: 3.73 10E6/UL (ref 3.8–5.2)
SODIUM SERPL-SCNC: 136 MMOL/L (ref 135–145)
WBC # BLD AUTO: 11.3 10E3/UL (ref 4–11)

## 2024-11-21 PROCEDURE — 85041 AUTOMATED RBC COUNT: CPT

## 2024-11-21 PROCEDURE — 84075 ASSAY ALKALINE PHOSPHATASE: CPT

## 2024-11-21 PROCEDURE — 76818 FETAL BIOPHYS PROFILE W/NST: CPT | Mod: 26 | Performed by: STUDENT IN AN ORGANIZED HEALTH CARE EDUCATION/TRAINING PROGRAM

## 2024-11-21 PROCEDURE — 76816 OB US FOLLOW-UP PER FETUS: CPT | Mod: 26 | Performed by: STUDENT IN AN ORGANIZED HEALTH CARE EDUCATION/TRAINING PROGRAM

## 2024-11-21 PROCEDURE — 99232 SBSQ HOSP IP/OBS MODERATE 35: CPT | Performed by: NURSE PRACTITIONER

## 2024-11-21 PROCEDURE — 250N000013 HC RX MED GY IP 250 OP 250 PS 637

## 2024-11-21 PROCEDURE — 250N000012 HC RX MED GY IP 250 OP 636 PS 637: Performed by: NURSE PRACTITIONER

## 2024-11-21 PROCEDURE — 82310 ASSAY OF CALCIUM: CPT

## 2024-11-21 PROCEDURE — 80053 COMPREHEN METABOLIC PANEL: CPT

## 2024-11-21 PROCEDURE — 84155 ASSAY OF PROTEIN SERUM: CPT

## 2024-11-21 PROCEDURE — 76819 FETAL BIOPHYS PROFIL W/O NST: CPT

## 2024-11-21 PROCEDURE — 85014 HEMATOCRIT: CPT

## 2024-11-21 PROCEDURE — 36415 COLL VENOUS BLD VENIPUNCTURE: CPT

## 2024-11-21 PROCEDURE — 99239 HOSP IP/OBS DSCHRG MGMT >30: CPT | Mod: 25 | Performed by: STUDENT IN AN ORGANIZED HEALTH CARE EDUCATION/TRAINING PROGRAM

## 2024-11-21 RX ADMIN — INSULIN ASPART 1 UNITS: 100 INJECTION, SOLUTION INTRAVENOUS; SUBCUTANEOUS at 02:21

## 2024-11-21 RX ADMIN — Medication 112 MCG: at 06:25

## 2024-11-21 RX ADMIN — PRENATAL VIT W/ FE FUMARATE-FA TAB 27-0.8 MG 1 TABLET: 27-0.8 TAB at 12:13

## 2024-11-21 ASSESSMENT — ACTIVITIES OF DAILY LIVING (ADL)
ADLS_ACUITY_SCORE: 0

## 2024-11-21 NOTE — PROGRESS NOTES
Maternal-Fetal Medicine   Antepartum Progress Note    Subjective   Ravi is feeling well this morning.  She denies headache, vision changes, chest pain, shortness of breath, leg swelling, or RUQ/epigastric pain. Denies contractions, loss of fluid, vaginal bleeding, or changes in movement. Overall feels well for discharge today and understands plan with her insulin.    Objective     Vitals:    11/20/24 2237 11/21/24 0215 11/21/24 0748 11/21/24 1213   BP: 139/79 134/78 135/85 (!) 145/91   BP Location: Right arm      Patient Position: Semi-Acevedo's      Cuff Size: Adult Regular      Pulse:       Resp:  18 18 17   Temp: 97.9  F (36.6  C)  97.6  F (36.4  C)    TempSrc: Oral  Oral    Weight:   87.8 kg (193 lb 9.6 oz)    Height:           I/O last 3 completed shifts:  In: 3500 [P.O.:3500]  Out: 4370 [Urine:4370]    Gen: Resting comfortably in bed, NAD  CV: Regular rate, appears well perfused.   Resp: Breathing comfortably on room air   Abd: Gravid, non-tender, non-distended  Ext: non-tender, no edema    FHT: 135 bpm, moderate variability, only 1 15x15 acceleration present otherwise multiple 10x10 accelerations, no decelerations  [place]: flat  Impression: not reactive, reassuring for gestational age, BPP 8/10    Labs:   Latest Reference Range & Units 11/21/24 07:37   Sodium 135 - 145 mmol/L 136   Potassium 3.4 - 5.3 mmol/L 4.3   Chloride 98 - 107 mmol/L 107   Carbon Dioxide (CO2) 22 - 29 mmol/L 17 (L)   Urea Nitrogen 6.0 - 20.0 mg/dL 17.6   Creatinine 0.51 - 0.95 mg/dL 0.70   GFR Estimate >60 mL/min/1.73m2 >90   Calcium 8.8 - 10.4 mg/dL 8.7 (L)   Anion Gap 7 - 15 mmol/L 12   Albumin 3.5 - 5.2 g/dL 3.1 (L)   Protein Total 6.4 - 8.3 g/dL 6.2 (L)   Alkaline Phosphatase 40 - 150 U/L 111   ALT 0 - 50 U/L 24   AST 0 - 45 U/L 22   Bilirubin Total <=1.2 mg/dL <0.2   Glucose 70 - 99 mg/dL 92   WBC 4.0 - 11.0 10e3/uL 11.3 (H)   Hemoglobin 11.7 - 15.7 g/dL 11.0 (L)   Hematocrit 35.0 - 47.0 % 32.6 (L)   Platelet Count 150 - 450  10e3/uL 225   RBC Count 3.80 - 5.20 10e6/uL 3.73 (L)   MCV 78 - 100 fL 87   MCH 26.5 - 33.0 pg 29.5   MCHC 31.5 - 36.5 g/dL 33.7   RDW 10.0 - 15.0 % 14.3   (L): Data is abnormally low  (H): Data is abnormally high    Glucose by meter                 Component  Ref Range & Units  9:00 AM  8:00 AM  2:12 AM 1 d ago 1 d ago 1 d ago 1 d ago    GLUCOSE BY METER POCT  70 - 99 mg/dL 104 High  94  High  111 High  124 High  122 High  111 High           Imaging 11/21/24 US:  INDICATION  ---------------------------------------------------------------------------------------------------------  Fetal Growth Restriction  Advanced Maternal Age  GDMA2 on Insulin  Increased BMI  Uterine Fibroids  Hypothyroidism  gHTN        METHOD  ---------------------------------------------------------------------------------------------------------  [facility] ANTEPARTUM inpatient exam, Transabdominal ultrasound examination        PREGNANCY  ---------------------------------------------------------------------------------------------------------  Monge pregnancy. Number of fetuses: 1        DATING  ---------------------------------------------------------------------------------------------------------                                           Date                                Details                                                                                      Gest. age                      JARED  LMP                                  4/4/2024                                                                                                                           33 w + 0 d                     1/9/2025  Previous U/S                      5/30/2024                        GA, GA 7 w + 6 d                                                                       32 w + 6 d                     1/10/2025  Assigned dating                  based on the LMP, selected on 10/30/2024                                                                          33 w + 0 d                     2025        GENERAL EVALUATION  ---------------------------------------------------------------------------------------------------------  Cardiac activity present.  bpm. Fetal movements: visualized. Presentation: tranverse with head to maternal left.  Placenta: Right lateral, No Previa, > 2 cm from internal os.  Umbilical cord: previously studied        AMNIOTIC FLUID ASSESSMENT  ---------------------------------------------------------------------------------------------------------  Amount of AF: normal  MVP 3.6 cm        BIOPHYSICAL PROFILE  ---------------------------------------------------------------------------------------------------------  2: Fetal breathing movements  2: Gross body movements  2: Fetal tone  2: Amniotic fluid volume  NST: non-reactive  8/10 Biophysical profile score        NON STRESS TEST  ---------------------------------------------------------------------------------------------------------  NST interpretation: non-reactive. Test duration 20 min. Baseline  bpm. Baseline variability: moderate. Accelerations: absent, only 1 15x15 acceleration, multiple  10x10 accelerations. Decelerations: absent. Uterine activity: absent. CTG category: normal/reassuring       RECOMMENDATION  ---------------------------------------------------------------------------------------------------------  Patient is currently admitted inpatient to [facility] for gestational hypertension with worsening BP.  surveillance was performed in the setting of fetal growth  restriction, GDMA2, and gestational hypertension.     I discussed the findings on today's ultrasound with the patient.     Continue twice weekly  surveillance alternating UA Dopplers/NST and BPP. While admitted she is undergoing twice daily NST. For FGR with concurrent maternal  condition, delivery is recommended at [id number]-63j6k in the setting of reassuring  surveillance and UA  Dopplers. Given her comorbidities, severe FGR, and interval  growth of 287 grams in 3 weeks, anticipate delivery in the earlier part of this window. Continue close monitoring for development of pre-eclampsia with SF.     Anticipate discharge today, thus will schedule outpatient  surveillance tomorrow in addition to twice weekly next week.     **Fetal anomalies may be present but not detected**                                                                      IMPRESSION  ---------------------------------------------------------------------------------------------------------  1. Monge pregnancy at 33w 0d gestational age.  2. The amniotic fluid volume appeared normal.  3. The BPP was 8/10 (non reactive NST).  4. Transverse presentation.    Assessment/Plan   Ravi Peñaloza is a 44 year old  @ 33w0d by 7w6d US, on HD #4 for rule out preeclampsia w/SF in the setting of increasing mild range blood pressures found in the [facility] office. Given AMA and blood pressure trajectory (just met criteria for gHTN last week, on admission having more persistent elevated blood pressures), there is concern that she may be trending toward developing SR BP. Growth restriction has now progressed to severe fetal growth restriction with EFW 1% and AC <1%. She was admitted for  steroids and serial blood pressure monitoring. We have been monitoring blood sugars through betamethasone window with insulin adjustment. Given no SR BP, reassuring BPP today of 8/10, improved glucose values, and patient without signs/symptoms of pre-eclampsia, will discharge with strict return precautions and plan for outpatient  surveillance tomorrow.    # Gestational hypertension   # R/o preeclampsia   - Serial BP monitoring, Q4hr BP checks    - BPs: Normotensive to low mild range   - Antihypertensives: IV antihypertensives PRN for sustained severe range blood pressures (SBP>160, DBP>110 sustained over 15 minutes)  - Labs: HELLP  labs, UPC 0.17 on 11/18; Labs (11/20), (11/21) WNL and stable from prior values.  - Daily weights, strict I&Os  - No subjective signs/symptoms of pre-eclampsia  - Delivery [id number]. Anticipate delivery in the early part of this window given severe fetal growth restriction in setting of maternal comorbidity.  - continue PTA aspirin 81 mg  - Discussed home blood pressure monitoring at least twice a day after discharge.    # Gestational Diabetes A2    - PTA NPH 8u held. S/p 10u 11/18 PM, 12u 11/19 PM, 14u 11/20 PM  - S/p Endocrine consult 11/20 given consistently elevated blood glucoses despite adjustments; initiated novolog 1 unit per 15 grams CHO with meals and medium correction  - Blood Glucose Checks: Fasting, TID AC, PP, at bedtime   - Insulin gtt PRN + D5 NS PRN for labile sugars in labor PRN  - Plan to transition back to PTA NPH 8u at home with mealtime coverage, will discuss with diabetes prior to discharge    # Severe Fetal Growth Restriction  Known growth restriction prior to this hospitalization. Now with less than anticipated interval growth since last US 10/30.  - N[place]um 10/30 EFW 5% (1196g), AC 3%  - N[place]um 11/20 EFW 1% (1483g), AC <1%. UA dopplers with normal flow.  - Given new finding of severe fetal growth restriction in the setting of maternal comorbidites (gestational hypertension and gestational diabetes), discussed ACOG recommendations for delivery between 15d1q-57u7w. Discussed that, given overall trend and blood pressures, anticipate delivery will be closer to 34w0d, but that Dopplers are reassuring at this time.     # Hypothyroidism   - PTA levothyroxine 112mcg PO qdaily, dose and a half (168mcg PO) given on 11/20 per her endocrinologist's recommendation for increased dose once weekly.  - TSH 0.823 10/20/24      # PNC  - Rh positive, Rubella immune  - GBS pending  - Other prenatal labs wnl  - Imaging: placenta   - S/p TDaP 11/5; flu, COVID declined                    # FWB:   # Fetal growth  restriction   - TID NST  - s/p BMZ for fetal lung maturity (-)  - NICU for delivery PRN - will work to schedule NICU consult outpatient given anticipated  delivery with growth restriction.  - Intrauterine resuscitative measures PRN  - Decreased interval growth on ultrasound, now with EFW 1% (1483g), AC <1% (see above)  - BPP 8/10 () (points off for breathing)  - Repeat BPP today 8/10  - Plan to schedule outpatient  surveillance tomorrow     # PPH Risk:  - Medium, 12cm fibroid (no prior uterine incisions, sarmiento, <5 prior vaginal deliveries, no h/o pph or bleeding disorder), Type and screen ordered     Clinically Significant Risk Factors               # Hypoalbuminemia: Lowest albumin = 3 g/dL at 2024  7:02 AM, will monitor as appropriate     # Hypertension: Noted on problem list                     Medically Ready for Discharge: Anticipated today pending blood glucose control    Dee Dee Orozco, MS4    This patient was seen and discussed with Dr. Shlomo Sheriff, PGY-3 and Dr. Claudette Le.     MFM Attending Attestation  I saw this patient with the resident and agree with the resident's findings and plan of care as documented in the resident's note. I personally reviewed her vital signs, medications, labs, pertinent imaging, and fetal monitoring. In summary, Ms. Peñaloza is a 44 year old  at 33w0d admitted for increasing BP in the setting of recent gestational hypertension diagnosis. Also with GDMA2 and now severe FGR. Given her BP at high mild range, she was initiated on course of BMTZ. BP throughout admission have remained normotensive to mild range, and glucose with now improved control. BPP today 8/10 off for non-reactive NST (only 1 15x15 acceleration). She is currently asymptomatic, labs stable. Given no SR BP, reassuring BPP today of 8/10, improved glucose values, and patient without signs/symptoms of pre-eclampsia, will discharge with strict return precautions and plan  "for outpatient  surveillance tomorrow.    BP (!) 145/91   Pulse 82   Temp 97.6  F (36.4  C) (Oral)   Resp 17   Ht 1.549 m (5' 1\")   Wt 87.8 kg (193 lb 9.6 oz)   LMP 2024 (Exact Date)   BMI 36.58 kg/m        Time Spent on this Encounter   I spent a total of 45 minutes face-to-face or coordinating care of Ms. Peñaloza. Over 50% of my time on the unit was spent counseling the patient and /or coordinating care regarding diagnosis, diagnostic results, prognosis and risks and benefits of treatment options.     Date of service (when I saw the patient): 2024     Claudette Le MD   , OB/GYN  Maternal-Fetal Medicine      " Quality 226: Preventive Care And Screening: Tobacco Use: Screening And Cessation Intervention: Patient screened for tobacco use and is an ex/non-smoker Quality 130: Documentation Of Current Medications In The Medical Record: Current Medications Documented Detail Level: Generalized

## 2024-11-21 NOTE — PLAN OF CARE
Goal Outcome Evaluation:  Pt states is comfortable, denies pain, feeling ctx's, leaking or bleeding. Baby at night; monitoring with minimal variability at first then repositioning pt to the left side then baby with short moderate variability accels present no decels; then minimal variability. Dr Bass notified; now pt on extended monitoring. Cares explained to patient.

## 2024-11-21 NOTE — PROVIDER NOTIFICATION
11/21/24 0102   Provider Notification   Provider Name/Title Dr. Bass   Method of Notification In Department   Request Evaluate - Remote   Notification Reason Decels     Provider aware of variable decelerations x3 and 1 deceleration lasting 1 minute with the lowest point being 110. Plan for continue overnight continuous EFM and BPP in the morning.

## 2024-11-21 NOTE — DISCHARGE INSTRUCTIONS
Learning About When to Call Your Doctor During Pregnancy (After 20 Weeks)  Overview  It's common to have concerns about what might be a problem when you're pregnant. Most pregnancies don't have any serious problems. But it's still important to know when to call your doctor if you have certain symptoms or signs of labor.  These are general suggestions. Your doctor may give you some more information about when to call.  When to call your doctor (after 20 weeks)  Call 911  anytime you think you may need emergency care. For example, call if:  You have severe vaginal bleeding. This means you are soaking through a pad each hour for 2 or more hours.  You have sudden, severe pain in your belly.  You have chest pain, are short of breath, or cough up blood.  You passed out (lost consciousness).  You have a seizure.  You see or feel the umbilical cord.  You think you are about to deliver your baby and can't make it safely to the hospital or birthing center.  Call your doctor now or seek immediate medical care if:  You have vaginal bleeding.  You have belly pain.  You have a fever.  You are dizzy or lightheaded, or you feel like you may faint.  You have signs of a blood clot in your leg (called a deep vein thrombosis), such as:  Pain in the calf, back of the knee, thigh, or groin.  Swelling in your leg or groin.  A color change on the leg or groin. The skin may be reddish or purplish, depending on your usual skin color.  You have symptoms of preeclampsia, such as:  Sudden swelling of your face, hands, or feet.  New vision problems (such as dimness, blurring, or seeing spots).  A severe headache.  You have a sudden release of fluid from your vagina. (You think your water broke.)  You've been having regular contractions for an hour. This means that you've had at least 6 contractions within 1 hour, even after you change your position and drink fluids.  You notice that your baby has stopped moving or is moving less than  "normal.  You have signs of heart failure, such as:  New or increased shortness of breath.  New or worse swelling in your legs, ankles, or feet.  Sudden weight gain, such as more than 2 to 3 pounds in a day or 5 pounds in a week.  Feeling so tired or weak that you cannot do your usual activities.  You have symptoms of a urinary tract infection. These may include:  Pain or burning when you urinate.  A frequent need to urinate without being able to pass much urine.  Pain in the flank, which is just below the rib cage and above the waist on either side of the back.  Blood in your urine.  Watch closely for changes in your health, and be sure to contact your doctor if:  You have vaginal discharge that smells bad.  You feel sad, anxious, or hopeless for more than a few days.  You have skin changes, such as a rash, itching, or a yellow color to your skin.  You have other concerns about your pregnancy.  If you have labor signs at 37 weeks or more  If you have signs of labor at 37 weeks or more, your doctor may tell you to call when your labor becomes more active. Symptoms of active labor include:  Contractions that are regular.  Contractions that are less than 5 minutes apart.  Contractions that are hard to talk through.  Follow-up care is a key part of your treatment and safety. Be sure to make and go to all appointments, and call your doctor if you are having problems. It's also a good idea to know your test results and keep a list of the medicines you take.  Where can you learn more?  Go to https://www.[employer].net/patiented  Enter N531 in the search box to learn more about \"Learning About When to Call Your Doctor During Pregnancy (After 20 Weeks).\"  Current as of: July 10, 2023  Content Version: 14.2 2024 [facility].   Care instructions adapted under license by your healthcare professional. If you have questions about a medical condition or this instruction, always ask your healthcare professional. " [employer] disclaims any warranty or liability for your use of this information.    IP Diabetes Management Team Discharge Instructions     Glucose Control Regimen:  NPH 8 units daily. Give at the same time everyday at 8 pm.       Check your blood sugar before each meal and give Novolog correction scale, to reduce high blood sugar.      Correction Scale - MEDIUM INSULIN RESISTANCE DOSING     Do Not give Correction Insulin if Pre-Meal BG less than 100.   For  - 149 give 1 unit.   For  - 199 give 2 units.   For  - 249 give 3 units.   For  - 299 give 4 units.   For  - 349 give 5 units.   For  - 399 give 6 units.   For  - 449 give 7 units.   For BG greater than or equal to 450 give 8 units.        Check your blood sugar before bed at 9 pm and give Novolog correction scale, to reduce high blood sugar.     ISF 50  1 per 50 > 120   For  - 169 give 1 unit.   For  - 219 give 2 units.   For  - 269 give 3 units.   For  - 319 give 4 units.   For BG = or > 320 give 5 units.         Blood Glucose Checks: 3 three times daily before meals, and at bedtime.  BG targets  Fasting glucose 70-95 mg/dL AND  One-hour postprandial glucose 110-140 mg/dL   Two-hour postprandial glucose 100-120 mg/dL  Goal of 0 episodes of blood sugars less than 60      Endocrinology Outpatient follow up: Recommend follow up with your endocrinologist once discharged. Review your post prandials with your provider if you would need additional insulin coverage during the day.  Please call your clinic if you do not have an appointment scheduled on discharge, or if you have non-urgent questions regarding your blood sugars or insulin.      If you have urgent questions or concerns regarding your blood sugars or insulin, you may contact [phone] (the main hospital ). Ask to speak with the endocrinologist on call.     How to treat a low blood sugar:  You may be experiencing  hypoglycemia (low blood sugar) if:  BG less than 65 mg/dL or when feeling symptoms of hypoglycemia such as  Sweating  Shakiness/Tremors  Increased appetite  Nausea  Dizziness or light-headedness  Sleepiness  Weakness  Rapid heart rate  Headache  Tingling around mouth and tongue    If you are having a low blood sugar, do the following steps:  -Eat 15 grams simple Carbs (1/2 cup orange juice, 4 glucose tablets, 1/2 cup regular soda)  -Wait 15 minutes  -Test with your blood glucose meter again  -If your blood sugar is above 70, then continue normally  -If still less than 70, repeat above process until over 70  -Call clinic for recurrent low blood glucose (more than 2 a week or 2 a day)

## 2024-11-21 NOTE — PROGRESS NOTES
VSS. Patient denies LOF, VB, cramping or contractions, HA, vision changes, RUQ pain, chest pain, SOB and increased swelling. Endorses +FM. BG stable. BPP 8/8. Patient cleared for discharge today with follow up in clinic tomorrow. Patient is to continue to monitor BP at home BID. Endocrine saw patient prior to discharge to discuss diabetes management plan after discharge. See note for details. Discharge instructions reviewed with patient. IV removed. Questions answered. Pt discharged at 1520.

## 2024-11-21 NOTE — PLAN OF CARE
VSS. Afebrile. Denies s/s of Preeclampsia. Denies contractions/cramping, vaginal bleeding, and loss of fluid. Endorses active fetal movement. Continuous monitoring overnight. See flowsheets for EFM and TOCO documentation. BG checked and insulin given as ordered (see MAR). Pt able to make needs known, and educated to call RN with any change in condition. Call light in reach. Report given to Trang CARRERA RN

## 2024-11-21 NOTE — PROGRESS NOTES
Inpatient Diabetes Management Service: Daily Progress Note     HPI: Ravi Peñaloza is a 44 year old with Gestational Diabetes Mellitus   at 32w4d by 7w6d US, who presents for elevated blood pressures in the setting of gestational hypertension. Inpatient Diabetes Service consulted for increased BG in setting of Betamethasone, assistance with insulin plan for discharge.  .          Assessment/Plan:     Assessment:   Hx of Gestational Diabetes Mellitus, no known complications. (A1c 5.6  %)  Acute steroid hyperglycemia; bmz completed      Plan/Recommendations:    - Decrease NPH 8 units q 24 hrs at 2000 (PTA NPH dose)   - Decrease Novolog Meal Coverage: 1 unit per 20 grams CHO, TID AC and PRN with snacks/supplements  - Novolog OB Correction Scale: medium resistance (ISF: 50) TID AC >100 mg/dL  . Medium resistance (ISF:50)   HS / 0200 > 120 mg/dL  - BG Monitoring: TID AC / HS / 0200 and 1 hr PP to be used for information only   - Hypoglycemia protocol  - Carb counting protocol   - IDS to follow, first call after hours to Primary team.      Management of Diabetes in pregnancy:  BG targets  Fasting glucose 70-95 mg/dL AND  One-hour postprandial glucose 110-140 mg/dL   Two-hour postprandial glucose 100-120 mg/dL  Goal of 0 episodes of blood sugars less than 60      A1c goal in pregnancy: 6.5% or less     Discussion: Dex will wane  at 10 am. Will  reduce NPH to PTA dose.  Will reduce carb ratio to 1 per 20 starting with lunch. Patient will continue pre meal checks and give insulin as needed based on medium correction scale. Will not continue carb coverage at discharge as Beta is most likely waned by this time.      IP Diabetes Management Team Discharge Instructions     Glucose Control Regimen:  NPH 8 units daily. Give at the same time everyday at 8 pm.       Check your blood sugar before each meal and give Novolog correction scale, to reduce high blood sugar.      Correction Scale -  MEDIUM INSULIN RESISTANCE DOSING     Do Not give Correction Insulin if Pre-Meal BG less than 100.   For  - 149 give 1 unit.   For  - 199 give 2 units.   For  - 249 give 3 units.   For  - 299 give 4 units.   For  - 349 give 5 units.   For  - 399 give 6 units.   For  - 449 give 7 units.   For BG greater than or equal to 450 give 8 units.        Check your blood sugar before bed at 9 pm and give Novolog correction scale, to reduce high blood sugar.     ISF 50  1 per 50 > 120   For  - 169 give 1 unit.   For  - 219 give 2 units.   For  - 269 give 3 units.   For  - 319 give 4 units.   For BG = or > 320 give 5 units.         Blood Glucose Checks: 3 three times daily before meals, and at bedtime.  BG targets  Fasting glucose 70-95 mg/dL AND  One-hour postprandial glucose 110-140 mg/dL   Two-hour postprandial glucose 100-120 mg/dL  Goal of 0 episodes of blood sugars less than 60      Endocrinology Outpatient follow up: Recommend follow up with your endocrinologist once discharged. Review your post prandials with your provider if you would need additional insulin coverage.  Please call your clinic if you do not have an appointment scheduled on discharge, or if you have non-urgent questions regarding your blood sugars or insulin.      If you have urgent questions or concerns regarding your blood sugars or insulin, you may contact [phone] (the main hospital ). Ask to speak with the endocrinologist on call.     How to treat a low blood sugar:  You may be experiencing hypoglycemia (low blood sugar) if:  BG less than 65 mg/dL or when feeling symptoms of hypoglycemia such as  Sweating  Shakiness/Tremors  Increased appetite  Nausea  Dizziness or light-headedness  Sleepiness  Weakness  Rapid heart rate  Headache  Tingling around mouth and tongue    If you are having a low blood sugar, do the following steps:  -Eat 15 grams simple Carbs (1/2 cup orange  juice, 4 glucose tablets, 1/2 cup regular soda)  -Wait 15 minutes  -Test with your blood glucose meter again  -If your blood sugar is above 70, then continue normally  -If still less than 70, repeat above process until over 70  -Call clinic for recurrent low blood glucose (more than 2 a week or 2 a day)       Please notify Inpatient Diabetes Service if changes are planned to steroids, nutrition, TPN/TF and anticipated procedures requiring prolonged NPO status.         Interval History/Review of Systems :   The last 24 hours progress and nursing notes reviewed.  No events overnight/     Planned Procedures/Surgeries: none    Inpatient Glucose Control:       Recent Labs   Lab 11/21/24  0212 11/20/24  2205 11/20/24  2019 11/20/24  1902 11/20/24  1447 11/20/24  1127   * 111* 124* 122* 111* 146*             Medications Impacting Glycemia:   Steroids: Betamethasone 12 mg on 11/18 at 2000 and 12 mg on 11/19 at 2000.  D5W containing solutions/medications: none  Other medications impacting glucose: none        Nutrition:   Orders Placed This Encounter      Low Consistent Carb (45 g CHO per Meal) Diet    Supplements:  none  TF: none  TPN: none         Diabetes History: see full consult note for complete diabetes history   Diabetes Type and Duration: Diagnosed at 16 weeks pregnant. In October 2023, started on NPH 8 units due to elevated fasting blood sugars.   GAD65 antibody, zinc transporter 8 antibody, islet antibody, insulin antibody, and c-peptide  not available on epic search      PTA Medication Regimen: NPH 8 unit at 2000 every night.   Missing doses?: none  Historical Diabetes Medications: none     Glucose monitoring device/frequency/trends: Glucometer. 6 times a day. Pre and post prandial.   Hypoglycemia PTA: Has not experienced hypoglycemia.      Outpatient Diabetes Provider: Dr. Elli simpson (last seen 11/4/24  Formal Diabetes Education/Educator: [facility]atic[facility] in [place] and  Miranda Sher RD [facility]  [facility]      ------------------------        Physical Exam:   /78   Pulse 82   Temp 97.9  F (36.6  C) (Oral)   Resp 18   Ht 1.549 m (5' 1\")   Wt 88.2 kg (194 lb 8 oz)   LMP 04/04/2024 (Exact Date)   BMI 36.75 kg/m    General:  well appearing, in no acute distress.  HEENT:  NC/AT  Lungs:  unremarkable  ABD:  rounded, soft, non-tender  Skin:  warm and dry  MSK:   moving all extremities  Lymp:   Trace LE edema  Mental Status:  Alert and oriented x3  Psych:   Cooperative, good eye contact, full/appropriate affect           Data:   No results found for: \"A1C\"    ROUTINE IP LABS (Last four results)  BMP  Recent Labs   Lab 11/21/24 0212 11/20/24 2205 11/20/24 2019 11/20/24  1902 11/20/24 0719 11/20/24 0702 11/19/24 0913 11/19/24 0702 11/18/24 2055 11/18/24 1759 11/14/24  1531   NA  --   --   --   --   --  136  --  135  --  136 140   POTASSIUM  --   --   --   --   --  4.1  --  4.2  --  3.8 4.2   CHLORIDE  --   --   --   --   --  106  --  106  --  106 107   LUNA  --   --   --   --   --  8.9  --  9.6  --  9.0 9.2   CO2  --   --   --   --   --  18*  --  17*  --  18* 19*   BUN  --   --   --   --   --  14.5  --  11.1  --  9.2 11.0   CR  --   --   --   --   --  0.69  --  0.63  --  0.68 0.85   * 111* 124* 122*   < > 129*   < > 120*   < > 80 95    < > = values in this interval not displayed.     CBC  Recent Labs   Lab 11/20/24 0702 11/19/24 0702 11/18/24 1759 11/14/24  1531   WBC 11.9* 9.6 8.6 10.6   RBC 3.80 4.17 3.70* 3.98   HGB 11.5* 12.2 11.0* 11.9   HCT 33.2* 35.6 31.7* 34.7*   MCV 87 85 86 87   MCH 30.3 29.3 29.7 29.9   MCHC 34.6 34.3 34.7 34.3   RDW 14.0 13.7 13.9 13.9    238 216 248     INRNo lab results found in last 7 days.    Inpatient Diabetes Service will continue to follow, please don't hesitate to contact the team with any questions or concerns.     CATALINO Willis CNP    Plan discussed with patient, bedside RN, and primary team via this note.    To " contact Inpatient Diabetes Service:     7 AM - 5 PM: Page the IDS LORELEI following the patient that day (see filed or incomplete progress notes/consult notes under Endocrinology)    OR if uncertain of provider assignment: page job code [phone]    5 PM - 7 AM: First call after hours is to primary service.    For urgent after-hours questions, page job code for on call fellow: [phone]     I spent a total of 45 minutes on the date of the encounter doing prep/post-work, chart review, history and exam, documentation and further activities per the note including lab review, multidisciplinary communication, counseling the patient and/or coordinating care regarding acute hyper/hypoglycemic management, as well as discharge management and planning/communication.

## 2024-11-21 NOTE — PROGRESS NOTES
CLINICAL NUTRITION SERVICES    Reason for Assessment:  Nutrition education regarding carb counting     Diet History:  Pt reports no history of receiving education on carb counting in the past.     Nutrition Diagnosis:  Food- and nutrition-related knowledge deficit r/t no previous knowledge of carb counting AEB pt report of not having received carb counting nutrition education in the past.      Interventions:  Nutrition Education:Survival Information  1)  Provided verbal and written nutrition on diabetes meal planning and importance of consistent carbohydrate intake to optimize glycemic control.  2)  Provided verbal and written nutrition education on carb counting.  Related this insulin dosing.  Gave various examples.   3)  Handouts provided:  Carb Counting handout, Using Nutrition Labels: Carbohydrates. Provided room service list of foods which depicts the grams of carbohydrate and carb units in various food items.    Goals:   Patient will verbalize at least one food choice (along with the appropriate portion size) in each of the food groups that contain 1 carb unit.      Follow-up:    Patient to ask any further nutrition-related questions before discharge.  In addition, pt may request outpatient RD appointment.    Hui Sanford MS, RDN, LD  TCU/OB/Ortho Clinical Dietitian  Available via phone and Vocera  Phone: [phone]  Vocera: 4B OB Clinical Dietitian  Weekend/Holiday Vocera: Weekend Holiday Clinical Dietitian [Multi Site Groups]

## 2024-11-22 ENCOUNTER — APPOINTMENT (OUTPATIENT)
Dept: MATERNAL FETAL MEDICINE | Facility: CLINIC | Age: 44
End: 2024-11-22
Attending: OBSTETRICS & GYNECOLOGY
Payer: COMMERCIAL

## 2024-11-22 ENCOUNTER — HOSPITAL ENCOUNTER (INPATIENT)
Facility: CLINIC | Age: 44
End: 2024-11-22
Attending: STUDENT IN AN ORGANIZED HEALTH CARE EDUCATION/TRAINING PROGRAM | Admitting: OBSTETRICS & GYNECOLOGY
Payer: COMMERCIAL

## 2024-11-22 ENCOUNTER — HOSPITAL ENCOUNTER (OUTPATIENT)
Dept: ULTRASOUND IMAGING | Facility: CLINIC | Age: 44
Discharge: HOME | End: 2024-11-22
Attending: OBSTETRICS & GYNECOLOGY
Payer: COMMERCIAL

## 2024-11-22 DIAGNOSIS — O14.13 SEVERE PRE-ECLAMPSIA IN THIRD TRIMESTER: Primary | ICD-10-CM

## 2024-11-22 DIAGNOSIS — O36.5990 FETAL GROWTH RESTRICTION ANTEPARTUM: ICD-10-CM

## 2024-11-22 DIAGNOSIS — Z98.891 S/P C-SECTION: ICD-10-CM

## 2024-11-22 PROBLEM — O14.10 SEVERE PREECLAMPSIA: Status: ACTIVE | Noted: 2024-11-22

## 2024-11-22 LAB
ABO/RH(D): NORMAL
ALBUMIN MFR UR ELPH: 11.5 MG/DL
ALBUMIN SERPL BCG-MCNC: 3.1 G/DL (ref 3.5–5.2)
ALP SERPL-CCNC: 110 U/L (ref 40–150)
ALT SERPL W P-5'-P-CCNC: 28 U/L (ref 0–50)
ANION GAP SERPL CALCULATED.3IONS-SCNC: 11 MMOL/L (ref 7–15)
ANTIBODY SCREEN: NEGATIVE
AST SERPL W P-5'-P-CCNC: 25 U/L (ref 0–45)
BILIRUB SERPL-MCNC: <0.2 MG/DL
BUN SERPL-MCNC: 13.9 MG/DL (ref 6–20)
CALCIUM SERPL-MCNC: 8.8 MG/DL (ref 8.8–10.4)
CHLORIDE SERPL-SCNC: 107 MMOL/L (ref 98–107)
CREAT SERPL-MCNC: 0.63 MG/DL (ref 0.51–0.95)
CREAT UR-MCNC: 27.5 MG/DL
EGFRCR SERPLBLD CKD-EPI 2021: >90 ML/MIN/1.73M2
ERYTHROCYTE [DISTWIDTH] IN BLOOD BY AUTOMATED COUNT: 14.3 % (ref 10–15)
GLUCOSE BLDC GLUCOMTR-MCNC: 104 MG/DL (ref 70–99)
GLUCOSE BLDC GLUCOMTR-MCNC: 125 MG/DL (ref 70–99)
GLUCOSE BLDC GLUCOMTR-MCNC: 128 MG/DL (ref 70–99)
GLUCOSE BLDC GLUCOMTR-MCNC: 128 MG/DL (ref 70–99)
GLUCOSE BLDC GLUCOMTR-MCNC: 88 MG/DL (ref 70–99)
GLUCOSE SERPL-MCNC: 87 MG/DL (ref 70–99)
HCO3 SERPL-SCNC: 18 MMOL/L (ref 22–29)
HCT VFR BLD AUTO: 32.5 % (ref 35–47)
HGB BLD-MCNC: 10.9 G/DL (ref 11.7–15.7)
MCH RBC QN AUTO: 29.1 PG (ref 26.5–33)
MCHC RBC AUTO-ENTMCNC: 33.5 G/DL (ref 31.5–36.5)
MCV RBC AUTO: 87 FL (ref 78–100)
PLATELET # BLD AUTO: 202 10E3/UL (ref 150–450)
POTASSIUM SERPL-SCNC: 3.9 MMOL/L (ref 3.4–5.3)
PROT SERPL-MCNC: 6 G/DL (ref 6.4–8.3)
PROT/CREAT 24H UR: 0.42 MG/MG CR (ref 0–0.2)
RBC # BLD AUTO: 3.75 10E6/UL (ref 3.8–5.2)
SODIUM SERPL-SCNC: 136 MMOL/L (ref 135–145)
SPECIMEN EXPIRATION DATE: NORMAL
T PALLIDUM AB SER QL: NONREACTIVE
WBC # BLD AUTO: 11.7 10E3/UL (ref 4–11)

## 2024-11-22 PROCEDURE — 36415 COLL VENOUS BLD VENIPUNCTURE: CPT

## 2024-11-22 PROCEDURE — 99223 1ST HOSP IP/OBS HIGH 75: CPT | Mod: 25 | Performed by: OBSTETRICS & GYNECOLOGY

## 2024-11-22 PROCEDURE — 96365 THER/PROPH/DIAG IV INF INIT: CPT

## 2024-11-22 PROCEDURE — 250N000013 HC RX MED GY IP 250 OP 250 PS 637

## 2024-11-22 PROCEDURE — 76815 OB US LIMITED FETUS(S): CPT | Mod: 26 | Performed by: OBSTETRICS & GYNECOLOGY

## 2024-11-22 PROCEDURE — 80053 COMPREHEN METABOLIC PANEL: CPT

## 2024-11-22 PROCEDURE — 258N000003 HC RX IP 258 OP 636

## 2024-11-22 PROCEDURE — 82040 ASSAY OF SERUM ALBUMIN: CPT

## 2024-11-22 PROCEDURE — 250N000012 HC RX MED GY IP 250 OP 636 PS 637

## 2024-11-22 PROCEDURE — 86900 BLOOD TYPING SEROLOGIC ABO: CPT

## 2024-11-22 PROCEDURE — 76820 UMBILICAL ARTERY ECHO: CPT

## 2024-11-22 PROCEDURE — 76820 UMBILICAL ARTERY ECHO: CPT | Mod: 26 | Performed by: OBSTETRICS & GYNECOLOGY

## 2024-11-22 PROCEDURE — G0463 HOSPITAL OUTPT CLINIC VISIT: HCPCS | Mod: 25

## 2024-11-22 PROCEDURE — 99232 SBSQ HOSP IP/OBS MODERATE 35: CPT

## 2024-11-22 PROCEDURE — 86780 TREPONEMA PALLIDUM: CPT

## 2024-11-22 PROCEDURE — 85027 COMPLETE CBC AUTOMATED: CPT

## 2024-11-22 PROCEDURE — 84156 ASSAY OF PROTEIN URINE: CPT

## 2024-11-22 PROCEDURE — 76816 OB US FOLLOW-UP PER FETUS: CPT

## 2024-11-22 PROCEDURE — 96375 TX/PRO/DX INJ NEW DRUG ADDON: CPT

## 2024-11-22 PROCEDURE — 250N000011 HC RX IP 250 OP 636

## 2024-11-22 PROCEDURE — 120N000002 HC R&B MED SURG/OB UMMC

## 2024-11-22 PROCEDURE — 86850 RBC ANTIBODY SCREEN: CPT

## 2024-11-22 PROCEDURE — 76818 FETAL BIOPHYS PROFILE W/NST: CPT | Mod: 26 | Performed by: OBSTETRICS & GYNECOLOGY

## 2024-11-22 RX ORDER — NIFEDIPINE 30 MG/1
60 TABLET, EXTENDED RELEASE ORAL EVERY 24 HOURS
Status: DISCONTINUED | OUTPATIENT
Start: 2024-11-23 | End: 2024-11-23

## 2024-11-22 RX ORDER — METHOCARBAMOL 500 MG/1
500 TABLET, FILM COATED ORAL 4 TIMES DAILY PRN
Status: DISCONTINUED | OUTPATIENT
Start: 2024-11-22 | End: 2024-12-01 | Stop reason: HOSPADM

## 2024-11-22 RX ORDER — DEXTROSE MONOHYDRATE 25 G/50ML
25-50 INJECTION, SOLUTION INTRAVENOUS
Status: DISCONTINUED | OUTPATIENT
Start: 2024-11-22 | End: 2024-11-27

## 2024-11-22 RX ORDER — METOCLOPRAMIDE 10 MG/1
10 TABLET ORAL EVERY 6 HOURS PRN
Status: DISCONTINUED | OUTPATIENT
Start: 2024-11-22 | End: 2024-11-27

## 2024-11-22 RX ORDER — PROCHLORPERAZINE MALEATE 10 MG
10 TABLET ORAL EVERY 6 HOURS PRN
Status: DISCONTINUED | OUTPATIENT
Start: 2024-11-22 | End: 2024-11-27

## 2024-11-22 RX ORDER — LEVOTHYROXINE SODIUM 112 UG/1
112 TABLET ORAL DAILY
Status: DISCONTINUED | OUTPATIENT
Start: 2024-11-23 | End: 2024-11-24

## 2024-11-22 RX ORDER — MAGNESIUM SULFATE HEPTAHYDRATE 40 MG/ML
4 INJECTION, SOLUTION INTRAVENOUS
Status: DISCONTINUED | OUTPATIENT
Start: 2024-11-22 | End: 2024-12-01 | Stop reason: HOSPADM

## 2024-11-22 RX ORDER — ONDANSETRON 2 MG/ML
4 INJECTION INTRAMUSCULAR; INTRAVENOUS EVERY 6 HOURS PRN
Status: DISCONTINUED | OUTPATIENT
Start: 2024-11-22 | End: 2024-11-27

## 2024-11-22 RX ORDER — HYDROXYZINE HYDROCHLORIDE 50 MG/1
50 TABLET, FILM COATED ORAL
Status: DISCONTINUED | OUTPATIENT
Start: 2024-11-22 | End: 2024-11-27

## 2024-11-22 RX ORDER — LIDOCAINE 40 MG/G
CREAM TOPICAL
Status: DISCONTINUED | OUTPATIENT
Start: 2024-11-22 | End: 2024-11-27

## 2024-11-22 RX ORDER — DIPHENHYDRAMINE HCL 25 MG
25 CAPSULE ORAL EVERY 6 HOURS PRN
Status: DISCONTINUED | OUTPATIENT
Start: 2024-11-22 | End: 2024-11-22

## 2024-11-22 RX ORDER — ACETAMINOPHEN 325 MG/1
975 TABLET ORAL EVERY 4 HOURS PRN
Status: DISCONTINUED | OUTPATIENT
Start: 2024-11-22 | End: 2024-11-27

## 2024-11-22 RX ORDER — MAGNESIUM SULFATE HEPTAHYDRATE 40 MG/ML
4 INJECTION, SOLUTION INTRAVENOUS ONCE
Status: COMPLETED | OUTPATIENT
Start: 2024-11-22 | End: 2024-11-22

## 2024-11-22 RX ORDER — DIPHENHYDRAMINE HYDROCHLORIDE 50 MG/ML
25 INJECTION INTRAMUSCULAR; INTRAVENOUS EVERY 6 HOURS PRN
Status: DISCONTINUED | OUTPATIENT
Start: 2024-11-22 | End: 2024-11-27

## 2024-11-22 RX ORDER — NIFEDIPINE 30 MG/1
30 TABLET, EXTENDED RELEASE ORAL ONCE
Status: COMPLETED | OUTPATIENT
Start: 2024-11-23 | End: 2024-11-22

## 2024-11-22 RX ORDER — CALCIUM GLUCONATE 94 MG/ML
1 INJECTION, SOLUTION INTRAVENOUS
Status: DISCONTINUED | OUTPATIENT
Start: 2024-11-22 | End: 2024-12-01 | Stop reason: HOSPADM

## 2024-11-22 RX ORDER — BISACODYL 10 MG
10 SUPPOSITORY, RECTAL RECTAL DAILY PRN
Status: DISCONTINUED | OUTPATIENT
Start: 2024-11-22 | End: 2024-11-27

## 2024-11-22 RX ORDER — PRENATAL VIT/IRON FUM/FOLIC AC 27MG-0.8MG
1 TABLET ORAL DAILY
Status: DISCONTINUED | OUTPATIENT
Start: 2024-11-22 | End: 2024-11-27

## 2024-11-22 RX ORDER — SODIUM CHLORIDE, SODIUM LACTATE, POTASSIUM CHLORIDE, CALCIUM CHLORIDE 600; 310; 30; 20 MG/100ML; MG/100ML; MG/100ML; MG/100ML
10-125 INJECTION, SOLUTION INTRAVENOUS CONTINUOUS
Status: DISCONTINUED | OUTPATIENT
Start: 2024-11-22 | End: 2024-11-24

## 2024-11-22 RX ORDER — NIFEDIPINE 30 MG/1
30 TABLET, EXTENDED RELEASE ORAL EVERY 24 HOURS
Status: DISCONTINUED | OUTPATIENT
Start: 2024-11-22 | End: 2024-11-22

## 2024-11-22 RX ORDER — PANTOPRAZOLE SODIUM 40 MG/1
40 TABLET, DELAYED RELEASE ORAL DAILY PRN
Status: DISCONTINUED | OUTPATIENT
Start: 2024-11-22 | End: 2024-11-27

## 2024-11-22 RX ORDER — METOCLOPRAMIDE 10 MG/1
10 TABLET ORAL EVERY 6 HOURS PRN
Status: DISCONTINUED | OUTPATIENT
Start: 2024-11-22 | End: 2024-11-22

## 2024-11-22 RX ORDER — LABETALOL HYDROCHLORIDE 5 MG/ML
20-80 INJECTION, SOLUTION INTRAVENOUS EVERY 10 MIN PRN
Status: DISCONTINUED | OUTPATIENT
Start: 2024-11-22 | End: 2024-11-22

## 2024-11-22 RX ORDER — HYDRALAZINE HYDROCHLORIDE 20 MG/ML
10 INJECTION INTRAMUSCULAR; INTRAVENOUS
Status: DISCONTINUED | OUTPATIENT
Start: 2024-11-22 | End: 2024-11-22

## 2024-11-22 RX ORDER — LABETALOL HYDROCHLORIDE 5 MG/ML
20-40 INJECTION, SOLUTION INTRAVENOUS EVERY 10 MIN PRN
Status: DISCONTINUED | OUTPATIENT
Start: 2024-11-22 | End: 2024-12-01 | Stop reason: HOSPADM

## 2024-11-22 RX ORDER — CYCLOBENZAPRINE HCL 5 MG
10 TABLET ORAL 3 TIMES DAILY PRN
Status: DISCONTINUED | OUTPATIENT
Start: 2024-11-22 | End: 2024-12-01 | Stop reason: HOSPADM

## 2024-11-22 RX ORDER — METOCLOPRAMIDE HYDROCHLORIDE 5 MG/ML
10 INJECTION INTRAMUSCULAR; INTRAVENOUS EVERY 6 HOURS PRN
Status: DISCONTINUED | OUTPATIENT
Start: 2024-11-22 | End: 2024-11-22

## 2024-11-22 RX ORDER — SODIUM CHLORIDE, SODIUM LACTATE, POTASSIUM CHLORIDE, CALCIUM CHLORIDE 600; 310; 30; 20 MG/100ML; MG/100ML; MG/100ML; MG/100ML
10-125 INJECTION, SOLUTION INTRAVENOUS CONTINUOUS
Status: CANCELLED | OUTPATIENT
Start: 2024-11-22

## 2024-11-22 RX ORDER — MAGNESIUM SULFATE HEPTAHYDRATE 40 MG/ML
2 INJECTION, SOLUTION INTRAVENOUS
Status: DISCONTINUED | OUTPATIENT
Start: 2024-11-22 | End: 2024-12-01 | Stop reason: HOSPADM

## 2024-11-22 RX ORDER — LIDOCAINE 40 MG/G
CREAM TOPICAL
Status: CANCELLED | OUTPATIENT
Start: 2024-11-22

## 2024-11-22 RX ORDER — SODIUM PHOSPHATE,MONO-DIBASIC 19G-7G/118
1 ENEMA (ML) RECTAL DAILY PRN
Status: DISCONTINUED | OUTPATIENT
Start: 2024-11-22 | End: 2024-11-27

## 2024-11-22 RX ORDER — CALCIUM CARBONATE 500 MG/1
1000 TABLET, CHEWABLE ORAL EVERY 6 HOURS PRN
Status: DISCONTINUED | OUTPATIENT
Start: 2024-11-22 | End: 2024-11-27

## 2024-11-22 RX ORDER — ONDANSETRON 4 MG/1
4 TABLET, ORALLY DISINTEGRATING ORAL EVERY 6 HOURS PRN
Status: DISCONTINUED | OUTPATIENT
Start: 2024-11-22 | End: 2024-11-27

## 2024-11-22 RX ORDER — ASPIRIN 81 MG/1
81 TABLET, CHEWABLE ORAL DAILY
Status: DISCONTINUED | OUTPATIENT
Start: 2024-11-22 | End: 2024-11-26

## 2024-11-22 RX ORDER — DIPHENHYDRAMINE HYDROCHLORIDE 50 MG/ML
25 INJECTION INTRAMUSCULAR; INTRAVENOUS EVERY 6 HOURS PRN
Status: DISCONTINUED | OUTPATIENT
Start: 2024-11-22 | End: 2024-11-22

## 2024-11-22 RX ORDER — HYDRALAZINE HYDROCHLORIDE 20 MG/ML
10 INJECTION INTRAMUSCULAR; INTRAVENOUS
Status: DISCONTINUED | OUTPATIENT
Start: 2024-11-22 | End: 2024-12-01 | Stop reason: HOSPADM

## 2024-11-22 RX ORDER — SIMETHICONE 80 MG
160 TABLET,CHEWABLE ORAL EVERY 6 HOURS PRN
Status: DISCONTINUED | OUTPATIENT
Start: 2024-11-22 | End: 2024-11-27

## 2024-11-22 RX ORDER — DIPHENHYDRAMINE HCL 25 MG
25 CAPSULE ORAL EVERY 6 HOURS PRN
Status: DISCONTINUED | OUTPATIENT
Start: 2024-11-22 | End: 2024-11-27

## 2024-11-22 RX ORDER — MAGNESIUM SULFATE IN WATER 40 MG/ML
2 INJECTION, SOLUTION INTRAVENOUS CONTINUOUS
Status: DISCONTINUED | OUTPATIENT
Start: 2024-11-22 | End: 2024-11-24

## 2024-11-22 RX ORDER — MAGNESIUM HYDROXIDE/ALUMINUM HYDROXICE/SIMETHICONE 120; 1200; 1200 MG/30ML; MG/30ML; MG/30ML
30 SUSPENSION ORAL 2 TIMES DAILY PRN
Status: DISCONTINUED | OUTPATIENT
Start: 2024-11-22 | End: 2024-11-27

## 2024-11-22 RX ORDER — METOCLOPRAMIDE HYDROCHLORIDE 5 MG/ML
10 INJECTION INTRAMUSCULAR; INTRAVENOUS EVERY 6 HOURS PRN
Status: DISCONTINUED | OUTPATIENT
Start: 2024-11-22 | End: 2024-11-27

## 2024-11-22 RX ORDER — ACETAMINOPHEN 325 MG/1
650 TABLET ORAL EVERY 4 HOURS PRN
Status: DISCONTINUED | OUTPATIENT
Start: 2024-11-22 | End: 2024-11-22

## 2024-11-22 RX ORDER — NICOTINE POLACRILEX 4 MG
15-30 LOZENGE BUCCAL
Status: DISCONTINUED | OUTPATIENT
Start: 2024-11-22 | End: 2024-11-27

## 2024-11-22 RX ORDER — DOCUSATE SODIUM 100 MG/1
100 CAPSULE, LIQUID FILLED ORAL 2 TIMES DAILY
Status: DISCONTINUED | OUTPATIENT
Start: 2024-11-22 | End: 2024-11-27

## 2024-11-22 RX ADMIN — MAGNESIUM SULFATE HEPTAHYDRATE 2 G/HR: 40 INJECTION, SOLUTION INTRAVENOUS at 13:12

## 2024-11-22 RX ADMIN — DOCUSATE SODIUM 100 MG: 100 CAPSULE, LIQUID FILLED ORAL at 20:18

## 2024-11-22 RX ADMIN — ACETAMINOPHEN 975 MG: 325 TABLET, FILM COATED ORAL at 22:54

## 2024-11-22 RX ADMIN — ACETAMINOPHEN 650 MG: 325 TABLET, FILM COATED ORAL at 17:25

## 2024-11-22 RX ADMIN — NIFEDIPINE 30 MG: 30 TABLET, FILM COATED, EXTENDED RELEASE ORAL at 23:57

## 2024-11-22 RX ADMIN — INSULIN ASPART 1 UNITS: 100 INJECTION, SOLUTION INTRAVENOUS; SUBCUTANEOUS at 18:44

## 2024-11-22 RX ADMIN — SODIUM CHLORIDE, POTASSIUM CHLORIDE, SODIUM LACTATE AND CALCIUM CHLORIDE 75 ML/HR: 600; 310; 30; 20 INJECTION, SOLUTION INTRAVENOUS at 12:32

## 2024-11-22 RX ADMIN — PRENATAL VIT W/ FE FUMARATE-FA TAB 27-0.8 MG 1 TABLET: 27-0.8 TAB at 17:26

## 2024-11-22 RX ADMIN — MAGNESIUM SULFATE HEPTAHYDRATE 4 G: 40 INJECTION, SOLUTION INTRAVENOUS at 12:33

## 2024-11-22 RX ADMIN — ASPIRIN 81 MG CHEWABLE TABLET 81 MG: 81 TABLET CHEWABLE at 17:26

## 2024-11-22 RX ADMIN — NIFEDIPINE 30 MG: 30 TABLET, FILM COATED, EXTENDED RELEASE ORAL at 13:27

## 2024-11-22 RX ADMIN — CYCLOBENZAPRINE 10 MG: 10 TABLET, FILM COATED ORAL at 18:22

## 2024-11-22 RX ADMIN — HYDRALAZINE HYDROCHLORIDE 10 MG: 20 INJECTION INTRAMUSCULAR; INTRAVENOUS at 12:08

## 2024-11-22 RX ADMIN — METHOCARBAMOL 500 MG: 500 TABLET ORAL at 22:54

## 2024-11-22 ASSESSMENT — ACTIVITIES OF DAILY LIVING (ADL)
ADLS_ACUITY_SCORE: 0

## 2024-11-22 NOTE — PROGRESS NOTES
Patient admitted with the diagnosis of preeclampsia with severe features. Patient transferred to PSCU room #423 at 1504. Report given and care transferred to NOEMI Sanchez.

## 2024-11-22 NOTE — PROGRESS NOTES
Data: [facility] presented to [facility]: 2024 11:32 AM.  Reason for maternal/fetal assessment is elevated blood pressures. Patient reports elevated BP at the clinic this morning. Patient denies uterine contractions, leaking of vaginal fluid/rupture of membranes, vaginal bleeding, abdominal pain, pelvic pressure, nausea, vomiting, headache, visual disturbances, epigastric or RUQ pain, significant edema. Patient reports fetal movement is normal. Patient is a 33w1d .  Prenatal record reviewed. Pregnancy has been complicated by diabetes and advanced maternal age (>=34yo).    Vital signs  184/94 and 174/92 at recheck in another arm . Support person is present.     Action: Verbal consent for EFM. Triage assessment completed.     Response: Patient verbalized agreement with plan. Will contact Dr. Shlomo Sheriff with update and further orders.

## 2024-11-22 NOTE — PROGRESS NOTES
[facility]  Magnesium Check Note    S:   Feeling okay. She does have a new headache, about 5-6/10. She has taken tylenol for this. She typically drinks tea but did not this morning. She does sometimes get headaches when she doesn't drink her tea, so she wonders if this is associated. No vision changes, spots in vision, chest pain, shortness of breath, RUQ or epigastric pain.    O:  Patient Vitals for the past 4 hrs:   BP SpO2 Weight   24 1710 134/71 100 % --   24 1640 133/71 100 % --   24 1610 (!) 147/85 100 % --   24 1540 (!) 152/95 99 % --   24 1520 (!) 144/85 100 % --   24 1510 (!) 154/91 99 % --   24 1450 139/85 -- --   24 1435 (!) 162/96 100 % --   24 1430 -- 100 % --   24 1420 (!) 148/91 100 % --   24 1413 -- -- 88.4 kg (194 lb 14.4 oz)   24 1405 119/65 -- --   24 1400 -- 99 % --     Gen: Resting comfortably in bed  CV:  RRR, no murmurs  Pulm:  CTAB, no wheezes or crackles  Abd:  Gravid, soft, non-tender  Ext:  Patellar reflexes 2+ bilaterally, brachial reflexes 2+, biceps 3+, 1 beat clonus bilaterally    FHT: Baseline 130, moderate variability, + accelerations, no decelerations  [place]: no contractions    I/O:  I/O last 3 completed shifts:  In: -   Out: 550 [Urine:550]    A/P:  Ravifranco Peñaloza is a 44 year old  at 33w1d by LMP c/w 7w6d US, admitted in setting of preeclampsia with severe features (BP).    Preeclampsia with severe features (BP)  Noted first in clinic, sustained on arrival for evaluation on L&D. Improvement in blood pressures to low mild range after IV hydralazine x1.  - s/p IV hydralazine 10 ( 1208)  - BP: most recently normotensive  - UOP: 6.26 cc/hr over last 2 hours. Strict Is/Os.   - Symptoms: Headache, just got tylenol, will add flexeril in setting of neck pain.  - HELLP labs nl (), next in AM  - Mag sulfate for seizure prophylaxis: 2g/hr  - Next clinical mag check at      Antepartum Hospital Admission  - Currently cephalic; however, given hospital admission, severe fetal growth restriction, and continuous monitoring, did discuss possible need for emergency  during hospital admission. Also discussed additional indications for  deliveries (malpresentation, category 2, labor concerns).   - Consent: Discussed risks and benefits of procedure, including but not limited to bleeding, infection, injury to surrounding organs, injury to infant, and the potential need for another surgery should some injury go unrecognized or patient were to have continued bleeding. Patient had time to ask questions and expressed understanding of procedure and associated risks. Agreed to blood transfusion if necessary. Consent signed. Patient additionally consented for possible classical hysterotomy; discussed indications, potential need for 36-37w  delivery in subsequent pregnancies.    FWB  - Reactive and reassuring for gestational age.  - Continuous EFM, [place] while on magnesium     Shlomo Sheriff MD  OB/GYN PGY-3  2024 5:47 PM

## 2024-11-22 NOTE — CONSULTS
Neonatology Antepartum Counseling Consult:  I was asked to provide antepartum counseling for Ravi Peñaloza at the request of Kala Cloud MD secondary to Pre-eclampsia, sFGR, and A2GDM. Ms. Ravi Peñaloza is currently 33 weeks/ 1 days gestation and has a history significant for:  Patient Active Problem List   Diagnosis    Supervision of high risk pregnancy in third trimester    Insulin controlled gestational diabetes mellitus (GDM) in third trimester    Acquired hypothyroidism    Fetal growth restriction antepartum    Uterine fibroids affecting pregnancy in third trimester    AMA (advanced maternal age) primigravida 35+, third trimester    Encounter for triage in pregnant patient    Gestational hypertension    Severe preeclampsia      Betamethasone was administered on 11/18 and 11/19.   Ms. Ravi Peñaloza, was counseled on the expected hospital course, potential risks, and outcomes associated with an infant born at this gestation. The counseling included:  initial delivery room stabilization, respiratory course, lung development, hyperbilirubinemia, nutrition, growth and development, and long term outcomes.    I also explained the basic four criteria for discharge: that the baby had to be free of apnea; able to maintain their body temperature; able to feed by bottle or breast well enough to; attain an adequate pattern of weight gain and growth.    The patient had no remaining questions but was encouraged to contact the NICU via their caregivers should any arise.  Please feel free to call and thank you for involving the NICU team in the care of your patient.      Floor Time (min): 15  Face to Face Time (min): 20  Total Time (minutes): 35  More than 50% of my time was spent in direct, face to face, antepartum counseling with the above patient.    Amy Paz PA-C on 11/22/2024 at 4:06 PM

## 2024-11-22 NOTE — LETTER
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 Please allow her frequent breaks if she so needs them.     Please contact me for questions or concerns.      Sincerely,      Diana Galindo MD

## 2024-11-22 NOTE — H&P
Antepartum History and Physical     2024  Ravi Peñaloza  [id number]      [facility]     Ravi Peñaloza is a 44 year old  at 33w1d by 7w6d US who presents to triage after being found to have severe range blood pressures in the office this morning. She was discharged from the antepartum service yesterday (-) after being admitted to rule out of pre-eclampsia in the setting of gestational hypertension, GDMA2, sFGR (1%ile), hypothyroidism, uterine fibroid, and AMA. She had been feeling fine since discharge, but returned after she was found to have elevated blood pressures.     She is feeling well today, without any symptoms other than some neck pain, which she believes is from sleeping on a new mattress.  She denies headache, vision changes, chest pain, shortness of breath, fever, chills, nausea, vomiting or other systemic complaints. She has no vaginal bleeding or loss of fluid and is feeling normal fetal movement.     ROS:  No headaches, vision changes, nausea, vomiting, fevers, chills, chest pain, SOB,  abdominal pain, constipation, diarrhea, dysuria, changes in vaginal discharge or edema in extremities noted.     Her pregnancy has been complicated by:  - Gestational HTN   - Severe fetal growth restriction (EFW 1%, AC <1%)   - Gestational Diabetes A2  - Hypothyroid  - 12cm left lateral fibroid  - Advanced maternal age      OB History    Para Term  AB Living   1 0 0 0 0 0   SAB IAB Ectopic Multiple Live Births   0 0 0 0 0      # Outcome Date GA Lbr Se/2nd Weight Sex Type Anes PTL Lv   1 Current               Obstetric Comments   *First Pregnancy           Past Medical History     Past Medical History:   Diagnosis Date    Fibroid uterus     Gestational diabetes     Hypothyroidism     Migraine without aura and without status migrainosus, not intractable        Past Surgical History     Past Surgical History:   Procedure Laterality Date    CHOLECYSTECTOMY  2018    LIPOSUCTION  (LOCATION)  2014       Medications     Current Facility-Administered Medications   Medication Dose Route Frequency Provider Last Rate Last Admin    acetaminophen (TYLENOL) tablet 650 mg  650 mg Oral Q4H PRN Shlomo Sheriff MD        alum & mag hydroxide-simethicone (MAALOX) suspension 30 mL  30 mL Oral BID PRN Shlomo Sheriff MD        aspirin (ASA) chewable tablet 81 mg  81 mg Oral Daily Shlomo Sheriff MD        benzocaine-menthol (CHLORASEPTIC) 6-10 MG lozenge 1 lozenge  1 lozenge Buccal Q1H PRN Shlomo Sheriff MD        bisacodyl (DULCOLAX) suppository 10 mg  10 mg Rectal Daily PRN Shlomo Sheriff MD        calcium carbonate (TUMS) chewable tablet 1,000 mg  1,000 mg Oral Q6H PRN Shlomo Sheriff MD        calcium gluconate 10 % injection 1 g  1 g Intravenous Once PRN Shlomo Sheriff MD        glucose gel 15-30 g  15-30 g Oral Q15 Min PRN Shlomo Sheriff MD        Or    dextrose 50 % injection 25-50 mL  25-50 mL Intravenous Q15 Min PRN Shlomo Sheriff MD        Or    glucagon injection 1 mg  1 mg Subcutaneous Q15 Min PRN Shlomo Sheriff MD        diphenhydrAMINE (BENADRYL) capsule 25 mg  25 mg Oral Q6H PRN Shlomo Sheriff MD        Or    diphenhydrAMINE (BENADRYL) injection 25 mg  25 mg Intravenous Q6H PRN Shlomo Sheriff MD        docusate sodium (COLACE) capsule 100 mg  100 mg Oral BID Shlomo Sheriff MD        hydrALAZINE (APRESOLINE) injection 10 mg  10 mg Intravenous Q20 Min PRN Shlomo Sheriff MD   10 mg at 11/22/24 1208    hydrOXYzine HCl (ATARAX) tablet 50 mg  50 mg Oral At Bedtime PRN Shlomo Sheriff MD        insulin aspart (NovoLOG) injection (RAPID ACTING)  1-8 Units Subcutaneous TID AC Shlomo Sheriff MD        insulin aspart (NovoLOG) injection (RAPID ACTING)  1-5 Units Subcutaneous At Bedtime Shlomo Sheriff MD        labetalol (NORMODYNE/TRANDATE) injection 20-40 mg  20-40 mg Intravenous Q10 Min PRN Shlomo Sheriff MD        lactated ringers infusion   mL/hr Intravenous Continuous Sharita,  MD Shlomo 75 mL/hr at 11/22/24 1232 75 mL/hr at 11/22/24 1232    [START ON 11/23/2024] levothyroxine (SYNTHROID/LEVOTHROID) tablet 112 mcg  112 mcg Oral Daily Shlomo Sheriff MD        lidocaine (LMX4) cream   Topical Q1H PRN Shlomo Sheriff MD        lidocaine (LMX4) cream   Topical Q1H PRN Shlomo Sheriff MD        lidocaine 1 % 0.1-1 mL  0.1-1 mL Other Q1H PRN Shlomo Sheriff MD        lidocaine 1 % 0.1-1 mL  0.1-1 mL Other Q1H PRN Shlomo Sheriff MD        magnesium sulfate 2 g in 50 mL sterile water intermittent infusion  2 g Intravenous Once PRN Shlomo Sheriff MD        magnesium sulfate 4 g in 100 mL sterile water intermittent infusion  4 g Intravenous Once PRN Shlomo Sheriff MD        magnesium sulfate infusion  2 g/hr Intravenous Continuous Shlomo Sheriff MD 50 mL/hr at 11/22/24 1312 2 g/hr at 11/22/24 1312    metoclopramide (REGLAN) tablet 10 mg  10 mg Oral Q6H PRN Shlomo Sheriff MD        Or    metoclopramide (REGLAN) injection 10 mg  10 mg Intravenous Q6H PRN Shlomo Sheriff MD        midazolam (VERSED) injection 2 mg  2 mg Intravenous Q5 Min PRN Shlomo Sheriff MD        NIFEdipine ER OSMOTIC (PROCARDIA XL) 24 hr tablet 30 mg  30 mg Oral Q24H Shlomo Sheriff MD   30 mg at 11/22/24 1327    ondansetron (ZOFRAN ODT) ODT tab 4 mg  4 mg Oral Q6H PRN Shlomo Sheriff MD        Or    ondansetron (ZOFRAN) injection 4 mg  4 mg Intravenous Q6H PRN Shlomo Sheriff MD        pantoprazole (PROTONIX) EC tablet 40 mg  40 mg Oral Daily PRN Shlomo Sheriff MD        prenatal multivitamin w/iron per tablet 1 tablet  1 tablet Oral Daily Shlomo Sheriff MD        prochlorperazine (COMPAZINE) tablet 10 mg  10 mg Oral Q6H PRN Shlomo Sheriff MD        Or    prochlorperazine (COMPAZINE) injection 10 mg  10 mg Intravenous Q6H PRN Shlomo Sheriff MD        simethicone (MYLICON) chewable tablet 160 mg  160 mg Oral Q6H PRN Shlomo Sheriff MD        sodium chloride (PF) 0.9% PF flush 3 mL  3 mL Intracatheter Q8H Shlomo Sheriff,  MD        sodium chloride (PF) 0.9% PF flush 3 mL  3 mL Intracatheter q1 min prn Shlomo Sheriff MD        sodium chloride (PF) 0.9% PF flush 3 mL  3 mL Intracatheter Q8H Shlomo Sheriff MD        sodium chloride (PF) 0.9% PF flush 3 mL  3 mL Intracatheter q1 min prn Shlomo Sheriff MD        sodium phosphate (FLEET ENEMA) 1 enema  1 enema Rectal Daily PRN Shlomo Sheriff MD           Allergies   No Known Allergies    Family History     Family History   Problem Relation Age of Onset    Hypertension Mother     Osteoarthritis Mother     Hypothyroidism Mother     Hypertension Father     Diabetes Type 2  Father     No Known Problems Maternal Grandmother     No Known Problems Maternal Grandfather     Diabetes Type 2  Paternal Grandmother     Asthma Paternal Grandfather     No Known Problems Brother     No Known Problems Brother        Social History     Social History     Socioeconomic History    Marital status:      Spouse name: None    Number of children: None    Years of education: None    Highest education level: None   Tobacco Use    Smoking status: Never    Smokeless tobacco: Never   Substance and Sexual Activity    Alcohol use: Not Currently     Comment: occasional prior to pregancy    Drug use: Never    Sexual activity: Yes     Partners: Male     Social Drivers of Health     Financial Resource Strain: Low Risk  (11/18/2024)    Financial Resource Strain     Within the past 12 months, have you or your family members you live with been unable to get utilities (heat, electricity) when it was really needed?: No   Food Insecurity: Low Risk  (11/18/2024)    Food Insecurity     Within the past 12 months, did you worry that your food would run out before you got money to buy more?: No     Within the past 12 months, did the food you bought just not last and you didn t have money to get more?: No   Transportation Needs: Low Risk  (11/18/2024)    Transportation Needs     Within the past 12 months, has lack of  transportation kept you from medical appointments, getting your medicines, non-medical meetings or appointments, work, or from getting things that you need?: No   Interpersonal Safety: Low Risk  (11/18/2024)    Interpersonal Safety     Do you feel physically and emotionally safe where you currently live?: Yes     Within the past 12 months, have you been hit, slapped, kicked or otherwise physically hurt by someone?: No     Within the past 12 months, have you been humiliated or emotionally abused in other ways by your partner or ex-partner?: No   Housing Stability: Low Risk  (11/18/2024)    Housing Stability     Do you have housing? : Yes     Are you worried about losing your housing?: No       ROS   10-point ROS negative except as indicated in HPI.    Physical Exam     Vitals:    11/22/24 1435 11/22/24 1450 11/22/24 1510 11/22/24 1520   BP: (!) 162/96 139/85 (!) 154/91 (!) 144/85   BP Location: Left arm Left arm     Patient Position: Sitting Sitting     Cuff Size: Adult Large Adult Large     Resp:       Temp:       TempSrc:       SpO2: 100%  99% 100%   Weight:         General: alert, oriented female, resting in bed in NAD  CV: regular rate and rhythm, normal s1 and s2, no murmurs  Lungs: clear bilaterally, no crackles or wheezes  Abdomen: soft, gravid, non-tender  Extremities: bilateral lower extremities non-tender with no edema    FHT: baseline 140, no variability, accelerations +, no decelerations  [place]: quiet  Impression: reactive     Labs     Lab Results   Component Value Date    AS Negative 11/22/2024    CHPCRT Negative 05/30/2024    GCPCRT Negative 05/30/2024    HGB 10.9 (L) 11/22/2024           Results for orders placed or performed during the hospital encounter of 11/22/24 (from the past 24 hours)   CBC with platelets   Result Value Ref Range    WBC Count 11.7 (H) 4.0 - 11.0 10e3/uL    RBC Count 3.75 (L) 3.80 - 5.20 10e6/uL    Hemoglobin 10.9 (L) 11.7 - 15.7 g/dL    Hematocrit 32.5 (L) 35.0 - 47.0 %    MCV 87  78 - 100 fL    MCH 29.1 26.5 - 33.0 pg    MCHC 33.5 31.5 - 36.5 g/dL    RDW 14.3 10.0 - 15.0 %    Platelet Count 202 150 - 450 10e3/uL   Comprehensive Metabolic Panel   Result Value Ref Range    Sodium 136 135 - 145 mmol/L    Potassium 3.9 3.4 - 5.3 mmol/L    Carbon Dioxide (CO2) 18 (L) 22 - 29 mmol/L    Anion Gap 11 7 - 15 mmol/L    Urea Nitrogen 13.9 6.0 - 20.0 mg/dL    Creatinine 0.63 0.51 - 0.95 mg/dL    GFR Estimate >90 >60 mL/min/1.73m2    Calcium 8.8 8.8 - 10.4 mg/dL    Chloride 107 98 - 107 mmol/L    Glucose 87 70 - 99 mg/dL    Alkaline Phosphatase 110 40 - 150 U/L    AST 25 0 - 45 U/L    ALT 28 0 - 50 U/L    Protein Total 6.0 (L) 6.4 - 8.3 g/dL    Albumin 3.1 (L) 3.5 - 5.2 g/dL    Bilirubin Total <0.2 <=1.2 mg/dL   ABO/Rh type and screen    Narrative    The following orders were created for panel order ABO/Rh type and screen.  Procedure                               Abnormality         Status                     ---------                               -----------         ------                     Adult Type and Screen[[id number]]                            Final result                 Please view results for these tests on the individual orders.   Adult Type and Screen   Result Value Ref Range    ABO/RH(D) B POS     Antibody Screen Negative Negative    SPECIMEN EXPIRATION DATE [id number]    Protein  random urine   Result Value Ref Range    Total Protein Urine mg/dL 11.5   mg/dL    Total Protein Urine mg/mg Creat 0.42 (H) 0.00 - 0.20 mg/mg Cr    Creatinine Urine mg/dL 27.5 mg/dL   Glucose by meter   Result Value Ref Range    GLUCOSE BY METER POCT 88 70 - 99 mg/dL   Glucose by meter   Result Value Ref Range    GLUCOSE BY METER POCT 128 (H) 70 - 99 mg/dL        Imaging     [facility] 11/22:  METHOD  ---------------------------------------------------------------------------------------------------------  Transabdominal ultrasound examination. View: Sufficient         PREGNANCY  ---------------------------------------------------------------------------------------------------------  Monge pregnancy. Number of fetuses: 1        DATING  ---------------------------------------------------------------------------------------------------------                                           Date                                Details                                                                                      Gest. age                      JARED  LMP                                  4/4/2024                                                                                                                           33 w + 1 d                     1/9/2025  Previous U/S                      5/30/2024                        GA, GA 7 w + 6 d                                                                       33 w + 0 d                     1/10/2025  Assigned dating                  based on the LMP, selected on 10/30/2024                                                                         33 w + 1 d                     1/9/2025        GENERAL EVALUATION  ---------------------------------------------------------------------------------------------------------  Cardiac activity present.  bpm. Fetal movements: visualized. Presentation: cephalic  Placenta: Right lateral, No Previa, > 2 cm from internal os.  Umbilical cord: 3 vessel cord  Amniotic fluid: Amount of AF: normal. MVP 3.5 cm        FETAL DOPPLER  ---------------------------------------------------------------------------------------------------------  Umbilical Artery: normal  PI                                                             1.04                                                  78%                             Omar  HR                                                           143                     bpm        MATERNAL  STRUCTURES  ---------------------------------------------------------------------------------------------------------  Right Ovary                          Not examined  Left Ovary                            Not examined        NON STRESS TEST  ---------------------------------------------------------------------------------------------------------  NST interpretation: reactive. Test duration 36 min. Baseline  bpm. Baseline variability: moderate. Accelerations: present. Decelerations: absent. Uterine activity:  absent. CTG category: normal/reassuring. Acoustic stimulation: no        RECOMMENDATION  ---------------------------------------------------------------------------------------------------------  Continue  surveillance with fetal growth scans every 3 weeks and fetal surveillance with twice weekly modified BPP and UA Doppler waveform assessment. We  presume this will be done in your office. If you would prefer future ultrasound be done in the Maternal-Fetal Medicine clinic, please let us know.     BP was 155/101 followed by 168/108 mmHg and 156/99 mmHg.     PAtient referred to L and D for assessment of BP. Recommend delivery by 34 weeks if severe range BP confirmed. If no recurrent severe range BP, can continue with  outpatient surveillance with delivery for diagnosis fo severe range BP, preeclampsia or by 37 weeks.     Thank you for the opportunity to participate in the care of this patient. If you have questions regarding today's evaluation or if we can be of further service, please contact the  [facility].     **Fetal anomalies may be present but not detected**                                                                     IMPRESSION  ---------------------------------------------------------------------------------------------------------  Patient is here for antepartum testing secondary to AMA, FGR, BMI > 35 kg/m2, GHTN.  1) Intrauterine pregnancy at 33 1/7 weeks  gestational age.  2) The NST (performed for FGR) is reassuring.  3) The amniotic fluid volume appears normal.  4) The UAR Doppler waveform (performed for FGR) is normal.    [facility] BPP SINGLE :    IMPRESSION  ---------------------------------------------------------------------------------------------------------  1. Monge pregnancy at 33w 0d gestational age.  2. The amniotic fluid volume appeared normal.  3. The BPP was 8/10 (non reactive NST).  4. Transverse presentation.     Assessment/Plan     44 year old  at 33w1d by 7w6d US, admitted in the setting of increased blood pressures found in the clinic. In the clinic and upon arrival to triage, Ravi had severe range blood pressures and met criteria for pre-eclampsia with severe features by blood pressure criteria. She also was found to have a total urine protein of 0.42 today, which is increased from 0.17 on admission from . She is s/p BMZ for fetal lung maturity (-). Given her new diagnosis of pre-eclampsia with severe features, she will be admitted for blood pressure control, IV magnesium, and close maternal and fetal monitoring with plan to deliver at 34 weeks, unless indicated sooner.     # Preeclampsia w/ SF (by blood pressure)  # Gestational hypertension   - Serial BP monitoring, q4h BP checks  - BPs: Severe range in office and on admission (160s-180s/90s) > IV hydralazine 10 mg (1208) > 144/85. Pressures have remained in the high mild range with one NSSR @1435.   - Antihypertensives: IV antihypertensives PRN for sustained severe range blood pressures (SBP>160, DBP>110 sustained over 15 minutes)  - D#1 PO Nifedipine ER 30 mg daily  - Magnesium sulfate 4g / 2g/hr started @ 1233, .  - Labs: HELLP labs WNL, UPC 0.17 on  > 0.42 today.  - Daily weights, strict I&Os  - No subjective signs/symptoms of preeclampsia  - Discussed delivery timing at 34w0d unless indicated sooner  - NICU consult today   - continue PTA aspirin 81 mg    #  Gestational Diabetes A2    - D#1 medium insulin resistance dosing; Preprandial novolog sliding scale (1-8 units) 3 times a day before meals, at bedtime (1-5 units).  - Blood Glucose Checks: Fasting, TID AC, PP, at bedtime   - 2X weekly BPP     # Severe Fetal Growth Restriction  Known growth restriction prior to this hospitalization. Now with less than anticipated interval growth since last US 10/30.  - Nahum 10/30 EFW 5% (1196g), AC 3%  - Nahum  EFW 1% (1483g), AC <1%. UA dopplers with normal flow.  -plan for 2X weekly UA doppler      # Hypothyroidism   - PTA levothyroxine 112mcg PO qdaily  - TSH 0.823 10/20/24     Inpatient Management  - up ad gabo  - Regular diet  - SCD's and prophylactic Lovenox BID  - Q72 hour CBC type/screen     # FWB  - BID NSTs for fetal monitoring   - Ultrasound surveillance plan: BPP 2X weekly M/R and UA doppler 2X weekly   - s/p BMZ for fetal lung maturity (-)   - Magnesium 4g / 2g/hr started (1233, )  - s/p NICU consult     Routine prenatal care  - Rh positive; Antibody negative  - Rubella immune, Hepatitis B NR, Hepatitis C NR, HIV NR, RPR negative (1st trimester and on admission), GBS negative    - GC/CT negative  - GCT - Patient dx with GDMA2    - Other prenatal labs wnl   - Immunizations: Flu declined, TDAP 2024   - Pap  normal   - Contraception: Need to discuss  - Feeding:  Need to discuss    Delivery Plan:    - MOD: Induction of labor for vaginal delivery preferred, pending clinical course and fetal lie. Last was cephalic in the office this morning but will recheck. Consented for  section  in the case that vaginal delivery is no longer a safe option for Ravi or baby.   - TOD:  <= 34 weeks    # PPH Risk:  - Medium, 12cm fibroid (no prior uterine incisions, sarmiento, <5 prior vaginal deliveries, no h/o pph or bleeding disorder), Type and screen ordered     Patient seen and care plan discussed under supervision of Dr. Shlomo Sheriff, PGY-3 and   Dr. Kala Cloud.    Dee Dee Orozco, MS4  5:16 PM   Physician Attestation   I saw this patient with the medical student and edited the note to reflect     Key findings:  44 year old  at 33w1d by 7w6d US, admitted with new finding of sustained range BP and new elevated urine protein:creatinine consistent with preeclampsia with severe features. HELLP labs otherwise in normal limits Started nifedipine 30 mg ER. Has already received BMTZX2. Reviewed plan for inpatient admission and goal for delivery at 34 weeks.  Has sFGR. Plan 2X UA doppler weekly monitoring.  For GDMA2, plan preprandial sliding scale before meals, at bedtime.       Kala Cloud MD,PhD  Date of Service (when I saw the patient): 24         I spent a total of 45 minutes on the date of this encounter including preparing to see the patient (reviewing medical records/tests), counseling and discussing the plan of care, documenting the visit in the electronic medical record, and communicating with other health care professionals and/or care coordination.

## 2024-11-22 NOTE — DISCHARGE SUMMARY
[facility] Discharge Summary    Ravi Peñaloza MRN# [id number]   Age: 44 year old YOB: 1980     Date of Admission:  2024  Date of Discharge:  ***  Admitting Physician:  Kala Cloud MD  Discharge Physician:  ***     Admission Diagnosis:  -   at 33w1d   - Pre-eclampsia with SF by blood pressure  - Gestational HTN   - Severe fetal growth restriction (EFW 1%, AC <1%)   - Gestational Diabetes A2  - Hypothyroid  - 12cm left lateral fibroid  - Advanced maternal age     Discharge Diagnosis:  ***    Procedures:  ***    Consultations:  ***    Medications prior to admission:  Medications Prior to Admission   Medication Sig Dispense Refill Last Dose/Taking    acetone urine (KETOSTIX) test strip Check ketones once daily in urine 100 strip 2 2024    aspirin (ASA) 81 MG chewable tablet Take 81 mg by mouth daily.   2024    insulin aspart (NOVOLOG PEN) 100 UNIT/ML pen Inject 1-8 Units subcutaneously 3 times daily (before meals). 15 mL 0 2024    insulin aspart (NOVOLOG PEN) 100 UNIT/ML pen Inject 1-5 Units subcutaneously at bedtime. 15 mL 0 2024    insulin NPH (NOVOLIN N FLEXPEN) 100 UNIT/ML injection Inject 8 Units subcutaneously at bedtime. 15 mL 3 2024    insulin pen needle (31G X 5 MM) 31G X 5 MM miscellaneous Use 1 pen needles daily or as directed. 30 each 1 2024    levothyroxine (SYNTHROID/LEVOTHROID) 112 MCG tablet Take 1 tablet (112 mcg) by mouth daily. Plus additional 1/2 tab weekly 60 tablet 0 2024    levothyroxine (SYNTHROID/LEVOTHROID) 88 MCG tablet Take 1 tablet (88 mcg) by mouth daily. 90 tablet 3 2024    Prenatal MV-Min-Fe Fum-FA-DHA (PRENATAL MULTIVITAMIN PLUS DHA PO) Take 1 tablet by mouth daily.   2024         Brief History of Presentation:    Ravi Peñaloza is a 44 year old  at 33w1d by 7w6d US who presents to triage after being found to have severe range blood pressures in the office this  morning. She was discharged from the antepartum service yesterday (-) after being admitted to rule out of pre-eclampsia in the setting of gestational hypertension, GDMA2, sFGR (1%ile), hypothyroidism, uterine fibroid, and AMA. She had been feeling fine since discharge, but returned after she was found to have elevated blood pressures.      She is feeling well today, without any symptoms other than some neck pain, which she believes is from sleeping on a new mattress.  She denies headache, vision changes, chest pain, shortness of breath, fever, chills, nausea, vomiting or other systemic complaints. She has no vaginal bleeding or loss of fluid and is feeling normal fetal movement.         Antepartum Course:  She was admitted for close monitoring and management until delivery, after meeting criteria for severe pre-eclampsia. She required one dose of IV hydralazine on admission. Her HELLP labs on admission were within normal limits, though her UPC was noted to be elevated to 0.42. She received 24 hours of magnesium. She was started on Nifedipine 30 mg for long-acting antihypertensive therapy. This regimen was titrated up during her stay to Nifedipine 30 mg in the AM/60 mg PM as well as Labetalol 400 TID , with subsequent normal blood pressures initially. She received BMZ in her prior admission (-). She underwent biweekly  testing with uterine artery dopplers in the setting of severe fetal growth restriction; testing remained reassuring throughout her admission . Her long acting insulin was increased to 10u on hospital day due to elevated fasting blood glucose values , with subsequent improvement in blood glucose control. She additionally had a high sliding correction scale ordered. She was continued on her pre-admission Synthroid dose in the setting of hypothyroidism.     Intrapartum Course   Indications:  She was managed inpatient until she had a persistent headache and delivery was  recommended. She was induced and progressed to 3cm dilation and developed a persistent category 2 tracing which was unable to be resolved despite attempts ant intrauterine resuscitation. Recommendation at this point was made to proceed with CS for cat 2 remote from delivery.  The risks, benefits, and alternatives of  section were discussed with the patient, and she agreed to proceed.        Operative Findings:  - A single liveborn female weighing 1470g with apgars of 9 and 9.  - Arterial cord blood: 7.29 pH, 3.5 base excess. Venous cord blood: 7.34 pH, 2.7 base excess.  - Uterus with large ~11cm intramural fibroid on left side of uterus. Normal appearing fallopian tubes, ovaries.    - No nuchal cord. Minimal clear amniotic fluid.   - No intraabdominal or recto-fascial adhesions.     EBL from the delivery was 594 mL. Please see her  Section Operative Note for full details regarding her delivery.    Her postoperative course was uncomplicated ***. On POD#***, she was meeting all of her postpartum goals and deemed stable for discharge. She was voiding without difficulty, tolerating a regular diet without nausea and vomiting, her pain was well controlled on oral pain medicines and her lochia was appropriate. Her hemoglobin prior to delivery was *** and after delivery was ***. Her Rh status was B pos and Rhogam was not indicated.          Discharge Medications:     Review of your medicines        UNREVIEWED medicines. Ask your doctor about these medicines        Dose / Directions   aspirin 81 MG chewable tablet  Commonly known as: ASA      Dose: 81 mg  Take 81 mg by mouth daily.  Refills: 0     * insulin aspart 100 UNIT/ML pen  Commonly known as: NovoLOG PEN  Used for: Insulin controlled gestational diabetes mellitus (GDM) in third trimester      Dose: 1-8 Units  Inject 1-8 Units subcutaneously 3 times daily (before meals).  Quantity: 15 mL  Refills: 0     * insulin aspart 100 UNIT/ML pen  Commonly known  as: NovoLOG PEN  Used for: Insulin controlled gestational diabetes mellitus (GDM) in third trimester      Dose: 1-5 Units  Inject 1-5 Units subcutaneously at bedtime.  Quantity: 15 mL  Refills: 0     * levothyroxine 88 MCG tablet  Commonly known as: SYNTHROID/LEVOTHROID  Used for: Hypothyroidism due to Hashimoto thyroiditis      Dose: 88 mcg  Take 1 tablet (88 mcg) by mouth daily.  Quantity: 90 tablet  Refills: 3     * levothyroxine 112 MCG tablet  Commonly known as: SYNTHROID/LEVOTHROID  Used for: Hypothyroidism due to Hashimoto thyroiditis      Dose: 112 mcg  Take 1 tablet (112 mcg) by mouth daily. Plus additional 1/2 tab weekly  Quantity: 60 tablet  Refills: 0     NovoLIN N FlexPen 100 UNIT/ML injection  Used for: Insulin controlled gestational diabetes mellitus (GDM) in third trimester  Generic drug: insulin NPH      Dose: 8 Units  Inject 8 Units subcutaneously at bedtime.  Quantity: 15 mL  Refills: 3     PRENATAL MULTIVITAMIN PLUS DHA PO      Dose: 1 tablet  Take 1 tablet by mouth daily.  Refills: 0           * This list has 4 medication(s) that are the same as other medications prescribed for you. Read the directions carefully, and ask your doctor or other care provider to review them with you.                CONTINUE these medicines which have NOT CHANGED        Dose / Directions   acetone urine test strip  Commonly known as: KETOSTIX  Used for: Gestational diabetes      Check ketones once daily in urine  Quantity: 100 strip  Refills: 2     insulin pen needle 31G X 5 MM miscellaneous  Commonly known as: 31G X 5 MM  Used for: Insulin controlled gestational diabetes mellitus (GDM) in third trimester      Use 1 pen needles daily or as directed.  Quantity: 30 each  Refills: 1                Discharge Instructions:  ***Ante***  Call or present to labor and delivery if you experience:   -Regular painful contractions concerning for labor   -Leakage of fluid concerning for ruptured membranes   -Decreased fetal  movement   -Bright red vaginal bleeding    -Headache, vision changes, upper abdominal pain, significant increase in swelling,   generalized unwell feeling    Follow up in [facility] clinic on ***    ***VD***  Ravi Peñaloza was discharged to home in stable condition with the following instructions/medications:  1) Call for temperature > 100.4, bright red vaginal bleeding >1 pad an hour x 2 hours, foul smelling vaginal discharge, pain not controlled by usual oral pain meds, persistent nausea and vomiting not controlled on medications  2) She desired *** for contraception.  3) For feeding she decided to ***.  4) She was instructed to follow-up with her primary OB in 6 weeks for a routine postpartum visit and ***.    ***CS***  Discharge diet: Regular   Discharge activity: No lifting greater than 20 lbs, pushing, pulling, or other strenuous activity for 6 weeks. Pelvic rest for 6 weeks including no sexual intercourse, tampons, or douching. No driving until you can slam on the brakes without pain or while on narcotic pain medications.    Discharge follow-up: Follow up with primary OB for routine postpartum visit in 6 weeks  ***   Wound care: Keep incision clean and dry         ***           These medications were sent to [place], [place] - [address]  [address] [facility] [address], [facility] [address]      Phone: [phone]   acetaminophen 325 MG tablet  ferrous sulfate 325 (65 Fe) MG tablet  ibuprofen 600 MG tablet  labetalol 200 MG tablet  levothyroxine 88 MCG tablet  NIFEdipine ER 30 MG 24 hr tablet  NIFEdipine ER 60 MG 24 hr tablet  senna-docusate 8.6-50 MG tablet       Ravi JAK Peñaloza was discharged to home in stable condition with the following instructions/medications:  1) Call for temperature > 100.4, bright red vaginal bleeding >1 pad an hour x 2 hours, foul smelling vaginal discharge, pain not controlled by usual oral pain meds, persistent nausea and vomiting not controlled on medications  2) She declined contraception and recommended interval pregnancy spacing was discussed.   3) For feeding she was pumping for her infant in the NICU.   4) She was instructed to follow-up with her primary OB in 6 weeks for a routine postpartum visit and she will follow with [facility] for her blood pressures.    Discharge diet: Regular   Discharge activity: No lifting greater than 20 lbs, pushing, pulling, or other strenuous activity for 6 weeks. Pelvic rest for 6 weeks including no sexual intercourse, tampons, or douching. No driving until you can slam on the brakes without pain or while on narcotic pain medications.    Discharge follow-up: Follow up with primary OB for routine postpartum visit in 2 and 6 weeks, will come to [facility].  Follow up with [facility] for blood pressure monitoring   Wound care: Keep incision clean and dry     Diana Galindo MD, MPH, MS  Obstetrics, Gynecology & Women's Health   Resident, PGY-3  12/01/2024 8:33 AM

## 2024-11-23 LAB
ALBUMIN SERPL BCG-MCNC: 3.2 G/DL (ref 3.5–5.2)
ALP SERPL-CCNC: 134 U/L (ref 40–150)
ALT SERPL W P-5'-P-CCNC: 39 U/L (ref 0–50)
ANION GAP SERPL CALCULATED.3IONS-SCNC: 14 MMOL/L (ref 7–15)
AST SERPL W P-5'-P-CCNC: 29 U/L (ref 0–45)
BILIRUB SERPL-MCNC: 0.2 MG/DL
BUN SERPL-MCNC: 9.4 MG/DL (ref 6–20)
CALCIUM SERPL-MCNC: 7.1 MG/DL (ref 8.8–10.4)
CHLORIDE SERPL-SCNC: 102 MMOL/L (ref 98–107)
CREAT SERPL-MCNC: 0.61 MG/DL (ref 0.51–0.95)
EGFRCR SERPLBLD CKD-EPI 2021: >90 ML/MIN/1.73M2
ERYTHROCYTE [DISTWIDTH] IN BLOOD BY AUTOMATED COUNT: 14.4 % (ref 10–15)
GLUCOSE BLDC GLUCOMTR-MCNC: 114 MG/DL (ref 70–99)
GLUCOSE BLDC GLUCOMTR-MCNC: 116 MG/DL (ref 70–99)
GLUCOSE BLDC GLUCOMTR-MCNC: 120 MG/DL (ref 70–99)
GLUCOSE BLDC GLUCOMTR-MCNC: 124 MG/DL (ref 70–99)
GLUCOSE BLDC GLUCOMTR-MCNC: 140 MG/DL (ref 70–99)
GLUCOSE BLDC GLUCOMTR-MCNC: 147 MG/DL (ref 70–99)
GLUCOSE BLDC GLUCOMTR-MCNC: 151 MG/DL (ref 70–99)
GLUCOSE BLDC GLUCOMTR-MCNC: 161 MG/DL (ref 70–99)
GLUCOSE SERPL-MCNC: 104 MG/DL (ref 70–99)
HCO3 SERPL-SCNC: 18 MMOL/L (ref 22–29)
HCT VFR BLD AUTO: 36.3 % (ref 35–47)
HGB BLD-MCNC: 12.3 G/DL (ref 11.7–15.7)
MCH RBC QN AUTO: 29.3 PG (ref 26.5–33)
MCHC RBC AUTO-ENTMCNC: 33.9 G/DL (ref 31.5–36.5)
MCV RBC AUTO: 86 FL (ref 78–100)
PLATELET # BLD AUTO: 226 10E3/UL (ref 150–450)
POTASSIUM SERPL-SCNC: 3.8 MMOL/L (ref 3.4–5.3)
PROT SERPL-MCNC: 6.7 G/DL (ref 6.4–8.3)
RBC # BLD AUTO: 4.2 10E6/UL (ref 3.8–5.2)
SODIUM SERPL-SCNC: 134 MMOL/L (ref 135–145)
WBC # BLD AUTO: 14.7 10E3/UL (ref 4–11)

## 2024-11-23 PROCEDURE — 36415 COLL VENOUS BLD VENIPUNCTURE: CPT

## 2024-11-23 PROCEDURE — 258N000003 HC RX IP 258 OP 636: Performed by: OBSTETRICS & GYNECOLOGY

## 2024-11-23 PROCEDURE — 250N000012 HC RX MED GY IP 250 OP 636 PS 637

## 2024-11-23 PROCEDURE — 99232 SBSQ HOSP IP/OBS MODERATE 35: CPT | Mod: 25 | Performed by: OBSTETRICS & GYNECOLOGY

## 2024-11-23 PROCEDURE — 250N000013 HC RX MED GY IP 250 OP 250 PS 637

## 2024-11-23 PROCEDURE — 82040 ASSAY OF SERUM ALBUMIN: CPT

## 2024-11-23 PROCEDURE — 120N000002 HC R&B MED SURG/OB UMMC

## 2024-11-23 PROCEDURE — 85018 HEMOGLOBIN: CPT

## 2024-11-23 PROCEDURE — 250N000011 HC RX IP 250 OP 636

## 2024-11-23 PROCEDURE — 59025 FETAL NON-STRESS TEST: CPT | Mod: 26 | Performed by: OBSTETRICS & GYNECOLOGY

## 2024-11-23 PROCEDURE — 82947 ASSAY GLUCOSE BLOOD QUANT: CPT

## 2024-11-23 RX ORDER — SODIUM CHLORIDE, SODIUM LACTATE, POTASSIUM CHLORIDE, CALCIUM CHLORIDE 600; 310; 30; 20 MG/100ML; MG/100ML; MG/100ML; MG/100ML
INJECTION, SOLUTION INTRAVENOUS
Status: COMPLETED
Start: 2024-11-23 | End: 2024-11-23

## 2024-11-23 RX ORDER — LABETALOL 200 MG/1
200 TABLET, FILM COATED ORAL 3 TIMES DAILY
Status: DISCONTINUED | OUTPATIENT
Start: 2024-11-23 | End: 2024-11-24

## 2024-11-23 RX ORDER — NIFEDIPINE 30 MG/1
30 TABLET, EXTENDED RELEASE ORAL EVERY 24 HOURS
Status: DISCONTINUED | OUTPATIENT
Start: 2024-11-23 | End: 2024-11-29

## 2024-11-23 RX ORDER — NIFEDIPINE 30 MG/1
60 TABLET, EXTENDED RELEASE ORAL EVERY 24 HOURS
Status: DISCONTINUED | OUTPATIENT
Start: 2024-11-23 | End: 2024-11-28

## 2024-11-23 RX ADMIN — DOCUSATE SODIUM 100 MG: 100 CAPSULE, LIQUID FILLED ORAL at 19:58

## 2024-11-23 RX ADMIN — LABETALOL HYDROCHLORIDE 200 MG: 200 TABLET, FILM COATED ORAL at 14:03

## 2024-11-23 RX ADMIN — LABETALOL HYDROCHLORIDE 200 MG: 200 TABLET, FILM COATED ORAL at 09:27

## 2024-11-23 RX ADMIN — INSULIN ASPART 2 UNITS: 100 INJECTION, SOLUTION INTRAVENOUS; SUBCUTANEOUS at 19:11

## 2024-11-23 RX ADMIN — ACETAMINOPHEN 975 MG: 325 TABLET, FILM COATED ORAL at 09:02

## 2024-11-23 RX ADMIN — SODIUM CHLORIDE, POTASSIUM CHLORIDE, SODIUM LACTATE AND CALCIUM CHLORIDE 50 ML/HR: 600; 310; 30; 20 INJECTION, SOLUTION INTRAVENOUS at 07:36

## 2024-11-23 RX ADMIN — MAGNESIUM SULFATE HEPTAHYDRATE 2 G/HR: 40 INJECTION, SOLUTION INTRAVENOUS at 07:35

## 2024-11-23 RX ADMIN — LABETALOL HYDROCHLORIDE 200 MG: 200 TABLET, FILM COATED ORAL at 19:58

## 2024-11-23 RX ADMIN — INSULIN ASPART 1 UNITS: 100 INJECTION, SOLUTION INTRAVENOUS; SUBCUTANEOUS at 14:06

## 2024-11-23 RX ADMIN — DOCUSATE SODIUM 100 MG: 100 CAPSULE, LIQUID FILLED ORAL at 09:02

## 2024-11-23 RX ADMIN — NIFEDIPINE 60 MG: 30 TABLET, FILM COATED, EXTENDED RELEASE ORAL at 17:48

## 2024-11-23 RX ADMIN — PRENATAL VIT W/ FE FUMARATE-FA TAB 27-0.8 MG 1 TABLET: 27-0.8 TAB at 09:03

## 2024-11-23 RX ADMIN — ACETAMINOPHEN 975 MG: 325 TABLET, FILM COATED ORAL at 17:48

## 2024-11-23 RX ADMIN — ASPIRIN 81 MG CHEWABLE TABLET 81 MG: 81 TABLET CHEWABLE at 09:03

## 2024-11-23 RX ADMIN — NIFEDIPINE 30 MG: 30 TABLET, FILM COATED, EXTENDED RELEASE ORAL at 05:16

## 2024-11-23 RX ADMIN — DIPHENHYDRAMINE HYDROCHLORIDE 25 MG: 50 INJECTION INTRAMUSCULAR; INTRAVENOUS at 01:07

## 2024-11-23 RX ADMIN — INSULIN ASPART 1 UNITS: 100 INJECTION, SOLUTION INTRAVENOUS; SUBCUTANEOUS at 07:48

## 2024-11-23 RX ADMIN — LEVOTHYROXINE SODIUM 112 MCG: 112 TABLET ORAL at 09:02

## 2024-11-23 RX ADMIN — METOCLOPRAMIDE HYDROCHLORIDE 10 MG: 5 INJECTION INTRAMUSCULAR; INTRAVENOUS at 01:07

## 2024-11-23 RX ADMIN — INSULIN HUMAN 8 UNITS: 100 INJECTION, SUSPENSION SUBCUTANEOUS at 23:00

## 2024-11-23 NOTE — PROGRESS NOTES
Antepartum Progress Note    November 223, 2024  Ravi Peñaloza  [id number]      HPI     Patient reporting doing otherwise well this morning.  States her headache is stable however slightly reduced to 4/10.  Reports headache starting in lower neck and radiating to back of head.  Patient additionally reporting feeling very lethargic and tired.  Denies right upper quadrant pain, denies changes in vision, denies shortness of breath.  Denies vaginal bleeding, loss of fluid.  Reports good fetal movement    Physical Exam     Vitals:    11/23/24 0925 11/23/24 0930 11/23/24 0935 11/23/24 1035   BP: (!) 154/91   133/84   BP Location:       Patient Position:       Cuff Size:       Resp:       Temp:       TempSrc:       SpO2: 100% 100% 100%    Weight:         General: alert, oriented female, resting in bed in NAD  CV: regular rate and rhythm, normal s1 and s2, no murmurs  Lungs: clear bilaterally, no crackles or wheezes  Abdomen: soft, gravid, non-tender  Extremities: bilateral lower extremities non-tender with no edema    FHT: baseline 120, moderate variability, accelerations +, no decelerations  [place]: quiet  reactive     Labs     Recent Results (from the past 12 hours)   Glucose by meter    Collection Time: 11/23/24  2:02 AM   Result Value Ref Range    GLUCOSE BY METER POCT 116 (H) 70 - 99 mg/dL   CBC with platelets    Collection Time: 11/23/24  7:44 AM   Result Value Ref Range    WBC Count 14.7 (H) 4.0 - 11.0 10e3/uL    RBC Count 4.20 3.80 - 5.20 10e6/uL    Hemoglobin 12.3 11.7 - 15.7 g/dL    Hematocrit 36.3 35.0 - 47.0 %    MCV 86 78 - 100 fL    MCH 29.3 26.5 - 33.0 pg    MCHC 33.9 31.5 - 36.5 g/dL    RDW 14.4 10.0 - 15.0 %    Platelet Count 226 150 - 450 10e3/uL   Comprehensive metabolic panel    Collection Time: 11/23/24  7:44 AM   Result Value Ref Range    Sodium 134 (L) 135 - 145 mmol/L    Potassium 3.8 3.4 - 5.3 mmol/L    Carbon Dioxide (CO2) 18 (L) 22 - 29 mmol/L    Anion Gap 14 7 - 15 mmol/L    Urea Nitrogen 9.4  6.0 - 20.0 mg/dL    Creatinine 0.61 0.51 - 0.95 mg/dL    GFR Estimate >90 >60 mL/min/1.73m2    Calcium 7.1 (L) 8.8 - 10.4 mg/dL    Chloride 102 98 - 107 mmol/L    Glucose 104 (H) 70 - 99 mg/dL    Alkaline Phosphatase 134 40 - 150 U/L    AST 29 0 - 45 U/L    ALT 39 0 - 50 U/L    Protein Total 6.7 6.4 - 8.3 g/dL    Albumin 3.2 (L) 3.5 - 5.2 g/dL    Bilirubin Total 0.2 <=1.2 mg/dL   Glucose by meter    Collection Time: 11/23/24  9:06 AM   Result Value Ref Range    GLUCOSE BY METER POCT 114 (H) 70 - 99 mg/dL      Imaging     [facility] US OB Limited 11/22:  METHOD  ---------------------------------------------------------------------------------------------------------  Transabdominal ultrasound examination. View: Sufficient        PREGNANCY  ---------------------------------------------------------------------------------------------------------  Monge pregnancy. Number of fetuses: 1        DATING  ---------------------------------------------------------------------------------------------------------                                           Date                                Details                                                                                      Gest. age                      JARED  LMP                                  4/4/2024                                                                                                                           33 w + 1 d                     1/9/2025  Previous U/S                      5/30/2024                        GA, GA 7 w + 6 d                                                                       33 w + 0 d                     1/10/2025  Assigned dating                  based on the LMP, selected on 10/30/2024                                                                         33 w + 1 d                     1/9/2025        GENERAL  EVALUATION  ---------------------------------------------------------------------------------------------------------  Cardiac activity present.  bpm. Fetal movements: visualized. Presentation: cephalic  Placenta: Right lateral, No Previa, > 2 cm from internal os.  Umbilical cord: 3 vessel cord  Amniotic fluid: Amount of AF: normal. MVP 3.5 cm        FETAL DOPPLER  ---------------------------------------------------------------------------------------------------------  Umbilical Artery: normal  PI                                                             1.04                                                  78%                             Omar  HR                                                           143                     bpm        MATERNAL STRUCTURES  ---------------------------------------------------------------------------------------------------------  Right Ovary                          Not examined  Left Ovary                            Not examined        NON STRESS TEST  ---------------------------------------------------------------------------------------------------------  NST interpretation: reactive. Test duration 36 min. Baseline  bpm. Baseline variability: moderate. Accelerations: present. Decelerations: absent. Uterine activity:  absent. CTG category: normal/reassuring. Acoustic stimulation: no        RECOMMENDATION  ---------------------------------------------------------------------------------------------------------  Continue  surveillance with fetal growth scans every 3 weeks and fetal surveillance with twice weekly modified BPP and UA Doppler waveform assessment. We  presume this will be done in your office. If you would prefer future ultrasound be done in the Maternal-Fetal Medicine clinic, please let us know.     BP was 155/101 followed by 168/108 mmHg and 156/99 mmHg.     PAtient referred to [facility] and [facility] for assessment of BP. Recommend delivery by 34 weeks  if severe range BP confirmed. If no recurrent severe range BP, can continue with  outpatient surveillance with delivery for diagnosis fo severe range BP, preeclampsia or by 37 weeks.     Thank you for the opportunity to participate in the care of this patient. If you have questions regarding today's evaluation or if we can be of further service, please contact the  M[facility] Medicine Center.     **Fetal anomalies may be present but not detected**                                                                     IMPRESSION  ---------------------------------------------------------------------------------------------------------  Patient is here for antepartum testing secondary to AMA, FGR, BMI > 35 kg/m2, GHTN.  1) Intrauterine pregnancy at 33 1/7 weeks gestational age.  2) The NST (performed for FGR) is reassuring.  3) The amniotic fluid volume appears normal.  4) The UAR Doppler waveform (performed for FGR) is normal.    [facility] BPP SINGLE :    IMPRESSION  ---------------------------------------------------------------------------------------------------------  1. Monge pregnancy at 33w 0d gestational age.  2. The amniotic fluid volume appeared normal.  3. The BPP was 8/10 (non reactive NST).  4. Transverse presentation.     Assessment/Plan     Ravi Peñaloza is a 44 year old  at 33w2d by LMP c/w 7w6d US, admitted in setting of preeclampsia with severe features (BP).     # Preeclampsia w/ SF (BP, HA)  Discussed at length diagnosis of preeclampsia with severe features, need for blood pressure control, and contraindications to expectant management.  At this time patient is stable enough to continue to 34 weeks.  Will tentatively schedule IOL at 34 weeks 0 days per patient preference.  Discussed potential risk of needing  section during induction of labor, patient would like to still proceed with plan.  - Serial BP monitoring, q4h BP checks  - BPs: high mild range  - Antihypertensives: IV  antihypertensives PRN for sustained severe range blood pressures (SBP>160, DBP>110 sustained over 15 minutes)  - D#1 PO Nifedipine ER 30/60, D#1 Labetalol 200 TID  - s/p Mag 24 hours, restart at time of IOL  - Labs: HELLP labs WNL, UPC 0.17 on  > 0.42 today.  - Daily weights, strict I&Os  - Discussed delivery timing at 34w0d unless indicated sooner  - s/p NICU  - continue PTA aspirin 81 mg    # Gestational Diabetes A2    - mSSI, NPH 8u HS  - Blood Glucose Checks: Fasting, TID AC, PP, at bedtime   - 2X weekly BPP     # Severe Fetal Growth Restriction  - N[place]um  EFW 1% (1483g), AC <1%. UA dopplers with normal flow.  -plan for 2X weekly UA doppler      # Hypothyroidism   - PTA levothyroxine 112mcg PO qdaily  - TSH 0.823 10/20/24     Inpatient Management  - up ad gabo  - Regular diet  - SCD's and prophylactic Lovenox BID  - Q72 hour CBC type/screen     # FWB  - BID NSTs for fetal monitoring   - Ultrasound surveillance plan: BPP 2X weekly M/R and UA doppler 2X weekly   - s/p BMZ for fetal lung maturity (-)   - s/p Mag  - s/p NICU consult     Routine prenatal care  - Rh positive; Antibody negative  - Rubella immune, Hepatitis B NR, Hepatitis C NR, HIV NR, RPR negative (1st trimester and on admission), GBS negative    - GC/CT negative  - GCT - Patient dx with GDMA2    - Other prenatal labs wnl   - Immunizations: Flu declined, TDAP 2024   - Pap  normal   - Contraception: Need to discuss  - Feeding:  Need to discuss    Delivery Plan:    - MOD: Induction of labor for vaginal delivery preferred, pending clinical course and fetal lie. Last was cephalic in the office this morning but will recheck. Consented for  section  in the case that vaginal delivery is no longer a safe option for Ravi or baby.   - TOD:  34 week IOL    # PPH Risk:  - Medium, 12cm fibroid (no prior uterine incisions, sarmiento, <5 prior vaginal deliveries, no h/o pph or bleeding disorder), Type and screen ordered      Patient seen and care plan discussed with   Dr. Kala Cloud.    Marcello Esparza DO MA  [facility]- PGY3  11:02 AM 2024     Physician Attestation   I saw this patient with the resident and agree with the resident/fellow's findings and plan of care as documented in the note.      Key findings: 44 year old  at 33w2d by LMP c/w 7w6d US, admitted in setting of preeclampsia with severe features (BP) and FGR. LFTs stable this AM.  Blood pressures upper mild range, will add labetalol 200 mg TID. We are starting labetalol due to concern that nifedipine may be contributing to headache.  Will stop mag this AM since suspect may be contributing to ongoing HA.  Will also try tylenol to treat HA  At this time, goal is to continue pregnancy to 34 weeks, knowing we may need to recommend birth earlier     Kala Cloud MD  Date of Service (when I saw the patient): 24     I spent a total of 35 minutes with the patient during today's office visit. Over 50% of this time was spent counseling the patient and/or coordinating care regarding counseling for multiple medical comorbidities. See note for details; I have made the necessary edits/additions.

## 2024-11-23 NOTE — PROGRESS NOTES
[facility]  Magnesium Check Note    S:   Feeling okay. Headache still present, 3-4/10. Neck pain still present but improved after robaxin, 2/10. No vision changes, chest pain, shortness of breath, RUQ or epigastric pain.    O:  Patient Vitals for the past 4 hrs:   BP SpO2   24 0434 (!) 158/94 100 %   24 0414 (!) 167/94 99 %   24 0302 (!) 152/90 --   24 0259 -- 100 %   24 0200 (!) 147/87 --   24 0159 -- 100 %   Gen: Resting comfortably in bed  CV:  RRR, soft systolic ejection murmur present  Pulm:  CTAB, no wheezes or crackles  Abd:  Gravid, soft, non-tender  Ext:  Patellar reflexes 2+ bilaterally, brachioradialis reflexes 2+, no clonus bilaterally    FHT: Baseline 130, moderate variability, + accelerations, no decelerations  [place]: Rare isolated contractions, about 1 in 10-20 mins when they occur    I/O:  I/O last 3 completed shifts:  In: 2251 [P.O.:1376; I.V.:875]  Out: 3560 [Urine:3560]    A/P:  Ravi Peñaloza is a 44 year old  at 33w2d by LMP c/w 7w6d US, admitted in setting of preeclampsia with severe features (BP).    Preeclampsia with severe features (BP)  Started on nifedipine 30mg the afternoon of HD#1 without much improvement in BP. Due to ongoing HMR BP into the evening, added additional 30mg just before midnight. BP has remained MR since then, with a non-sustained SRBP at 0414. Added a third dose of 30mg this morning to try to avoid SSR BP requiring IV antihypertensive -- patient is now D#1 nifed  after a fast uptitration in <24h.  Given ongoing elevated BP with minimal response to antihypertensives, and ongoing headache with only marginal improvement with tylenol and reglan/benadryl, discussed with patient and her partner my concern that preeclampsia is worsening, and that she could develop ongoing SSR BP today, or other sequelae of preeclampsia (pulmonary edema, worsening liver/kidney function, etc). Recommended she stay NPO until  evaluation by day team this morning, and that she anticipate possible recommendation for delivery this morning pending BP and symptom management. She and her partner expressed understanding.   - NPO now, last ate at 7-8pm  - s/p IV hydralazine 10 ( 1208)  - UOP: 800cc over last 5 hrs, adequate. Strict Is/Os.   - Symptoms: Headache, improved with reglan/benadryl; neck pain, improved robaxin  - Preeclampsia labs nl (), next in AM  - Magnesium sulfate for seizure prophylaxis: 4g bolus (1233)> 2g/hr  - Next clinical mag check at 0900    Antepartum Hospital Admission  Fetal presentation cephalic on last US . Have discussed possible need for , patient has given informed consent for CS with possible classical hysterotomy; see prior documentation. Chronic medical problems as below.  - GDMA2 - NPH 8u qhs, SSI AC  - Hypothyroid - PTA synthroid 112 mcg    FWB  - EFM reactive and reassuring for gestational age.  - sFGR - US  EFW 1% (1483g), AC <1%  - Continuous EFM, [place] while on magnesium     Desiree Bass MD  Obstetrics & Gynecology, PGY-3  2024 5:24 AM

## 2024-11-23 NOTE — PLAN OF CARE
Pre-Eclampsia Shift Note  Data: Blood pressures  elevated .   Vitals:    11/22/24 1540 11/22/24 1610 11/22/24 1640 11/22/24 1710   BP: (!) 152/95 (!) 147/85 133/71 134/71   BP Location:       Patient Position:       Cuff Size:       Resp:       Temp:       TempSrc:       SpO2: 99% 100% 100% 100%   Weight:       .   Vitals:    11/22/24 1413   Weight: 88.4 kg (194 lb 14.4 oz)   .   I/O this shift:  In: -   Out: 730 [Urine:730].   Signs and symptoms of pre-eclampsia Present and include: headache. Fetal assessment Appropriate for Gestational Age.  Pre-eclampsia labs Previously done, results completed.  Interventions: Monitor blood pressures and indicators of pre-eclampsia every 4 hours.  Medications: nifedipine and magnesium sulfate twice daily.   Continue uterine/fetal assessment continuous. Activity level Advance activity as tolerated. Preventive measures include Medications and Frequent voiding. Encourage active range of motion and frequent position changes.  Plan: Continue expectant management. Observe for and notify care provider of indicators of worsening pre-eclampsia or signs of fetal/maternal compromise.

## 2024-11-23 NOTE — PROVIDER NOTIFICATION
11/23/24 1032   Provider Notification   Provider Name/Title Dr. Esparza   Method of Notification Electronic Page   Notification Reason Other (Comment)  (BP checks)     RN contacted provider to ask if q4h BP checks appropriate now that magnesium sulfate is off and pt hasn't had a BP over 160/110 since 0415. Plan per provider to check q1h x1 then q2h x2 the q4h if all BPs still under treatable range. Pt agrees with plan

## 2024-11-23 NOTE — PLAN OF CARE
Data: VSS, blood pressures are elevated (borderline to severe range blood pressures). Pt afebrile. Pt denies dizziness, changes to vision or epigastric pain. Pt c/o headache rated 6/10 and left-sided neck pain rated 4/10. Bilateral DTRs are 2+, no beats of clonus present. Trace edema present. Fetal assessment is appropriate for gestational age (see flowsheet). [place] reveals occasional contractions, patient denies feeling contractions or pain. Pt denies vaginal bleeding or discharge or difficulty breathing. Pt reports active fetal movement.   Action: Magnesium sulfate infusing at 2 g/hr. Nifedipine 30 mg given x 2 during the night to attempt to manage borderline blood pressures per provider orders. Several medications given to manage pain: Tylenol, Rybaxin, Reglan and Benadryl. Patient encouraged to move extremities while in bed.  Response: Pain medications reduced neck pain from 4/10 to 2/10 and reduced headache from 6/10 to 4/10. Continue with plan of care, which is light activity, continuous monitoring, strict I&O, Q 1 hour BP, HR, RR and SpO2 monitoring. Patient coping well. Plan made to make patient NPO until the day provider team could discuss this patient's blood pressures and make a plan for delivery. Continue to monitor and intervene for signs and symptoms of preeclampsia and/or maternal/fetal compromise.

## 2024-11-23 NOTE — PROVIDER NOTIFICATION
11/23/24 0052   Provider Notification   Provider Name/Title Dr. Bass, G3   Method of Notification Electronic Page   Request Evaluate - Remote   Notification Reason Decels;Uterine Activity;Pain  (This RN requested a review of the FHR tracing, contraction pattern.)     This RN requested a review of the FHR tracing so as to make the provider aware of a few spontaneous decelerations that occurred (see strip and flowsheets). FHR continues to have a baseline of 145 bpm, moderate variability, accels present.  Pt continues to contract sporadically, pt denies feeling contractions. Patient is now rating her headache a 6/10 and her neck pain a 4/10.  notified that IV Reglan and Benadryl would be administered per her plan for pain control for this patient.

## 2024-11-23 NOTE — PROGRESS NOTES
[facility]  Magnesium Check Note    S:   Feeling okay. Thinks headache improved with tylenol, however has ongoing muscle pain/tension/soreness in left side of neck/trapezius. No vision changes, spots in vision, chest pain, shortness of breath, RUQ or epigastric pain.    O:  Patient Vitals for the past 4 hrs:   BP Temp Temp src Resp SpO2   24 (!) 153/90 -- -- 18 --   24 -- -- -- -- 99 %   24 (!) 149/86 97.5  F (36.4  C) Oral 18 --   24 -- -- -- -- 99 %   24 (!) 153/93 -- -- 18 --   24 -- -- -- -- 100 %     Gen: Resting comfortably in bed  CV:  RRR, soft systolic ejection murmur present  Pulm:  CTAB, no wheezes or crackles  Abd:  Gravid, soft, non-tender  Ext:  Patellar reflexes 2+ bilaterally, brachioradialis reflexes 2+    FHT: Baseline 130, moderate variability, + accelerations, no decelerations  [place]: Rare isolated contractions, about 1 in 10-20 mins when they occur    I/O:  I/O last 3 completed shifts:  In: -   Out: 550 [Urine:550]    A/P:  Ravi Peñaloza is a 44 year old  at 33w1d by LMP c/w 7w6d US, admitted in setting of preeclampsia with severe features (BP).    Preeclampsia with severe features (BP)  - s/p IV hydralazine 10 ( 1208)  - D#1 nifed 30  - BP: HMR on last check, will titrate nifedipine prn  - UOP: 6.26 cc/hr over last 2 hours. Strict Is/Os.   - Symptoms: Headache, improved with tylenol; neck pain, switching from flexeril to robaxin  - Preeclampsia labs nl (), next in AM  - Magnesium sulfate for seizure prophylaxis: 4g bolus (1233)> 2g/hr  - Next clinical mag check at 0230    Antepartum Hospital Admission  Fetal presentation cephalic on last US . Have discussed possible need for , patient has given informed consent for CS with possible classical hysterotomy; see prior documentation. Chronic medical problems as below.  - GDMA2 - NPH 8u qhs, SSI AC  - Hypothyroid - PTA synthroid  112 mcg    FWB  - EFM reactive and reassuring for gestational age.  - sFGR - US 11/20 EFW 1% (1483g), AC <1%  - Continuous EFM, [place] while on magnesium     Desiree Bass MD  Obstetrics & Gynecology, PGY-3  11/22/2024 10:36 PM

## 2024-11-24 PROBLEM — Z36.89 ENCOUNTER FOR TRIAGE IN PREGNANT PATIENT: Status: RESOLVED | Noted: 2024-11-18 | Resolved: 2024-11-24

## 2024-11-24 PROBLEM — O13.9 GESTATIONAL HYPERTENSION: Status: RESOLVED | Noted: 2024-11-18 | Resolved: 2024-11-24

## 2024-11-24 LAB
ALBUMIN SERPL BCG-MCNC: 2.9 G/DL (ref 3.5–5.2)
ALP SERPL-CCNC: 140 U/L (ref 40–150)
ALT SERPL W P-5'-P-CCNC: 40 U/L (ref 0–50)
ANION GAP SERPL CALCULATED.3IONS-SCNC: 13 MMOL/L (ref 7–15)
AST SERPL W P-5'-P-CCNC: 27 U/L (ref 0–45)
BILIRUB SERPL-MCNC: 0.2 MG/DL
BUN SERPL-MCNC: 7.4 MG/DL (ref 6–20)
CALCIUM SERPL-MCNC: 8.7 MG/DL (ref 8.8–10.4)
CHLORIDE SERPL-SCNC: 105 MMOL/L (ref 98–107)
CREAT SERPL-MCNC: 0.62 MG/DL (ref 0.51–0.95)
EGFRCR SERPLBLD CKD-EPI 2021: >90 ML/MIN/1.73M2
ERYTHROCYTE [DISTWIDTH] IN BLOOD BY AUTOMATED COUNT: 14.4 % (ref 10–15)
GLUCOSE BLDC GLUCOMTR-MCNC: 110 MG/DL (ref 70–99)
GLUCOSE BLDC GLUCOMTR-MCNC: 111 MG/DL (ref 70–99)
GLUCOSE BLDC GLUCOMTR-MCNC: 116 MG/DL (ref 70–99)
GLUCOSE BLDC GLUCOMTR-MCNC: 117 MG/DL (ref 70–99)
GLUCOSE BLDC GLUCOMTR-MCNC: 140 MG/DL (ref 70–99)
GLUCOSE BLDC GLUCOMTR-MCNC: 144 MG/DL (ref 70–99)
GLUCOSE BLDC GLUCOMTR-MCNC: 97 MG/DL (ref 70–99)
GLUCOSE SERPL-MCNC: 92 MG/DL (ref 70–99)
HCO3 SERPL-SCNC: 18 MMOL/L (ref 22–29)
HCT VFR BLD AUTO: 35.4 % (ref 35–47)
HGB BLD-MCNC: 11.9 G/DL (ref 11.7–15.7)
MCH RBC QN AUTO: 29.2 PG (ref 26.5–33)
MCHC RBC AUTO-ENTMCNC: 33.6 G/DL (ref 31.5–36.5)
MCV RBC AUTO: 87 FL (ref 78–100)
PLATELET # BLD AUTO: 200 10E3/UL (ref 150–450)
POTASSIUM SERPL-SCNC: 4.1 MMOL/L (ref 3.4–5.3)
PROT SERPL-MCNC: 6.2 G/DL (ref 6.4–8.3)
RBC # BLD AUTO: 4.07 10E6/UL (ref 3.8–5.2)
SODIUM SERPL-SCNC: 136 MMOL/L (ref 135–145)
WBC # BLD AUTO: 11.7 10E3/UL (ref 4–11)

## 2024-11-24 PROCEDURE — 80048 BASIC METABOLIC PNL TOTAL CA: CPT

## 2024-11-24 PROCEDURE — 82040 ASSAY OF SERUM ALBUMIN: CPT

## 2024-11-24 PROCEDURE — 99232 SBSQ HOSP IP/OBS MODERATE 35: CPT | Mod: 25 | Performed by: OBSTETRICS & GYNECOLOGY

## 2024-11-24 PROCEDURE — 120N000002 HC R&B MED SURG/OB UMMC

## 2024-11-24 PROCEDURE — 82247 BILIRUBIN TOTAL: CPT

## 2024-11-24 PROCEDURE — 59025 FETAL NON-STRESS TEST: CPT | Mod: 26 | Performed by: OBSTETRICS & GYNECOLOGY

## 2024-11-24 PROCEDURE — 36415 COLL VENOUS BLD VENIPUNCTURE: CPT

## 2024-11-24 PROCEDURE — 250N000013 HC RX MED GY IP 250 OP 250 PS 637

## 2024-11-24 PROCEDURE — 85027 COMPLETE CBC AUTOMATED: CPT

## 2024-11-24 RX ORDER — LABETALOL 300 MG/1
300 TABLET, FILM COATED ORAL 3 TIMES DAILY
Status: DISCONTINUED | OUTPATIENT
Start: 2024-11-24 | End: 2024-11-25

## 2024-11-24 RX ORDER — LEVOTHYROXINE SODIUM 112 UG/1
112 TABLET ORAL EVERY 24 HOURS
Status: DISCONTINUED | OUTPATIENT
Start: 2024-11-24 | End: 2024-12-01 | Stop reason: HOSPADM

## 2024-11-24 RX ADMIN — PRENATAL VIT W/ FE FUMARATE-FA TAB 27-0.8 MG 1 TABLET: 27-0.8 TAB at 10:20

## 2024-11-24 RX ADMIN — NIFEDIPINE 60 MG: 30 TABLET, FILM COATED, EXTENDED RELEASE ORAL at 18:06

## 2024-11-24 RX ADMIN — ACETAMINOPHEN 975 MG: 325 TABLET, FILM COATED ORAL at 21:16

## 2024-11-24 RX ADMIN — NIFEDIPINE 30 MG: 30 TABLET, FILM COATED, EXTENDED RELEASE ORAL at 06:48

## 2024-11-24 RX ADMIN — LABETALOL HYDROCHLORIDE 200 MG: 200 TABLET, FILM COATED ORAL at 14:08

## 2024-11-24 RX ADMIN — ACETAMINOPHEN 975 MG: 325 TABLET, FILM COATED ORAL at 08:25

## 2024-11-24 RX ADMIN — DOCUSATE SODIUM 100 MG: 100 CAPSULE, LIQUID FILLED ORAL at 20:16

## 2024-11-24 RX ADMIN — LEVOTHYROXINE SODIUM 112 MCG: 112 TABLET ORAL at 05:28

## 2024-11-24 RX ADMIN — ACETAMINOPHEN 975 MG: 325 TABLET, FILM COATED ORAL at 14:10

## 2024-11-24 RX ADMIN — INSULIN ASPART 1 UNITS: 100 INJECTION, SOLUTION INTRAVENOUS; SUBCUTANEOUS at 09:23

## 2024-11-24 RX ADMIN — DOCUSATE SODIUM 100 MG: 100 CAPSULE, LIQUID FILLED ORAL at 10:20

## 2024-11-24 RX ADMIN — ASPIRIN 81 MG CHEWABLE TABLET 81 MG: 81 TABLET CHEWABLE at 10:20

## 2024-11-24 RX ADMIN — LABETALOL HYDROCHLORIDE 200 MG: 200 TABLET, FILM COATED ORAL at 08:25

## 2024-11-24 RX ADMIN — LABETALOL HYDROCHLORIDE 300 MG: 300 TABLET, FILM COATED ORAL at 20:16

## 2024-11-24 NOTE — PLAN OF CARE
Pre-Eclampsia Shift Note  Data: Blood pressures within parameters.   .   I/O this shift:  In: 1700 [P.O.:1700]  Out: 900 [Urine:900].   Signs and symptoms of pre-eclampsia Present and include: headache. Pt declined pain medication at this time. Levothyroxine time adjusted to 0530 per pt request. Dr. Esparza notified and order modified. Fetal assessment Appropriate for Gestational Age. Pre-eclampsia labs Previously done, results active.  Interventions: Monitor blood pressures and indicators of pre-eclampsia every 4 hours.  Medications: labetalol TID and nifedipine twice daily.   Continue uterine/fetal assessment BID. Activity level Regular activity. Preventive measures include Medications, Positioning, and Frequent voiding. Encourage active range of motion and frequent position changes.  Plan: Continue expectant management. Observe for and notify care provider of indicators of worsening pre-eclampsia or signs of fetal/maternal compromise. Report to Cornel BENEDICT RN.

## 2024-11-24 NOTE — PLAN OF CARE
Pre-Eclampsia Shift Note  Data: Blood pressures  elevated but not in severe range .   Vitals:    11/23/24 1330 11/23/24 1345 11/23/24 1400 11/23/24 1740   BP:   (!) 144/89 (!) 142/84   BP Location:   Left arm    Patient Position:   Semi-Acevedo's    Cuff Size:   Adult Regular    Resp:   16    Temp:   98.1  F (36.7  C)    TempSrc:   Oral    SpO2: 99% 99% 99% 99%   Weight:       .   Vitals:    11/22/24 1413 11/23/24 0720   Weight: 88.4 kg (194 lb 14.4 oz) 88 kg (194 lb 1.6 oz)   .   I/O this shift:  In: 400 [P.O.:400]  Out: 300 [Urine:300].   Signs and symptoms of pre-eclampsia Present and include: headache and elevated BPs. Fetal assessment Appropriate for Gestational Age. Pre-eclampsia labs Ordered, results active  Interventions: Monitor blood pressures and indicators of pre-eclampsia every 4 hours.  Medications: labetalol TID and nifedipine twice daily. Headache manageable with magnesium sulfate turned off and tylenol PRN. Pt's neck is still stiff, states she is coping fine with it and declines medication in addition to tylenol.  Continue uterine/fetal assessment twice daily. Activity level Advance activity as tolerated. Preventive measures include Medications. Encourage active range of motion and frequent position changes.  Plan: Continue expectant management. Observe for and notify care provider of indicators of worsening pre-eclampsia or signs of fetal/maternal compromise.

## 2024-11-24 NOTE — PROVIDER NOTIFICATION
11/23/24 2146   Provider Notification   Provider Name/Title Dr. Esparza   Method of Notification In Department   Request Evaluate - Remote   Notification Reason Other (Comment)     Pt reports taking NPH at bedtime, med not order. Dr. Esparza notified and placed order.

## 2024-11-24 NOTE — PROVIDER NOTIFICATION
11/23/24 0842   Provider Notification   Provider Name/Title Dr. Cloud, Dr. Esparza, [facility] team   Method of Notification At Bedside   Notification Reason Other (Comment)  (MFM rounds)     Providers at bedside for morning rounds. Plan per providers to discontinue magnesium sulfate, start labetalol TID and nifedipine BID. Pt ok to come off continuous EFM and switch to BID fetal monitoring. Pt agreed with plan of care.

## 2024-11-24 NOTE — PROGRESS NOTES
Antepartum Progress Note    November 223, 2024  Ravi Peñaloza  [id number]      HPI     Patient reporting doing otherwise well this morning.  Feeling better since stopping magnesium.  Notes that her HA feels more like her migraine, which she usually treats with sumitriptan outside of pregnancy.  She is typically a black tea drinker, but hasn't had any today.  Denies right upper quadrant pain, denies changes in vision, denies shortness of breath.  Denies vaginal bleeding, loss of fluid.  Reports good fetal movement    Physical Exam     Vitals:    11/23/24 1958 11/24/24 0008 11/24/24 0515 11/24/24 0815   BP: (!) 146/89 138/84 137/85    BP Location: Left arm Left arm Left arm    Patient Position: Semi-Acevedo's Semi-Acevedo's Right side    Cuff Size: Adult Regular Adult Regular Adult Regular    Pulse: 80      Resp: 16 16 16    Temp: 98.3  F (36.8  C) 97.9  F (36.6  C) 98  F (36.7  C)    TempSrc: Oral Oral Oral    SpO2:       Weight:    85.9 kg (189 lb 6.4 oz)     General: alert, oriented female, walking in room   CV: well perfused   Lungs: easy respiratory effort  Abdomen: soft, gravid, non-tender  Extremities: bilateral lower extremities non-tender with no edema    FHT: baseline 140, moderate variability, accelerations +, no decelerations  [place]: quiet  reactive     Labs     Recent Results (from the past 12 hours)   Glucose by meter    Collection Time: 11/23/24  9:39 PM   Result Value Ref Range    GLUCOSE BY METER POCT 140 (H) 70 - 99 mg/dL   Glucose by meter    Collection Time: 11/24/24  5:23 AM   Result Value Ref Range    GLUCOSE BY METER POCT 97 70 - 99 mg/dL      Imaging     [facility] 11/22:  METHOD  ---------------------------------------------------------------------------------------------------------  Transabdominal ultrasound examination. View: Sufficient        PREGNANCY  ---------------------------------------------------------------------------------------------------------  [place] pregnancy.  Number of fetuses: 1        DATING  ---------------------------------------------------------------------------------------------------------                                           Date                                Details                                                                                      Gest. age                      JARED  LMP                                  4/4/2024                                                                                                                           33 w + 1 d                     1/9/2025  Previous U/S                      5/30/2024                        GA, GA 7 w + 6 d                                                                       33 w + 0 d                     1/10/2025  Assigned dating                  based on the LMP, selected on 10/30/2024                                                                         33 w + 1 d                     1/9/2025        GENERAL EVALUATION  ---------------------------------------------------------------------------------------------------------  Cardiac activity present.  bpm. Fetal movements: visualized. Presentation: cephalic  Placenta: Right lateral, No Previa, > 2 cm from internal os.  Umbilical cord: 3 vessel cord  Amniotic fluid: Amount of AF: normal. MVP 3.5 cm        FETAL DOPPLER  ---------------------------------------------------------------------------------------------------------  Umbilical Artery: normal  PI                                                             1.04                                                  78%                             [place]  HR                                                           143                     bpm        MATERNAL STRUCTURES  ---------------------------------------------------------------------------------------------------------  Right Ovary                          Not examined  Left Ovary                            Not examined         NON STRESS TEST  ---------------------------------------------------------------------------------------------------------  NST interpretation: reactive. Test duration 36 min. Baseline  bpm. Baseline variability: moderate. Accelerations: present. Decelerations: absent. Uterine activity:  absent. CTG category: normal/reassuring. Acoustic stimulation: no        RECOMMENDATION  ---------------------------------------------------------------------------------------------------------  Continue  surveillance with fetal growth scans every 3 weeks and fetal surveillance with twice weekly modified BPP and UA Doppler waveform assessment. We  presume this will be done in your office. If you would prefer future ultrasound be done in the Maternal-Fetal Medicine clinic, please let us know.     BP was 155/101 followed by 168/108 mmHg and 156/99 mmHg.     PAtient referred to L and D for assessment of BP. Recommend delivery by 34 weeks if severe range BP confirmed. If no recurrent severe range BP, can continue with  outpatient surveillance with delivery for diagnosis fo severe range BP, preeclampsia or by 37 weeks.     Thank you for the opportunity to participate in the care of this patient. If you have questions regarding today's evaluation or if we can be of further service, please contact the  Maternal-Fetal Medicine Center.     **Fetal anomalies may be present but not detected**                                                                     IMPRESSION  ---------------------------------------------------------------------------------------------------------  Patient is here for antepartum testing secondary to AMA, FGR, BMI > 35 kg/m2, GHTN.  1) Intrauterine pregnancy at 33 1/7 weeks gestational age.  2) The NST (performed for FGR) is reassuring.  3) The amniotic fluid volume appears normal.  4) The UAR Doppler waveform (performed for FGR) is normal.    [facility] BPP SINGLE  :    IMPRESSION  ---------------------------------------------------------------------------------------------------------  1. [place] pregnancy at 33w 0d gestational age.  2. The amniotic fluid volume appeared normal.  3. The BPP was 8/10 (non reactive NST).  4. Transverse presentation.     Assessment/Plan     Ravi Peñaloza is a 44 year old  at 33w3d by LMP c/w 7w6d US, admitted in setting of preeclampsia with severe features (BP).     # Preeclampsia w/ SF (BP, HA)  Previously reviewed diagnosis of preeclampsia with severe features, need for blood pressure control, and contraindications to expectant management.  At this time patient is stable enough to continue to 34 weeks.  Will tentatively schedule IOL at 34 weeks 0 days per patient preference.  Discussed potential risk of needing  section during induction of labor, patient would like to still proceed with plan.  - Serial BP monitoring, q4h BP checks  - BPs: mild range  - Antihypertensives: IV antihypertensives PRN for sustained severe range blood pressures (SBP>160, DBP>110 sustained over 15 minutes)  - D#2 PO Nifedipine ER 30/60, D#2 Labetalol 200 TID  - s/p Mag 24 hours, restart at time of IOL  - Labs: HELLP labs WNL, UPC 0.17 on  > 0.42 today.  - Daily weights, strict I&Os  - Discussed delivery timing at 34w0d unless indicated sooner  - s/p NICU  - continue PTA aspirin 81 mg    # Gestational Diabetes A2    - NPH 8u HS, will increase to 10 uHS given some elevated fastings.   -still on pre-meal insulin sliding scale-since pre-meal glucoses are elevated, will increase to high intensity correction scale.  If requiring correction throughout day, will add morning NPH tomorrow.    - Blood Glucose Checks: Fasting, TID AC, PP, at bedtime   - 2X weekly BPP     # Severe Fetal Growth Restriction  - [place]  EFW 1% (1483g), AC <1%. UA dopplers with normal flow.  -plan for 2X weekly UA doppler      # Hypothyroidism   - PTA levothyroxine 112mcg  PO qdaily  - TSH 0.823 10/20/24     #History of Migraines  -sumitriptan prior to pregnancy   -will try acetaminophen and her normal tea intake today  -continue to try to treat neck pain.    Inpatient Management  - up ad gabo  - Regular diet  - SCD's and prophylactic Lovenox BID  - Q72 hour CBC type/screen     # FWB  - BID NSTs for fetal monitoring   - Ultrasound surveillance plan: BPP 2X weekly M/R and UA doppler 2X weekly   - s/p BMZ for fetal lung maturity (-)   - s/p Mag  - s/p NICU consult     Routine prenatal care  - Rh positive; Antibody negative  - Rubella immune, Hepatitis B NR, Hepatitis C NR, HIV NR, RPR negative (1st trimester and on admission), GBS negative    - GC/CT negative  - GCT - Patient dx with GDMA2    - Other prenatal labs wnl   - Immunizations: Flu declined, TDAP 2024   - Pap  normal   - Contraception: Need to discuss  - Feeding:  Need to discuss    Delivery Plan:    - MOD: Induction of labor for vaginal delivery preferred, pending clinical course and fetal lie. Last was cephalic in the office this morning but will recheck. Consented for  section  in the case that vaginal delivery is no longer a safe option for Ravi or baby.   - TOD:  34 week IOL    # PPH Risk:  - Medium, 12cm fibroid (no prior uterine incisions, [place], <5 prior vaginal deliveries, no h/o pph or bleeding disorder), Type and screen ordered     Kala Cloud MD PhD  Department of Obstetrics, Gynecology and Women's Health  Maternal Fetal Medicine Division      I spent a total of 30 minutes on the date of this encounter including preparing to see the patient (reviewing medical records/tests), counseling and discussing the plan of care, documenting the visit in the electronic medical record, and communicating with other health care professionals and/or care coordination.

## 2024-11-25 ENCOUNTER — APPOINTMENT (OUTPATIENT)
Dept: ULTRASOUND IMAGING | Facility: CLINIC | Age: 44
End: 2024-11-25
Payer: COMMERCIAL

## 2024-11-25 LAB
ABO/RH(D): NORMAL
ALBUMIN SERPL BCG-MCNC: 3.1 G/DL (ref 3.5–5.2)
ALBUMIN SERPL BCG-MCNC: 3.2 G/DL (ref 3.5–5.2)
ALP SERPL-CCNC: 143 U/L (ref 40–150)
ALP SERPL-CCNC: 146 U/L (ref 40–150)
ALT SERPL W P-5'-P-CCNC: 40 U/L (ref 0–50)
ALT SERPL W P-5'-P-CCNC: 42 U/L (ref 0–50)
ANION GAP SERPL CALCULATED.3IONS-SCNC: 12 MMOL/L (ref 7–15)
ANION GAP SERPL CALCULATED.3IONS-SCNC: 13 MMOL/L (ref 7–15)
ANTIBODY SCREEN: NEGATIVE
APTT PPP: 26 SECONDS (ref 22–38)
AST SERPL W P-5'-P-CCNC: 25 U/L (ref 0–45)
AST SERPL W P-5'-P-CCNC: 27 U/L (ref 0–45)
BILIRUB SERPL-MCNC: 0.2 MG/DL
BILIRUB SERPL-MCNC: <0.2 MG/DL
BUN SERPL-MCNC: 10.8 MG/DL (ref 6–20)
BUN SERPL-MCNC: 11 MG/DL (ref 6–20)
CALCIUM SERPL-MCNC: 10.2 MG/DL (ref 8.8–10.4)
CALCIUM SERPL-MCNC: 9.1 MG/DL (ref 8.8–10.4)
CHLORIDE SERPL-SCNC: 103 MMOL/L (ref 98–107)
CHLORIDE SERPL-SCNC: 103 MMOL/L (ref 98–107)
CREAT SERPL-MCNC: 0.68 MG/DL (ref 0.51–0.95)
CREAT SERPL-MCNC: 0.79 MG/DL (ref 0.51–0.95)
EGFRCR SERPLBLD CKD-EPI 2021: >90 ML/MIN/1.73M2
EGFRCR SERPLBLD CKD-EPI 2021: >90 ML/MIN/1.73M2
ERYTHROCYTE [DISTWIDTH] IN BLOOD BY AUTOMATED COUNT: 14.3 % (ref 10–15)
ERYTHROCYTE [DISTWIDTH] IN BLOOD BY AUTOMATED COUNT: 14.4 % (ref 10–15)
FIBRINOGEN PPP-MCNC: 592 MG/DL (ref 170–510)
GLUCOSE BLDC GLUCOMTR-MCNC: 115 MG/DL (ref 70–99)
GLUCOSE BLDC GLUCOMTR-MCNC: 116 MG/DL (ref 70–99)
GLUCOSE BLDC GLUCOMTR-MCNC: 136 MG/DL (ref 70–99)
GLUCOSE BLDC GLUCOMTR-MCNC: 164 MG/DL (ref 70–99)
GLUCOSE BLDC GLUCOMTR-MCNC: 88 MG/DL (ref 70–99)
GLUCOSE BLDC GLUCOMTR-MCNC: 98 MG/DL (ref 70–99)
GLUCOSE SERPL-MCNC: 134 MG/DL (ref 70–99)
GLUCOSE SERPL-MCNC: 81 MG/DL (ref 70–99)
HCO3 SERPL-SCNC: 17 MMOL/L (ref 22–29)
HCO3 SERPL-SCNC: 20 MMOL/L (ref 22–29)
HCT VFR BLD AUTO: 35.5 % (ref 35–47)
HCT VFR BLD AUTO: 36.8 % (ref 35–47)
HGB BLD-MCNC: 12 G/DL (ref 11.7–15.7)
HGB BLD-MCNC: 12.5 G/DL (ref 11.7–15.7)
INR PPP: 0.84 (ref 0.85–1.15)
MCH RBC QN AUTO: 29.1 PG (ref 26.5–33)
MCH RBC QN AUTO: 29.7 PG (ref 26.5–33)
MCHC RBC AUTO-ENTMCNC: 33.8 G/DL (ref 31.5–36.5)
MCHC RBC AUTO-ENTMCNC: 34 G/DL (ref 31.5–36.5)
MCV RBC AUTO: 86 FL (ref 78–100)
MCV RBC AUTO: 87 FL (ref 78–100)
PLATELET # BLD AUTO: 221 10E3/UL (ref 150–450)
PLATELET # BLD AUTO: 235 10E3/UL (ref 150–450)
POTASSIUM SERPL-SCNC: 4 MMOL/L (ref 3.4–5.3)
POTASSIUM SERPL-SCNC: 4.4 MMOL/L (ref 3.4–5.3)
PROT SERPL-MCNC: 6.3 G/DL (ref 6.4–8.3)
PROT SERPL-MCNC: 6.6 G/DL (ref 6.4–8.3)
RBC # BLD AUTO: 4.12 10E6/UL (ref 3.8–5.2)
RBC # BLD AUTO: 4.21 10E6/UL (ref 3.8–5.2)
SODIUM SERPL-SCNC: 133 MMOL/L (ref 135–145)
SODIUM SERPL-SCNC: 135 MMOL/L (ref 135–145)
SPECIMEN EXPIRATION DATE: NORMAL
WBC # BLD AUTO: 10.6 10E3/UL (ref 4–11)
WBC # BLD AUTO: 11 10E3/UL (ref 4–11)

## 2024-11-25 PROCEDURE — 250N000013 HC RX MED GY IP 250 OP 250 PS 637

## 2024-11-25 PROCEDURE — 86850 RBC ANTIBODY SCREEN: CPT

## 2024-11-25 PROCEDURE — 76819 FETAL BIOPHYS PROFIL W/O NST: CPT

## 2024-11-25 PROCEDURE — 85018 HEMOGLOBIN: CPT | Performed by: STUDENT IN AN ORGANIZED HEALTH CARE EDUCATION/TRAINING PROGRAM

## 2024-11-25 PROCEDURE — 120N000002 HC R&B MED SURG/OB UMMC

## 2024-11-25 PROCEDURE — 86900 BLOOD TYPING SEROLOGIC ABO: CPT

## 2024-11-25 PROCEDURE — 76820 UMBILICAL ARTERY ECHO: CPT | Mod: 26 | Performed by: STUDENT IN AN ORGANIZED HEALTH CARE EDUCATION/TRAINING PROGRAM

## 2024-11-25 PROCEDURE — 250N000013 HC RX MED GY IP 250 OP 250 PS 637: Performed by: STUDENT IN AN ORGANIZED HEALTH CARE EDUCATION/TRAINING PROGRAM

## 2024-11-25 PROCEDURE — 250N000011 HC RX IP 250 OP 636

## 2024-11-25 PROCEDURE — 85041 AUTOMATED RBC COUNT: CPT | Performed by: STUDENT IN AN ORGANIZED HEALTH CARE EDUCATION/TRAINING PROGRAM

## 2024-11-25 PROCEDURE — 99232 SBSQ HOSP IP/OBS MODERATE 35: CPT | Mod: 25 | Performed by: STUDENT IN AN ORGANIZED HEALTH CARE EDUCATION/TRAINING PROGRAM

## 2024-11-25 PROCEDURE — 84155 ASSAY OF PROTEIN SERUM: CPT | Performed by: STUDENT IN AN ORGANIZED HEALTH CARE EDUCATION/TRAINING PROGRAM

## 2024-11-25 PROCEDURE — 85610 PROTHROMBIN TIME: CPT | Performed by: STUDENT IN AN ORGANIZED HEALTH CARE EDUCATION/TRAINING PROGRAM

## 2024-11-25 PROCEDURE — 36415 COLL VENOUS BLD VENIPUNCTURE: CPT

## 2024-11-25 PROCEDURE — 80048 BASIC METABOLIC PNL TOTAL CA: CPT | Performed by: OBSTETRICS & GYNECOLOGY

## 2024-11-25 PROCEDURE — 258N000003 HC RX IP 258 OP 636: Performed by: STUDENT IN AN ORGANIZED HEALTH CARE EDUCATION/TRAINING PROGRAM

## 2024-11-25 PROCEDURE — 250N000011 HC RX IP 250 OP 636: Performed by: STUDENT IN AN ORGANIZED HEALTH CARE EDUCATION/TRAINING PROGRAM

## 2024-11-25 PROCEDURE — 76818 FETAL BIOPHYS PROFILE W/NST: CPT | Mod: 26 | Performed by: STUDENT IN AN ORGANIZED HEALTH CARE EDUCATION/TRAINING PROGRAM

## 2024-11-25 PROCEDURE — 85027 COMPLETE CBC AUTOMATED: CPT

## 2024-11-25 PROCEDURE — 82310 ASSAY OF CALCIUM: CPT | Performed by: STUDENT IN AN ORGANIZED HEALTH CARE EDUCATION/TRAINING PROGRAM

## 2024-11-25 PROCEDURE — 85730 THROMBOPLASTIN TIME PARTIAL: CPT | Performed by: STUDENT IN AN ORGANIZED HEALTH CARE EDUCATION/TRAINING PROGRAM

## 2024-11-25 PROCEDURE — 84460 ALANINE AMINO (ALT) (SGPT): CPT | Performed by: STUDENT IN AN ORGANIZED HEALTH CARE EDUCATION/TRAINING PROGRAM

## 2024-11-25 PROCEDURE — 85384 FIBRINOGEN ACTIVITY: CPT | Performed by: STUDENT IN AN ORGANIZED HEALTH CARE EDUCATION/TRAINING PROGRAM

## 2024-11-25 PROCEDURE — 82310 ASSAY OF CALCIUM: CPT | Performed by: OBSTETRICS & GYNECOLOGY

## 2024-11-25 PROCEDURE — 82040 ASSAY OF SERUM ALBUMIN: CPT | Performed by: OBSTETRICS & GYNECOLOGY

## 2024-11-25 PROCEDURE — 36415 COLL VENOUS BLD VENIPUNCTURE: CPT | Performed by: STUDENT IN AN ORGANIZED HEALTH CARE EDUCATION/TRAINING PROGRAM

## 2024-11-25 RX ORDER — TERBUTALINE SULFATE 1 MG/ML
0.25 INJECTION, SOLUTION SUBCUTANEOUS
Status: DISCONTINUED | OUTPATIENT
Start: 2024-11-25 | End: 2024-11-27

## 2024-11-25 RX ORDER — MISOPROSTOL 100 UG/1
25 TABLET ORAL EVERY 4 HOURS PRN
Status: DISCONTINUED | OUTPATIENT
Start: 2024-11-25 | End: 2024-11-27

## 2024-11-25 RX ORDER — HYDROXYZINE HYDROCHLORIDE 50 MG/1
50 TABLET, FILM COATED ORAL
Status: DISCONTINUED | OUTPATIENT
Start: 2024-11-25 | End: 2024-11-27

## 2024-11-25 RX ORDER — NICOTINE POLACRILEX 4 MG
15-30 LOZENGE BUCCAL
Status: DISCONTINUED | OUTPATIENT
Start: 2024-11-25 | End: 2024-11-27

## 2024-11-25 RX ORDER — MORPHINE SULFATE 10 MG/ML
10 INJECTION, SOLUTION INTRAMUSCULAR; INTRAVENOUS
Status: DISCONTINUED | OUTPATIENT
Start: 2024-11-25 | End: 2024-11-27

## 2024-11-25 RX ORDER — HYDRALAZINE HYDROCHLORIDE 20 MG/ML
5 INJECTION INTRAMUSCULAR; INTRAVENOUS ONCE
Status: COMPLETED | OUTPATIENT
Start: 2024-11-25 | End: 2024-11-25

## 2024-11-25 RX ORDER — MAGNESIUM SULFATE IN WATER 40 MG/ML
1 INJECTION, SOLUTION INTRAVENOUS CONTINUOUS
Status: DISPENSED | OUTPATIENT
Start: 2024-11-25 | End: 2024-11-28

## 2024-11-25 RX ORDER — SODIUM CHLORIDE, SODIUM LACTATE, POTASSIUM CHLORIDE, CALCIUM CHLORIDE 600; 310; 30; 20 MG/100ML; MG/100ML; MG/100ML; MG/100ML
INJECTION, SOLUTION INTRAVENOUS CONTINUOUS
Status: DISCONTINUED | OUTPATIENT
Start: 2024-11-25 | End: 2024-11-27

## 2024-11-25 RX ORDER — HYDRALAZINE HYDROCHLORIDE 20 MG/ML
INJECTION INTRAMUSCULAR; INTRAVENOUS
Status: DISCONTINUED
Start: 2024-11-25 | End: 2024-11-25 | Stop reason: HOSPADM

## 2024-11-25 RX ORDER — LABETALOL 200 MG/1
400 TABLET, FILM COATED ORAL 3 TIMES DAILY
Status: DISCONTINUED | OUTPATIENT
Start: 2024-11-25 | End: 2024-11-30

## 2024-11-25 RX ORDER — DEXTROSE MONOHYDRATE 25 G/50ML
25-50 INJECTION, SOLUTION INTRAVENOUS
Status: DISCONTINUED | OUTPATIENT
Start: 2024-11-25 | End: 2024-11-27

## 2024-11-25 RX ORDER — SODIUM CHLORIDE, SODIUM LACTATE, POTASSIUM CHLORIDE, CALCIUM CHLORIDE 600; 310; 30; 20 MG/100ML; MG/100ML; MG/100ML; MG/100ML
INJECTION, SOLUTION INTRAVENOUS
Status: DISCONTINUED
Start: 2024-11-25 | End: 2024-11-25 | Stop reason: HOSPADM

## 2024-11-25 RX ORDER — MAGNESIUM SULFATE HEPTAHYDRATE 40 MG/ML
4 INJECTION, SOLUTION INTRAVENOUS ONCE
Status: COMPLETED | OUTPATIENT
Start: 2024-11-25 | End: 2024-11-25

## 2024-11-25 RX ORDER — MAGNESIUM SULFATE IN WATER 40 MG/ML
INJECTION, SOLUTION INTRAVENOUS
Status: DISCONTINUED
Start: 2024-11-25 | End: 2024-11-25 | Stop reason: HOSPADM

## 2024-11-25 RX ADMIN — HYDRALAZINE HYDROCHLORIDE 5 MG: 20 INJECTION INTRAMUSCULAR; INTRAVENOUS at 13:02

## 2024-11-25 RX ADMIN — LABETALOL HYDROCHLORIDE 400 MG: 200 TABLET, FILM COATED ORAL at 15:19

## 2024-11-25 RX ADMIN — LABETALOL HYDROCHLORIDE 20 MG: 5 INJECTION, SOLUTION INTRAVENOUS at 14:14

## 2024-11-25 RX ADMIN — DOCUSATE SODIUM 100 MG: 100 CAPSULE, LIQUID FILLED ORAL at 08:15

## 2024-11-25 RX ADMIN — LABETALOL HYDROCHLORIDE 400 MG: 200 TABLET, FILM COATED ORAL at 20:18

## 2024-11-25 RX ADMIN — DOCUSATE SODIUM 100 MG: 100 CAPSULE, LIQUID FILLED ORAL at 20:18

## 2024-11-25 RX ADMIN — NIFEDIPINE 30 MG: 30 TABLET, FILM COATED, EXTENDED RELEASE ORAL at 06:39

## 2024-11-25 RX ADMIN — MAGNESIUM SULFATE HEPTAHYDRATE 2 G/HR: 40 INJECTION, SOLUTION INTRAVENOUS at 15:29

## 2024-11-25 RX ADMIN — SODIUM CHLORIDE, POTASSIUM CHLORIDE, SODIUM LACTATE AND CALCIUM CHLORIDE: 600; 310; 30; 20 INJECTION, SOLUTION INTRAVENOUS at 14:33

## 2024-11-25 RX ADMIN — HYDRALAZINE HYDROCHLORIDE 10 MG: 20 INJECTION INTRAMUSCULAR; INTRAVENOUS at 13:44

## 2024-11-25 RX ADMIN — NIFEDIPINE 60 MG: 30 TABLET, FILM COATED, EXTENDED RELEASE ORAL at 18:33

## 2024-11-25 RX ADMIN — PRENATAL VIT W/ FE FUMARATE-FA TAB 27-0.8 MG 1 TABLET: 27-0.8 TAB at 08:15

## 2024-11-25 RX ADMIN — ACETAMINOPHEN 975 MG: 325 TABLET, FILM COATED ORAL at 18:55

## 2024-11-25 RX ADMIN — LABETALOL HYDROCHLORIDE 300 MG: 300 TABLET, FILM COATED ORAL at 08:17

## 2024-11-25 RX ADMIN — ASPIRIN 81 MG CHEWABLE TABLET 81 MG: 81 TABLET CHEWABLE at 08:14

## 2024-11-25 RX ADMIN — LEVOTHYROXINE SODIUM 112 MCG: 112 TABLET ORAL at 05:17

## 2024-11-25 RX ADMIN — MISOPROSTOL 25 MCG: 100 TABLET ORAL at 21:30

## 2024-11-25 RX ADMIN — MAGNESIUM SULFATE HEPTAHYDRATE 4 G: 40 INJECTION, SOLUTION INTRAVENOUS at 14:36

## 2024-11-25 ASSESSMENT — ACTIVITIES OF DAILY LIVING (ADL)
ADLS_ACUITY_SCORE: 0
ADLS_ACUITY_SCORE: 21
ADLS_ACUITY_SCORE: 0
ADLS_ACUITY_SCORE: 0
ADLS_ACUITY_SCORE: 21
ADLS_ACUITY_SCORE: 0
ADLS_ACUITY_SCORE: 21
ADLS_ACUITY_SCORE: 21
ADLS_ACUITY_SCORE: 0
ADLS_ACUITY_SCORE: 21
ADLS_ACUITY_SCORE: 21
ADLS_ACUITY_SCORE: 0
ADLS_ACUITY_SCORE: 0
ADLS_ACUITY_SCORE: 21

## 2024-11-25 NOTE — PROGRESS NOTES
Pre-Eclampsia Shift Note  Data: Blood pressures within parameters and afebrile  .   I/O this shift:  In: 1500 [P.O.:1500]  Out: 1900 [Urine:1900].   Signs and symptoms of pre-eclampsia Present and include: headache. Rates pain a 3 on a scale of 1-10.  Describes pain as aching. Tylenol given, see MAR for details. Reassess in 1 hour. Patient states pain 0/10  Patient states now comfortable and resting well. Fetal assessment Appropriate for Gestational Age. Pre-eclampsia labs Previously done, results active. Monitor blood pressures and indicators of pre-eclampsia every 4 hours.  Medications: labetalol TID and nifedipine BID.   Continue uterine/fetal assessment twice daily. Activity level Regular activity. Preventive measures include Medications, Positioning, and Frequent voiding. Encourage active range of motion and frequent position changes.  Plan: Continue expectant management. Observe for and notify care provider of indicators of worsening pre-eclampsia or signs of fetal/maternal compromise.

## 2024-11-25 NOTE — PROGRESS NOTES
Antepartum Progress Note    November 25, 2024  Ravi Peñaloza  [id number]      HPI     Feeling overall well this morning. Continues to have right-sided headache which has been persistent since admission. Currently rated 3/10. Feels very similar to migraines which she has had in the past (both prior to pregnancy and in the first trimester). Prior to pregnancy she would take sumatriptan for migraines, but has not been using this in pregnancy. Her typical migraine lasts about 3-4 days. This is her 4th day of her current headache, so hoping it will improve later today. Denies vision changes, chest pain, shortness of breath or abdominal pain. Feels her lower extremity swelling has improved since admission. Has not noticed any contractions, vaginal bleeding or leaking fluid. Endorses normal fetal movement.     Physical Exam     Vitals:    11/25/24 0408 11/25/24 0620 11/25/24 0637 11/25/24 0817   BP: 131/80  139/85 (!) 144/91   BP Location: Left arm  Left arm    Patient Position: Semi-Acevedo's  Semi-Acevedo's    Cuff Size: Adult Regular  Adult Regular    Pulse:    79   Resp: 16      Temp: 98.2  F (36.8  C)  98  F (36.7  C)    TempSrc: Oral  Oral    SpO2:       Weight:  86.1 kg (189 lb 12.8 oz)       General: alert, oriented female, resting in bed   CV: well perfused   Lungs: breathing comfortably on room air   Abdomen: soft, gravid, non-tender  Extremities: bilateral lower extremities non-tender with 1+ bilateral edema    FHT: baseline 140, moderate variability, accelerations present, no decelerations  [place]: quiet  reactive     Labs     Recent Results (from the past 12 hours)   Glucose by meter    Collection Time: 11/24/24  9:12 PM   Result Value Ref Range    GLUCOSE BY METER POCT 110 (H) 70 - 99 mg/dL   Glucose by meter    Collection Time: 11/25/24  5:22 AM   Result Value Ref Range    GLUCOSE BY METER POCT 98 70 - 99 mg/dL      Imaging     [facility] BPP W/ LTD (11/25)  GENERAL  EVALUATION  ---------------------------------------------------------------------------------------------------------  Cardiac activity present.  bpm. Fetal movements: visualized. Presentation: cephalic  Placenta: Right lateral, No Previa, > 2 cm from internal os.  Umbilical cord: Cord vessels: 3 vessel cord        AMNIOTIC FLUID ASSESSMENT  ---------------------------------------------------------------------------------------------------------  Amount of AF: normal  MVP 4.3 cm        BIOPHYSICAL PROFILE  ---------------------------------------------------------------------------------------------------------  2: Fetal breathing movements  2: Gross body movements  2: Fetal tone  2: Amniotic fluid volume  8/8 Biophysical profile score  Interpretation: normal        FETAL DOPPLER  ---------------------------------------------------------------------------------------------------------  Umbilical Artery: normal. Sampling site: [facility]  PI                                                             1.00                                                  74%                             Omar  HR                                                           155                     bpm        RECOMMENDATION  ---------------------------------------------------------------------------------------------------------  Patient currently admitted to the hospital with preeclampsia with severe features and severe fetal growth restriction.     Patient currently planned for IOL on 11/28.     If you have questions regarding today's evaluation or if we can be of further service, please contact the [facility].     **Fetal anomalies may be present but not detected**                                                                         IMPRESSION  ---------------------------------------------------------------------------------------------------------  1. Monge pregnancy at 33w 4d with severe fetal growth  restriction (EFW 1% on ).  2. The amniotic fluid volume appeared normal.  3. The umbilical artery Dopplers were normal.  4. BPP is reassuring.     Assessment/Plan     Ravi Peñaloza is a 44 year old  at 33w4d by LMP c/w 7w6d US, now HD#4 admitted in setting of preeclampsia with severe features (BP criteria).     # Preeclampsia w/ SF (BP)  Previously reviewed diagnosis of preeclampsia with severe features, need for blood pressure control, and contraindications to expectant management.  At this time patient is stable enough to continue to 34 weeks.  IOL is scheduled at 34 weeks 0 days per patient preference.  Previously discussed potential risk of needing  section during induction of labor and pt voiced understanding. Pt with persistent headache since admission which she states feels similar to her baseline migraine which lasts 3-4 days. Today is D#4 of her current headache; discussed if no improvement by tomorrow, HA is c/f severe feature of preE and we may make recommendation for IOL a few days early.   - Serial BP monitoring, q4h BP checks  - BPs: normotensive to mild range last 24 hours (improved after increase of labetalol to 300 TID from 200 TID yesterday evening)  - Antihypertensives: IV antihypertensives PRN for sustained severe range blood pressures (SBP>160, DBP>110 sustained over 15 minutes)  - Symptoms: HA as above; continue hydration, tylenol, other supportive measures; if no improvement by tomorrow, likely recommend proceeding with IOL   - continue D#3 PO Nifedipine ER 30/60, D#1 labetalol 300 TID  - s/p Mag 24 hours, restart at time of IOL  - Labs: HELLP labs WNL, UPC 0.17 on > 0.42 ()  - Daily weights, strict I&Os  - Discussed delivery timing at 34w0d unless indicated sooner  - s/p NICU  - continue PTA aspirin 81 mg    # Gestational Diabetes A2    - NPH 10 uHS   - high intensity correction scale  - -144 through the day yesterday, fasting 98 this morning; will continue  current regimen for now, if persistently elevated fasting, consider increase in long-acting  - Blood Glucose Checks: Fasting, TID AC, PP, at bedtime   - 2X weekly BPP, next today ()     # Severe Fetal Growth Restriction  - [place]  EFW 1% (1483g), AC <1%  - BPP 8/8 this AM with normal dopplers   - no further UAD/BPP scheduled prior to delivery (sched )      # Hypothyroidism   - PTA levothyroxine 112mcg PO qdaily  - TSH 0.823 10/20/24     # HA  #History of Migraines  Pt reports migraines typically 1 sided and last 3-4 days. Current HA feels similar to baseline migraines, though she had not had a migraine in the 2nd or 3rd trimester until this admission. If persistent past typical ~4d course, will consider IOL given concern that current HA may represent a severe feature.    - sumitriptan prior to pregnancy [held]  - continue tylenol prn     Inpatient Management  - up ad gabo  - Regular diet  - SCD's  - Q72 hour CBC type/screen     # FWB  - BID NSTs for fetal monitoring   - Ultrasound surveillance plan: BPP 2X weekly M/R and UA doppler 2X weekly; no further US surveillance indicated given IOL scheduled    - s/p BMZ for fetal lung maturity (-)   - s/p Mag  - s/p NICU consult     Routine prenatal care  - Rh positive; Antibody negative  - Rubella immune, Hepatitis B NR, Hepatitis C NR, HIV NR, RPR negative (1st trimester and on admission), GBS negative    - GC/CT negative  - GCT - Patient dx with GDMA2    - Other prenatal labs wnl   - Immunizations: Flu declined, TDAP 2024   - Pap  normal   - Contraception: Need to discuss  - Feeding:  Need to discuss    Delivery Plan:    - MOD: Induction of labor for vaginal delivery preferred, pending clinical course and fetal lie; will re-scan on morning of IOL. Consented for  section () in the case that vaginal delivery is no longer a safe option for Ravi or baby.   - TOD:  34 week IOL (scheduled 0730 on )    # PPH Risk:  -  Medium, 12cm fibroid (no prior uterine incisions, sarmiento, <5 prior vaginal deliveries, no h/o pph or bleeding disorder), Type and screen ordered     Patient seen and evaluated with Dr. Raymond Duong MD  OB/GYN PGY-3  11/25/2024 8:42 AM

## 2024-11-25 NOTE — PLAN OF CARE
Pre-Eclampsia Shift Note  Data: Blood pressures within parameters.   Vitals:    11/24/24 0815 11/24/24 0825 11/24/24 1405 11/24/24 1805   BP:  (!) 155/89 (!) 153/92 138/84   BP Location:  Left arm Left arm Left arm   Patient Position:  Right side Right side Right side   Cuff Size:  Adult Regular Adult Regular Adult Regular   Pulse:       Resp:  16 16 16   Temp:  98  F (36.7  C)  98.1  F (36.7  C)   TempSrc:  Oral  Oral   SpO2:       Weight: 85.9 kg (189 lb 6.4 oz)      .   Vitals:    11/22/24 1413 11/23/24 0720 11/24/24 0815   Weight: 88.4 kg (194 lb 14.4 oz) 88 kg (194 lb 1.6 oz) 85.9 kg (189 lb 6.4 oz)   .   No intake/output data recorded..   Signs and symptoms of pre-eclampsia Present and include: headache. Fetal assessment Appropriate for Gestational Age.  Pre-eclampsia labs Previously done, results active  Interventions: Monitor blood pressures and indicators of pre-eclampsia every 4 hours.  Medications: labetalol TID and nifedipine BID.   Continue uterine/fetal assessment twice daily. Activity level Advance activity as tolerated. Preventive measures include Medications. Encourage active range of motion and frequent position changes.  Plan: Continue expectant management. Observe for and notify care provider of indicators of worsening pre-eclampsia or signs of fetal/maternal compromise.

## 2024-11-25 NOTE — PROGRESS NOTES
MFM Brief Progress Note    S: To bedside to assess patient in setting of sustained severe range blood pressure. Patient reports her headache has completely resolved. Denies vision changes. No abdominal or epigastric pain. Good fetal movements.    O:  /86  /86    Gen: NAD  Abd: soft, non tender  Exrem: trace edema    EFM: 150/mod/accel/no decel  [place]: 0/10    Component      Latest Ref Rng 2024  11:10 AM   Creatinine      0.51 - 0.95 mg/dL 0.79    AST      0 - 45 U/L 25    ALT      0 - 50 U/L 40    Platelet Count      150 - 450 10e3/uL 221       A/P:  44 year old  at 33w4d admitted with preeclampsia with severe features by blood pressure criteria. Now with sustained severe range blood pressure.  -IV hydralazine 5mg given then 10mg, awaiting repeat blood pressure  -Will increase labetalol (due at 2pm) from 300mg TID to 400mg TID  -11am preeclampsia labs within normal limits  -continue to monitor vitals  -continue to monitor symptoms  -continuous fetal monitoring for now  -initiate magnesium for seizure prophylaxis given multiple severe range blood pressures    Maru Andrade MD  , OB/GYN  Maternal-Fetal Medicine

## 2024-11-25 NOTE — PROGRESS NOTES
Brief Note    In to meet patient, patient getting ready to go on a walk.    She is doing well currently, headache rated at 1-2/10. Has no acute concerns or questions.    # PreE with Severe Features  Intermittent high mild ranges today  - Plan to increase Labetalol to 300 TID this evening  - Continue to monitor.    # GDMA2  - Increasing NPH to 10u at bedtime tonight  - Continue HSSI    Dianne Omalley MD  Ob/Gyn Resident, PGY-3  11/24/2024 6:47 PM

## 2024-11-26 ENCOUNTER — ANESTHESIA (OUTPATIENT)
Dept: OBGYN | Facility: CLINIC | Age: 44
End: 2024-11-26
Payer: COMMERCIAL

## 2024-11-26 ENCOUNTER — ANESTHESIA EVENT (OUTPATIENT)
Dept: OBGYN | Facility: CLINIC | Age: 44
End: 2024-11-26
Payer: COMMERCIAL

## 2024-11-26 LAB
ALBUMIN SERPL BCG-MCNC: 3.2 G/DL (ref 3.5–5.2)
ALP SERPL-CCNC: 150 U/L (ref 40–150)
ALT SERPL W P-5'-P-CCNC: 58 U/L (ref 0–50)
ANION GAP SERPL CALCULATED.3IONS-SCNC: 14 MMOL/L (ref 7–15)
AST SERPL W P-5'-P-CCNC: 44 U/L (ref 0–45)
BILIRUB SERPL-MCNC: 0.2 MG/DL
BUN SERPL-MCNC: 8.4 MG/DL (ref 6–20)
CALCIUM SERPL-MCNC: 7.7 MG/DL (ref 8.8–10.4)
CHLORIDE SERPL-SCNC: 99 MMOL/L (ref 98–107)
CREAT SERPL-MCNC: 0.68 MG/DL (ref 0.51–0.95)
EGFRCR SERPLBLD CKD-EPI 2021: >90 ML/MIN/1.73M2
ERYTHROCYTE [DISTWIDTH] IN BLOOD BY AUTOMATED COUNT: 14.3 % (ref 10–15)
GLUCOSE BLDC GLUCOMTR-MCNC: 108 MG/DL (ref 70–99)
GLUCOSE BLDC GLUCOMTR-MCNC: 110 MG/DL (ref 70–99)
GLUCOSE BLDC GLUCOMTR-MCNC: 112 MG/DL (ref 70–99)
GLUCOSE BLDC GLUCOMTR-MCNC: 120 MG/DL (ref 70–99)
GLUCOSE BLDC GLUCOMTR-MCNC: 135 MG/DL (ref 70–99)
GLUCOSE BLDC GLUCOMTR-MCNC: 137 MG/DL (ref 70–99)
GLUCOSE BLDC GLUCOMTR-MCNC: 141 MG/DL (ref 70–99)
GLUCOSE BLDC GLUCOMTR-MCNC: 146 MG/DL (ref 70–99)
GLUCOSE BLDC GLUCOMTR-MCNC: 154 MG/DL (ref 70–99)
GLUCOSE SERPL-MCNC: 98 MG/DL (ref 70–99)
HCO3 SERPL-SCNC: 18 MMOL/L (ref 22–29)
HCT VFR BLD AUTO: 38.6 % (ref 35–47)
HGB BLD-MCNC: 12.9 G/DL (ref 11.7–15.7)
MAGNESIUM SERPL-MCNC: 5.8 MG/DL (ref 1.7–2.3)
MAGNESIUM SERPL-MCNC: 7 MG/DL (ref 1.7–2.3)
MCH RBC QN AUTO: 28.5 PG (ref 26.5–33)
MCHC RBC AUTO-ENTMCNC: 33.4 G/DL (ref 31.5–36.5)
MCV RBC AUTO: 85 FL (ref 78–100)
PLATELET # BLD AUTO: 218 10E3/UL (ref 150–450)
POTASSIUM SERPL-SCNC: 4.2 MMOL/L (ref 3.4–5.3)
PROT SERPL-MCNC: 6.8 G/DL (ref 6.4–8.3)
RBC # BLD AUTO: 4.53 10E6/UL (ref 3.8–5.2)
SODIUM SERPL-SCNC: 131 MMOL/L (ref 135–145)
WBC # BLD AUTO: 10.8 10E3/UL (ref 4–11)

## 2024-11-26 PROCEDURE — 36415 COLL VENOUS BLD VENIPUNCTURE: CPT

## 2024-11-26 PROCEDURE — 83735 ASSAY OF MAGNESIUM: CPT

## 2024-11-26 PROCEDURE — 250N000013 HC RX MED GY IP 250 OP 250 PS 637

## 2024-11-26 PROCEDURE — 59200 INSERT CERVICAL DILATOR: CPT | Mod: GC | Performed by: OBSTETRICS & GYNECOLOGY

## 2024-11-26 PROCEDURE — 85027 COMPLETE CBC AUTOMATED: CPT

## 2024-11-26 PROCEDURE — 82040 ASSAY OF SERUM ALBUMIN: CPT

## 2024-11-26 PROCEDURE — 120N000002 HC R&B MED SURG/OB UMMC

## 2024-11-26 PROCEDURE — 82310 ASSAY OF CALCIUM: CPT

## 2024-11-26 PROCEDURE — 99232 SBSQ HOSP IP/OBS MODERATE 35: CPT | Mod: 25 | Performed by: OBSTETRICS & GYNECOLOGY

## 2024-11-26 PROCEDURE — 3E0P7VZ INTRODUCTION OF HORMONE INTO FEMALE REPRODUCTIVE, VIA NATURAL OR ARTIFICIAL OPENING: ICD-10-PCS | Performed by: OBSTETRICS & GYNECOLOGY

## 2024-11-26 PROCEDURE — 82247 BILIRUBIN TOTAL: CPT

## 2024-11-26 PROCEDURE — 250N000011 HC RX IP 250 OP 636: Performed by: STUDENT IN AN ORGANIZED HEALTH CARE EDUCATION/TRAINING PROGRAM

## 2024-11-26 PROCEDURE — 250N000013 HC RX MED GY IP 250 OP 250 PS 637: Performed by: STUDENT IN AN ORGANIZED HEALTH CARE EDUCATION/TRAINING PROGRAM

## 2024-11-26 PROCEDURE — 258N000003 HC RX IP 258 OP 636: Performed by: STUDENT IN AN ORGANIZED HEALTH CARE EDUCATION/TRAINING PROGRAM

## 2024-11-26 PROCEDURE — 250N000009 HC RX 250: Performed by: OBSTETRICS & GYNECOLOGY

## 2024-11-26 RX ORDER — LEVOTHYROXINE SODIUM 112 UG/1
56 TABLET ORAL ONCE
Status: COMPLETED | OUTPATIENT
Start: 2024-11-26 | End: 2024-11-26

## 2024-11-26 RX ORDER — LIDOCAINE 40 MG/G
CREAM TOPICAL
Status: DISCONTINUED | OUTPATIENT
Start: 2024-11-26 | End: 2024-11-27

## 2024-11-26 RX ORDER — NALBUPHINE HYDROCHLORIDE 10 MG/ML
2.5-5 INJECTION INTRAMUSCULAR; INTRAVENOUS; SUBCUTANEOUS EVERY 6 HOURS PRN
OUTPATIENT
Start: 2024-11-26

## 2024-11-26 RX ORDER — OXYTOCIN/0.9 % SODIUM CHLORIDE 30/500 ML
1-24 PLASTIC BAG, INJECTION (ML) INTRAVENOUS CONTINUOUS
Status: DISCONTINUED | OUTPATIENT
Start: 2024-11-26 | End: 2024-11-27

## 2024-11-26 RX ORDER — FENTANYL CITRATE-0.9 % NACL/PF 10 MCG/ML
100 PLASTIC BAG, INJECTION (ML) INTRAVENOUS EVERY 5 MIN PRN
OUTPATIENT
Start: 2024-11-26

## 2024-11-26 RX ORDER — FENTANYL/ROPIVACAINE/NS/PF 2MCG/ML-.1
PLASTIC BAG, INJECTION (ML) EPIDURAL
OUTPATIENT
Start: 2024-11-26

## 2024-11-26 RX ORDER — SODIUM CHLORIDE, SODIUM LACTATE, POTASSIUM CHLORIDE, CALCIUM CHLORIDE 600; 310; 30; 20 MG/100ML; MG/100ML; MG/100ML; MG/100ML
INJECTION, SOLUTION INTRAVENOUS CONTINUOUS PRN
Status: DISCONTINUED | OUTPATIENT
Start: 2024-11-26 | End: 2024-11-27

## 2024-11-26 RX ADMIN — PRENATAL VIT W/ FE FUMARATE-FA TAB 27-0.8 MG 1 TABLET: 27-0.8 TAB at 08:57

## 2024-11-26 RX ADMIN — DOCUSATE SODIUM 100 MG: 100 CAPSULE, LIQUID FILLED ORAL at 20:02

## 2024-11-26 RX ADMIN — LEVOTHYROXINE SODIUM 112 MCG: 112 TABLET ORAL at 06:06

## 2024-11-26 RX ADMIN — SODIUM CHLORIDE, POTASSIUM CHLORIDE, SODIUM LACTATE AND CALCIUM CHLORIDE: 600; 310; 30; 20 INJECTION, SOLUTION INTRAVENOUS at 16:02

## 2024-11-26 RX ADMIN — NIFEDIPINE 60 MG: 30 TABLET, FILM COATED, EXTENDED RELEASE ORAL at 18:09

## 2024-11-26 RX ADMIN — MISOPROSTOL 25 MCG: 100 TABLET ORAL at 06:05

## 2024-11-26 RX ADMIN — LABETALOL HYDROCHLORIDE 400 MG: 200 TABLET, FILM COATED ORAL at 20:02

## 2024-11-26 RX ADMIN — LABETALOL HYDROCHLORIDE 400 MG: 200 TABLET, FILM COATED ORAL at 14:03

## 2024-11-26 RX ADMIN — Medication 2 MILLI-UNITS/MIN: at 15:57

## 2024-11-26 RX ADMIN — SODIUM CHLORIDE, POTASSIUM CHLORIDE, SODIUM LACTATE AND CALCIUM CHLORIDE: 600; 310; 30; 20 INJECTION, SOLUTION INTRAVENOUS at 03:43

## 2024-11-26 RX ADMIN — Medication 56 MCG: at 06:11

## 2024-11-26 RX ADMIN — MISOPROSTOL 25 MCG: 100 TABLET ORAL at 01:33

## 2024-11-26 RX ADMIN — MISOPROSTOL 25 MCG: 100 TABLET ORAL at 10:28

## 2024-11-26 RX ADMIN — NIFEDIPINE 30 MG: 30 TABLET, FILM COATED, EXTENDED RELEASE ORAL at 06:53

## 2024-11-26 RX ADMIN — MAGNESIUM SULFATE HEPTAHYDRATE 2 G/HR: 40 INJECTION, SOLUTION INTRAVENOUS at 10:38

## 2024-11-26 RX ADMIN — DOCUSATE SODIUM 100 MG: 100 CAPSULE, LIQUID FILLED ORAL at 08:57

## 2024-11-26 RX ADMIN — LABETALOL HYDROCHLORIDE 400 MG: 200 TABLET, FILM COATED ORAL at 08:57

## 2024-11-26 ASSESSMENT — ACTIVITIES OF DAILY LIVING (ADL)
ADLS_ACUITY_SCORE: 21

## 2024-11-26 NOTE — PLAN OF CARE
Goal Outcome Evaluation:       Sustained severe range BP's this afternoon which required Hydralazine and Labetalol IV as well as Magnesium Sulfate. Had a HA this morning which resolved but returned this evening. LS clear and equal, adequate I+O, reflexes +1 bilaterally and no clonus. No visual changes, epigastric pain, SOB, dizziness nor chest pain. Bg WNL with the exception of an elevated pre meal Bg which required 1 unit of insulin. Afebrile.   FHT's 130 with moderate variability, accelerations and no decelerations. Baby active.    Plan to move to labor for IOL. Bedside US vertex.

## 2024-11-26 NOTE — PLAN OF CARE
"Data: Contraction frequency q 3-5 minutes and irregular, palpate mild. Fetal assessment category one with period of catagory two. Membrane Intact. Support person Pavin present. Continue uterine/fetal assessment continuous. Vital Signs per order set. Mild range BP. Medicated for none.Signs and symptoms of pre-eclampsia Present and include: headache. Rates pain a 3 on a scale of 1-10 last.  Tylenol given, see MAR for details. Patient states pain 1/10  Patient states now comfortable and rested well  throughout the night. I&O  maintained. Pt remained free of Mag toxicity symptoms. Pt has received 3 dose of miso.     Plan: Anticipate . Provide labor/coping assistance as needed by patient and support person.  Observe for and notify care provider of indications of progressing labor, need for pain medications, or signs of fetal/maternal compromise.    Problem: Adult Inpatient Plan of Care  Goal: Plan of Care Review  Description: The Plan of Care Review/Shift note should be completed every shift.  The Outcome Evaluation is a brief statement about your assessment that the patient is improving, declining, or no change.  This information will be displayed automatically on your shift  note.  2024 by Lani Elizabeth RN  Outcome: Progressing  Flowsheets (Taken 2024)  Plan of Care Reviewed With:   patient   spouse  Overall Patient Progress: improving  2024 by Lani Elizabeth RN  Outcome: Progressing  Flowsheets (Taken 2024)  Plan of Care Reviewed With:   patient   spouse  Goal: Patient-Specific Goal (Individualized)  Description: You can add care plan individualizations to a care plan. Examples of Individualization might be:  \"Parent requests to be called daily at 9am for status\", \"I have a hard time hearing out of my right ear\", or \"Do not touch me to wake me up as it startles  me\".  2024 by Lani Elizabeth RN  Outcome: Progressing  2024 by Clara, " NOEMI Garibay  Outcome: Progressing  Goal: Absence of Hospital-Acquired Illness or Injury  11/26/2024 0708 by Lani Elizabeth RN  Outcome: Progressing  11/26/2024 0707 by Lani Elizabeth RN  Outcome: Progressing  Intervention: Prevent Skin Injury  Recent Flowsheet Documentation  Taken 11/25/2024 2355 by Lani Elizabeth RN  Body Position: position changed independently  Taken 11/25/2024 2014 by Lani Elizabeth RN  Body Position: position changed independently  Intervention: Prevent Infection  Recent Flowsheet Documentation  Taken 11/25/2024 2355 by Lani Elizabeth RN  Infection Prevention:   hand hygiene promoted   rest/sleep promoted   single patient room provided  Taken 11/25/2024 2014 by Lani Elizabeth RN  Infection Prevention:   hand hygiene promoted   rest/sleep promoted   single patient room provided  Goal: Optimal Comfort and Wellbeing  11/26/2024 0708 by Lani Elizabeth RN  Outcome: Progressing  11/26/2024 0707 by Lani Elizabeth RN  Outcome: Progressing  Intervention: Monitor Pain and Promote Comfort  Recent Flowsheet Documentation  Taken 11/26/2024 0559 by Lani Elizabeth RN  Pain Management Interventions: (lights adjusted) environmental changes  Taken 11/25/2024 2014 by Lani Elizabeth RN  Pain Management Interventions: medication (see MAR)  Intervention: Provide Person-Centered Care  Recent Flowsheet Documentation  Taken 11/25/2024 2355 by Lani Elizabeth RN  Trust Relationship/Rapport:   care explained   choices provided   emotional support provided   empathic listening provided   questions answered   questions encouraged   reassurance provided   thoughts/feelings acknowledged  Taken 11/25/2024 2014 by Lani Elizabeth RN  Trust Relationship/Rapport:   care explained   choices provided   emotional support provided   empathic listening provided   questions answered   questions encouraged   reassurance provided   thoughts/feelings acknowledged  Goal: Readiness for  Transition of Care  11/26/2024 0708 by Lani Elizabeth RN  Outcome: Progressing  11/26/2024 0707 by Lani Elizabeth RN  Outcome: Progressing     Problem: Hypertensive Disorders in Pregnancy  Goal: Patient-Fetal Stabilization  11/26/2024 0708 by Lani Elizabeth RN  Outcome: Progressing  11/26/2024 0707 by Lani Elizabeth RN  Outcome: Progressing  Intervention: Optimize Blood Pressure and Fluid Status  Recent Flowsheet Documentation  Taken 11/25/2024 2355 by Lani Elizabeth RN  Fetal Wellbeing Promotion:   fetal heart rate monitored   intake and output monitored   uterine contraction activity assessed  Fluid/Electrolyte Management: fluids provided  Taken 11/25/2024 2014 by Lani Elizabeth RN  Fetal Wellbeing Promotion:   fetal heart rate monitored   intake and output monitored   uterine contraction activity assessed  Fluid/Electrolyte Management: fluids provided     Problem: Diabetes in Pregnancy  Goal: Optimal Coping  11/26/2024 0708 by Lani Elizabeth RN  Outcome: Progressing  11/26/2024 0707 by Lani Elizabeth RN  Outcome: Progressing  Intervention: Support Wellbeing and Self-Management Success  Recent Flowsheet Documentation  Taken 11/25/2024 2355 by Lani Elizabeth RN  Family/Support System Care: presence promoted  Taken 11/25/2024 2014 by Lani Elizabeth RN  Family/Support System Care: presence promoted  Goal: Blood Glucose Level Within Targeted Range  11/26/2024 0708 by Lani Elizabeth RN  Outcome: Progressing  11/26/2024 0707 by Lani Elizabeth RN  Outcome: Progressing  Intervention: Monitor and Manage Glycemia  Recent Flowsheet Documentation  Taken 11/25/2024 2355 by Lani Elizabeth RN  Fetal Wellbeing Promotion:   fetal heart rate monitored   intake and output monitored   uterine contraction activity assessed  Taken 11/25/2024 2014 by Lani Elizabeth RN  Fetal Wellbeing Promotion:   fetal heart rate monitored   intake and output monitored   uterine contraction  activity assessed     Problem: Labor  Goal: Hemostasis  Outcome: Progressing  Goal: Stable Fetal Wellbeing  Outcome: Progressing  Intervention: Promote and Monitor Fetal Wellbeing  Recent Flowsheet Documentation  Taken 11/25/2024 2355 by Lani Elizabeth RN  Body Position: position changed independently  Fetal Wellbeing Promotion:   fetal heart rate monitored   intake and output monitored   uterine contraction activity assessed  Taken 11/25/2024 2014 by Lani Elizabeth RN  Body Position: position changed independently  Fetal Wellbeing Promotion:   fetal heart rate monitored   intake and output monitored   uterine contraction activity assessed  Goal: Effective Progression to Delivery  Outcome: Progressing  Goal: Absence of Infection Signs and Symptoms  Outcome: Progressing  Intervention: Prevent or Manage Infection  Recent Flowsheet Documentation  Taken 11/25/2024 2355 by Lani Elizabeth RN  Infection Prevention:   hand hygiene promoted   rest/sleep promoted   single patient room provided  Taken 11/25/2024 2014 by Lani Elizabeth RN  Infection Prevention:   hand hygiene promoted   rest/sleep promoted   single patient room provided  Goal: Acceptable Pain Control  Outcome: Progressing  Goal: Normal Uterine Contraction Pattern  Outcome: Progressing  Intervention: Monitor and Manage Uterine Activity  Recent Flowsheet Documentation  Taken 11/25/2024 2355 by Lani Elizabeth RN  Fluid/Electrolyte Management: fluids provided  Taken 11/25/2024 2014 by Lani Elizabeth RN  Fluid/Electrolyte Management: fluids provided   Goal Outcome Evaluation:      Plan of Care Reviewed With: patient, spouse    Overall Patient Progress: improvingOverall Patient Progress: improving

## 2024-11-26 NOTE — PROGRESS NOTES
Brief Magnesium Check/ Labor Progress Note    S: Doing okay. Was having more intense contractions which have gotten better since the balloon expelled. Denies headache, vision change, chest pain, SOB.     O: /76   Pulse 81   Temp 98.2  F (36.8  C) (Oral)   Resp 16   Wt 86.1 kg (189 lb 12.8 oz)   LMP 2024 (Exact Date)   SpO2 98%   BMI 35.86 kg/m      General: NAD  CV: regular rate, warm and well perfused   Resp:  breathing comfortably on room air   Ext: patellar reflexes 2+ bilaterally,  1+ edema on bilateral LE    UOP: Adequate    SVE: 3/50/-2    FHT: baseline 130, moderate variability with periods of minimal, no accelerations, no decelerations     A/P: 44 year old  at 33w5d being induced for pre-eclampsia with severe features. Tolerating magnesium without complaint. Will transition to augmentation with pitocin.     # IOL  - s/p ripening with misoprostol and magdaleno balloon  - transition to augmentation with pitocin per protocol     # Pre- eclampsia with severe features  - Mag started at 1436 (1126)  - Paused this AM 2/2 weakness which has since improved; mg level returned at 7.0. magnesium re-started at 1g/h. Exam reassuring against magnesium toxicity at this time   - repeat magnesium level 1800   - Next clinical Magnesium check at 1900    Verna Duong MD  OB/GYN PGY-3  2024 3:10 PM

## 2024-11-26 NOTE — ANESTHESIA PREPROCEDURE EVALUATION
Anesthesia Pre-Procedure Evaluation    Patient: Ravi Peñaloza   MRN: [id number] : 1980        Procedure :  section          Past Medical History:   Diagnosis Date    Fibroid uterus     Gestational diabetes     Hypothyroidism     Migraine without aura and without status migrainosus, not intractable       Past Surgical History:   Procedure Laterality Date    CHOLECYSTECTOMY  2018    LIPOSUCTION (LOCATION)        No Known Allergies   Social History     Tobacco Use    Smoking status: Never    Smokeless tobacco: Never   Substance Use Topics    Alcohol use: Not Currently     Comment: occasional prior to pregancy      Wt Readings from Last 1 Encounters:   24 86.1 kg (189 lb 12.8 oz)        Anesthesia Evaluation   Pt has had prior anesthetic. Type: General.    No history of anesthetic complications       ROS/MED HX  ENT/Pulmonary:    (-) asthma   Neurologic: Comment: HA/migraines      Cardiovascular: Comment: PreE w SF (BP)    (+)  - - PIH  -  - -                                      METS/Exercise Tolerance:     Hematologic:  - neg hematologic  ROS     Musculoskeletal:       GI/Hepatic:  - neg GI/hepatic ROS     Renal/Genitourinary:       Endo: Comment: hypothyroidism    (+)          thyroid problem, hypothyroidism,    Obesity,   gestational diabetes and Insulin,    Psychiatric/Substance Use:  - neg psychiatric ROS     Infectious Disease:       Malignancy:       Other:   45yo  at 33w5d  Admission: PreE with severe features  PMHx: PreE w SF (BP), GDMA2, severe FGR, hypothyroidism, HA/migraines, PPH risk d/t fibroid (12cm)  Labs: plt 218 ()             Physical Exam    Airway        Mallampati: III   TM distance: > 3 FB   Neck ROM: full   Mouth opening: > 3 cm    Respiratory Devices and Support         Dental  no notable dental history         Cardiovascular   cardiovascular exam normal          Pulmonary   pulmonary exam normal                OUTSIDE LABS:  CBC:   Lab Results   Component  "Value Date    WBC 10.6 11/25/2024    WBC 11.0 11/25/2024    HGB 12.0 11/25/2024    HGB 12.5 11/25/2024    HCT 35.5 11/25/2024    HCT 36.8 11/25/2024     11/25/2024     11/25/2024     BMP:   Lab Results   Component Value Date     (L) 11/25/2024     11/25/2024    POTASSIUM 4.0 11/25/2024    POTASSIUM 4.4 11/25/2024    CHLORIDE 103 11/25/2024    CHLORIDE 103 11/25/2024    CO2 17 (L) 11/25/2024    CO2 20 (L) 11/25/2024    BUN 11.0 11/25/2024    BUN 10.8 11/25/2024    CR 0.68 11/25/2024    CR 0.79 11/25/2024     (H) 11/26/2024     (H) 11/25/2024     COAGS:   Lab Results   Component Value Date    PTT 26 11/25/2024    INR 0.84 (L) 11/25/2024    FIBR 592 (H) 11/25/2024     POC: No results found for: \"BGM\", \"HCG\", \"HCGS\"  HEPATIC:   Lab Results   Component Value Date    ALBUMIN 3.1 (L) 11/25/2024    PROTTOTAL 6.3 (L) 11/25/2024    ALT 42 11/25/2024    AST 27 11/25/2024    ALKPHOS 143 11/25/2024    BILITOTAL <0.2 11/25/2024     OTHER:   Lab Results   Component Value Date    LUNA 9.1 11/25/2024       Anesthesia Plan    ASA Status:  3    NPO Status:  ELEVATED Aspiration Risk/Unknown    Anesthesia Type: Spinal.              Consents    Anesthesia Plan(s) and associated risks, benefits, and realistic alternatives discussed. Questions answered and patient/representative(s) expressed understanding.     - Discussed:     - Discussed with:  Patient, Spouse       Use of blood products discussed: Yes.     - Discussed with: Patient.     - Consented: consented to blood products     Postoperative Care       PONV prophylaxis: Ondansetron (or other 5HT-3)     Comments:    Other Comments: Discussed anesthesia options related to labor and delivery, including epidural consent discussion, as well as options for urgent procedural delivery including using preexisting epidural vs spinal anesthesia vs general anesthesia. Her induction began 11/26, OB called for an urgent CS given fetal intolerance, 11/27. A TAP " block consent discussion was had including risks and benefits and after consideration she agreed to proceed with TAP block following delivery.              Tom Chang MD    I have reviewed the pertinent notes and labs in the chart from the past 30 days and (re)examined the patient.  Any updates or changes from those notes are reflected in this note.     # Hyponatremia: Lowest Na = 133 mmol/L in last 2 days, will monitor as appropriate    # Hypercalcemia: corrected calcium is >10.1, will monitor as appropriate    # Hypoalbuminemia: Lowest albumin = 2.9 g/dL at 11/24/2024  8:44 AM, will monitor as appropriate     # Hypertension: Noted on problem list

## 2024-11-26 NOTE — PROGRESS NOTES
Brief Magnesium Check/ Labor Progress Note    S: Patient is doing well, headache improved. Denies blurry vision, SOB, N/V, RUQ or epigastric pain. No acute concerns.    O: /80   Pulse 80   Temp 98.4  F (36.9  C) (Oral)   Resp 16   Wt 86.1 kg (189 lb 12.8 oz)   LMP 2024 (Exact Date)   SpO2 98%   BMI 35.86 kg/m      General: NAD  CV: RRR, no murmurs, rubs, or gallops  Resp: CTAB anteriorly, no wheezes, rales, or rhonchi  Ext: Reflexes 2+ at brachioradialis,  trace edema on bilateral LE    UOP: Adequate    FHT: Cat 1, with periods of minimal variability as anticipated with Magnesium.    A/P: 44 year old  at 33w5d being induced for pre-eclampsia with severe features. Tolerating magnesium without complaint. Currently undergoing cervical ripening with PV misoprostol.    # IOL  - Continue cervical ripening with PV misoprostol  - Will assess for balloon placement in the AM    # Pre- eclampsia with severe features  - Mag started at 1436; currently running. Plan to continue until 24 hours postpartum.  - Next clinical Magnesium check at 0530    Dianne Omalley MD  Ob/Gyn Resident, PGY-3  2024 1:28 AM

## 2024-11-26 NOTE — PROGRESS NOTES
Brief Magnesium Check/ Labor Progress Note    S: Doing okay. Feeling some intermittent tightening. No HA, vision changes, CP or SOB. Did start feeling more weak a bit ago before magnesium was stopped.     O: /79   Pulse 90   Temp 98.2  F (36.8  C) (Oral)   Resp 16   Wt 86.1 kg (189 lb 12.8 oz)   LMP 2024 (Exact Date)   SpO2 99%   BMI 35.86 kg/m      General: NAD  CV: regular rate, warm and well perfused   Resp:  breathing comfortably on room air   Ext: Reflexes 2+ at brachioradialis,  trace edema on bilateral LE    UOP: Adequate    SVE: /-2, magdaleno placed with 30 ml     FHT: Cat 1, with periods of minimal variability as anticipated with Magnesium.    A/P: 44 year old  at 33w5d being induced for pre-eclampsia with severe features. Tolerating magnesium without complaint. Currently undergoing cervical ripening with PV misoprostol.    # IOL  - magdaleno in place with 30 ml   - Continue cervical ripening with PV misoprostol  - Will assess for balloon placement in the AM    # Pre- eclampsia with severe features  - Mag started at 1436 (1126)  - currently paused 2/2 weakness; magnesium level returned at 7.0, exam appropriate; will re-start at 1g/h and re-check level in 6 hours   - repeat magnesium level 1800   - Next clinical Magnesium check at 1600    Verna Duong MD  OB/GYN PGY-3  2024 11:57 AM     Appreciate Dr. Duong's note above, patient also seen and examined by me. I agree with the note above.  I was present for and assisted with placement of the magdaleno balloon.  I reviewed the IOL process and discussed with patient possibility of  section if desired.  She prefers to continue with IOL as long and maternal and fetal status are appropriate.  Amy Beauchamp MD

## 2024-11-26 NOTE — PROGRESS NOTES
Brief Labor Note    Patient has no acute complaints. Headache stable.    SVE: c/20/-3    FHT: Cat 1    Will start with PV miso.      Dianne Omalley MD  Ob/Gyn Resident, PGY-3  11/25/2024 9:20 PM

## 2024-11-26 NOTE — PLAN OF CARE
Patient states she is feeling weak. Reports she is having a hard time performing ADLs. Mag check performed. Mag gtt stopped and STAT mag level released. O2 98%, reflexes remain +1 bilaterally. Denies SOB. Dr. Beauchamp and Dr. Duong notified. Plan of care ongoing.

## 2024-11-26 NOTE — PROGRESS NOTES
Brief Note    In to check in with patient regarding given headache has returned. Currently rated at 4/10.  Of note, patient has migraines at baseline, which worsened during her first trimester and mostly resolved in her second and third trimester. However, headaches have again been persistent over the last few days. Briefly resolved earlier today, but now recurring with similar characteristics as her earlier headache, similar symptoms as her Migraines, with pain mostly on one side. Denies blurry vision, n/v, SOB/chest pain, or RUQ pain.    /84   Pulse (!) 16   Temp 98  F (36.7  C) (Oral)   Resp 16   Wt 86.1 kg (189 lb 12.8 oz)   LMP 04/04/2024 (Exact Date)   SpO2 99%   BMI 35.86 kg/m       Gen: Appears unwell, uncomfortable  Cardio: RRR (HR charted incorrectly in vitals listed above)  Pulm: CTAB anteriorly  Abdomen: No RUQ tenderness  Ext: Trace LE edema    Given multiple sustained severe ranges requiring IV antihypertensives, persistent headache, and steadily increasing LFTs will move towards delivery due to suspicion of worsening severe pre-eclampsia. Risk/benefit of waiting until 34 weeks assessed and there is little to gain (given patient has already received BMZ) in the setting of worsening clinical status.    Cephalic on BSUS and head below large left lateral fibroid.    Will transfer to labor floor for IOL.    Plan discussed with Dr. Lani Andrade.    Dianne Omalley MD  Ob/Gyn Resident, PGY-3  11/25/2024 6:54 PM

## 2024-11-27 LAB
ALBUMIN SERPL BCG-MCNC: 3.1 G/DL (ref 3.5–5.2)
ALP SERPL-CCNC: 147 U/L (ref 40–150)
ALT SERPL W P-5'-P-CCNC: 69 U/L (ref 0–50)
ANION GAP SERPL CALCULATED.3IONS-SCNC: 15 MMOL/L (ref 7–15)
AST SERPL W P-5'-P-CCNC: 51 U/L (ref 0–45)
BILIRUB SERPL-MCNC: 0.2 MG/DL
BUN SERPL-MCNC: 10.8 MG/DL (ref 6–20)
CALCIUM SERPL-MCNC: 7.9 MG/DL (ref 8.8–10.4)
CHLORIDE SERPL-SCNC: 101 MMOL/L (ref 98–107)
CREAT SERPL-MCNC: 0.82 MG/DL (ref 0.51–0.95)
EGFRCR SERPLBLD CKD-EPI 2021: >90 ML/MIN/1.73M2
ERYTHROCYTE [DISTWIDTH] IN BLOOD BY AUTOMATED COUNT: 14.3 % (ref 10–15)
GLUCOSE BLDC GLUCOMTR-MCNC: 115 MG/DL (ref 70–99)
GLUCOSE SERPL-MCNC: 92 MG/DL (ref 70–99)
HCO3 SERPL-SCNC: 19 MMOL/L (ref 22–29)
HCT VFR BLD AUTO: 37.9 % (ref 35–47)
HGB BLD-MCNC: 13 G/DL (ref 11.7–15.7)
MAGNESIUM SERPL-MCNC: 5.7 MG/DL (ref 1.7–2.3)
MCH RBC QN AUTO: 30.2 PG (ref 26.5–33)
MCHC RBC AUTO-ENTMCNC: 34.3 G/DL (ref 31.5–36.5)
MCV RBC AUTO: 88 FL (ref 78–100)
PLATELET # BLD AUTO: 200 10E3/UL (ref 150–450)
POTASSIUM SERPL-SCNC: 4.2 MMOL/L (ref 3.4–5.3)
PROT SERPL-MCNC: 6.5 G/DL (ref 6.4–8.3)
RBC # BLD AUTO: 4.3 10E6/UL (ref 3.8–5.2)
SODIUM SERPL-SCNC: 135 MMOL/L (ref 135–145)
WBC # BLD AUTO: 14.2 10E3/UL (ref 4–11)

## 2024-11-27 PROCEDURE — 250N000011 HC RX IP 250 OP 636: Performed by: STUDENT IN AN ORGANIZED HEALTH CARE EDUCATION/TRAINING PROGRAM

## 2024-11-27 PROCEDURE — 360N000076 HC SURGERY LEVEL 3, PER MIN: Performed by: STUDENT IN AN ORGANIZED HEALTH CARE EDUCATION/TRAINING PROGRAM

## 2024-11-27 PROCEDURE — 258N000003 HC RX IP 258 OP 636: Performed by: STUDENT IN AN ORGANIZED HEALTH CARE EDUCATION/TRAINING PROGRAM

## 2024-11-27 PROCEDURE — 82435 ASSAY OF BLOOD CHLORIDE: CPT

## 2024-11-27 PROCEDURE — 710N000010 HC RECOVERY PHASE 1, LEVEL 2, PER MIN: Performed by: STUDENT IN AN ORGANIZED HEALTH CARE EDUCATION/TRAINING PROGRAM

## 2024-11-27 PROCEDURE — 250N000013 HC RX MED GY IP 250 OP 250 PS 637

## 2024-11-27 PROCEDURE — 250N000011 HC RX IP 250 OP 636

## 2024-11-27 PROCEDURE — 271N000001 HC OR GENERAL SUPPLY NON-STERILE: Performed by: STUDENT IN AN ORGANIZED HEALTH CARE EDUCATION/TRAINING PROGRAM

## 2024-11-27 PROCEDURE — 85018 HEMOGLOBIN: CPT

## 2024-11-27 PROCEDURE — 59514 CESAREAN DELIVERY ONLY: CPT | Mod: GC | Performed by: STUDENT IN AN ORGANIZED HEALTH CARE EDUCATION/TRAINING PROGRAM

## 2024-11-27 PROCEDURE — 370N000017 HC ANESTHESIA TECHNICAL FEE, PER MIN: Performed by: STUDENT IN AN ORGANIZED HEALTH CARE EDUCATION/TRAINING PROGRAM

## 2024-11-27 PROCEDURE — 258N000003 HC RX IP 258 OP 636

## 2024-11-27 PROCEDURE — 36415 COLL VENOUS BLD VENIPUNCTURE: CPT

## 2024-11-27 PROCEDURE — 258N000003 HC RX IP 258 OP 636: Performed by: OBSTETRICS & GYNECOLOGY

## 2024-11-27 PROCEDURE — 120N000002 HC R&B MED SURG/OB UMMC

## 2024-11-27 PROCEDURE — 250N000009 HC RX 250

## 2024-11-27 PROCEDURE — 85041 AUTOMATED RBC COUNT: CPT

## 2024-11-27 PROCEDURE — 999N000141 HC STATISTIC PRE-PROCEDURE NURSING ASSESSMENT: Performed by: STUDENT IN AN ORGANIZED HEALTH CARE EDUCATION/TRAINING PROGRAM

## 2024-11-27 PROCEDURE — 250N000009 HC RX 250: Performed by: STUDENT IN AN ORGANIZED HEALTH CARE EDUCATION/TRAINING PROGRAM

## 2024-11-27 PROCEDURE — 88307 TISSUE EXAM BY PATHOLOGIST: CPT | Mod: TC

## 2024-11-27 PROCEDURE — 82947 ASSAY GLUCOSE BLOOD QUANT: CPT

## 2024-11-27 PROCEDURE — C9290 INJ, BUPIVACAINE LIPOSOME: HCPCS | Performed by: STUDENT IN AN ORGANIZED HEALTH CARE EDUCATION/TRAINING PROGRAM

## 2024-11-27 PROCEDURE — 88307 TISSUE EXAM BY PATHOLOGIST: CPT | Mod: 26 | Performed by: STUDENT IN AN ORGANIZED HEALTH CARE EDUCATION/TRAINING PROGRAM

## 2024-11-27 PROCEDURE — 82247 BILIRUBIN TOTAL: CPT

## 2024-11-27 PROCEDURE — 83735 ASSAY OF MAGNESIUM: CPT

## 2024-11-27 PROCEDURE — 272N000001 HC OR GENERAL SUPPLY STERILE: Performed by: STUDENT IN AN ORGANIZED HEALTH CARE EDUCATION/TRAINING PROGRAM

## 2024-11-27 RX ORDER — METHYLERGONOVINE MALEATE 0.2 MG/ML
200 INJECTION INTRAVENOUS
Status: DISCONTINUED | OUTPATIENT
Start: 2024-11-27 | End: 2024-12-01 | Stop reason: HOSPADM

## 2024-11-27 RX ORDER — SODIUM CHLORIDE, SODIUM LACTATE, POTASSIUM CHLORIDE, CALCIUM CHLORIDE 600; 310; 30; 20 MG/100ML; MG/100ML; MG/100ML; MG/100ML
10-125 INJECTION, SOLUTION INTRAVENOUS CONTINUOUS
Status: DISCONTINUED | OUTPATIENT
Start: 2024-11-27 | End: 2024-11-30

## 2024-11-27 RX ORDER — MISOPROSTOL 200 UG/1
800 TABLET ORAL
Status: DISCONTINUED | OUTPATIENT
Start: 2024-11-27 | End: 2024-12-01 | Stop reason: HOSPADM

## 2024-11-27 RX ORDER — PROCHLORPERAZINE MALEATE 10 MG
10 TABLET ORAL EVERY 6 HOURS PRN
Status: DISCONTINUED | OUTPATIENT
Start: 2024-11-27 | End: 2024-12-01 | Stop reason: HOSPADM

## 2024-11-27 RX ORDER — METHYLERGONOVINE MALEATE 0.2 MG/ML
200 INJECTION INTRAVENOUS
Status: DISCONTINUED | OUTPATIENT
Start: 2024-11-27 | End: 2024-11-27 | Stop reason: HOSPADM

## 2024-11-27 RX ORDER — AZITHROMYCIN 500 MG/5ML
500 INJECTION, POWDER, LYOPHILIZED, FOR SOLUTION INTRAVENOUS
Status: COMPLETED | OUTPATIENT
Start: 2024-11-27 | End: 2024-11-27

## 2024-11-27 RX ORDER — FENTANYL CITRATE 50 UG/ML
INJECTION, SOLUTION INTRAMUSCULAR; INTRAVENOUS
Status: COMPLETED | OUTPATIENT
Start: 2024-11-27 | End: 2024-11-27

## 2024-11-27 RX ORDER — ACETAMINOPHEN 325 MG/1
975 TABLET ORAL ONCE
Status: COMPLETED | OUTPATIENT
Start: 2024-11-27 | End: 2024-11-27

## 2024-11-27 RX ORDER — SODIUM CHLORIDE, SODIUM LACTATE, POTASSIUM CHLORIDE, CALCIUM CHLORIDE 600; 310; 30; 20 MG/100ML; MG/100ML; MG/100ML; MG/100ML
INJECTION, SOLUTION INTRAVENOUS
Status: COMPLETED
Start: 2024-11-27 | End: 2024-11-27

## 2024-11-27 RX ORDER — MISOPROSTOL 200 UG/1
400 TABLET ORAL
Status: DISCONTINUED | OUTPATIENT
Start: 2024-11-27 | End: 2024-11-27 | Stop reason: HOSPADM

## 2024-11-27 RX ORDER — MAGNESIUM HYDROXIDE 1200 MG/15ML
LIQUID ORAL PRN
Status: DISCONTINUED | OUTPATIENT
Start: 2024-11-27 | End: 2024-11-27

## 2024-11-27 RX ORDER — MORPHINE SULFATE 1 MG/ML
INJECTION, SOLUTION EPIDURAL; INTRATHECAL; INTRAVENOUS
Status: COMPLETED | OUTPATIENT
Start: 2024-11-27 | End: 2024-11-27

## 2024-11-27 RX ORDER — LOPERAMIDE HYDROCHLORIDE 2 MG/1
2 CAPSULE ORAL
Status: DISCONTINUED | OUTPATIENT
Start: 2024-11-27 | End: 2024-12-01 | Stop reason: HOSPADM

## 2024-11-27 RX ORDER — OXYTOCIN 10 [USP'U]/ML
10 INJECTION, SOLUTION INTRAMUSCULAR; INTRAVENOUS
Status: DISCONTINUED | OUTPATIENT
Start: 2024-11-27 | End: 2024-11-27 | Stop reason: HOSPADM

## 2024-11-27 RX ORDER — AMOXICILLIN 250 MG
2 CAPSULE ORAL 2 TIMES DAILY
Status: DISCONTINUED | OUTPATIENT
Start: 2024-11-27 | End: 2024-12-01 | Stop reason: HOSPADM

## 2024-11-27 RX ORDER — KETOROLAC TROMETHAMINE 30 MG/ML
30 INJECTION, SOLUTION INTRAMUSCULAR; INTRAVENOUS EVERY 6 HOURS
Status: COMPLETED | OUTPATIENT
Start: 2024-11-27 | End: 2024-11-28

## 2024-11-27 RX ORDER — IBUPROFEN 800 MG/1
800 TABLET, FILM COATED ORAL EVERY 6 HOURS
Status: DISCONTINUED | OUTPATIENT
Start: 2024-11-28 | End: 2024-12-01 | Stop reason: HOSPADM

## 2024-11-27 RX ORDER — ACETAMINOPHEN 325 MG/1
975 TABLET ORAL EVERY 6 HOURS
Status: DISCONTINUED | OUTPATIENT
Start: 2024-11-27 | End: 2024-12-01 | Stop reason: HOSPADM

## 2024-11-27 RX ORDER — ONDANSETRON 4 MG/1
4 TABLET, ORALLY DISINTEGRATING ORAL EVERY 6 HOURS PRN
Status: DISCONTINUED | OUTPATIENT
Start: 2024-11-27 | End: 2024-12-01 | Stop reason: HOSPADM

## 2024-11-27 RX ORDER — CARBOPROST TROMETHAMINE 250 UG/ML
250 INJECTION, SOLUTION INTRAMUSCULAR
Status: DISCONTINUED | OUTPATIENT
Start: 2024-11-27 | End: 2024-11-27 | Stop reason: HOSPADM

## 2024-11-27 RX ORDER — OXYTOCIN/0.9 % SODIUM CHLORIDE 30/500 ML
340 PLASTIC BAG, INJECTION (ML) INTRAVENOUS CONTINUOUS PRN
Status: DISCONTINUED | OUTPATIENT
Start: 2024-11-27 | End: 2024-11-27 | Stop reason: HOSPADM

## 2024-11-27 RX ORDER — TRANEXAMIC ACID 10 MG/ML
1 INJECTION, SOLUTION INTRAVENOUS EVERY 30 MIN PRN
Status: DISCONTINUED | OUTPATIENT
Start: 2024-11-27 | End: 2024-11-27 | Stop reason: HOSPADM

## 2024-11-27 RX ORDER — HYDROCORTISONE 25 MG/G
CREAM TOPICAL 3 TIMES DAILY PRN
Status: DISCONTINUED | OUTPATIENT
Start: 2024-11-27 | End: 2024-12-01 | Stop reason: HOSPADM

## 2024-11-27 RX ORDER — AMOXICILLIN 250 MG
1 CAPSULE ORAL 2 TIMES DAILY
Status: DISCONTINUED | OUTPATIENT
Start: 2024-11-27 | End: 2024-12-01 | Stop reason: HOSPADM

## 2024-11-27 RX ORDER — ENOXAPARIN SODIUM 100 MG/ML
40 INJECTION SUBCUTANEOUS EVERY 24 HOURS
Status: DISCONTINUED | OUTPATIENT
Start: 2024-11-28 | End: 2024-12-01 | Stop reason: HOSPADM

## 2024-11-27 RX ORDER — BUPIVACAINE HYDROCHLORIDE 7.5 MG/ML
INJECTION, SOLUTION INTRASPINAL
Status: COMPLETED | OUTPATIENT
Start: 2024-11-27 | End: 2024-11-27

## 2024-11-27 RX ORDER — ONDANSETRON 2 MG/ML
INJECTION INTRAMUSCULAR; INTRAVENOUS PRN
Status: DISCONTINUED | OUTPATIENT
Start: 2024-11-27 | End: 2024-11-27

## 2024-11-27 RX ORDER — SIMETHICONE 80 MG
80 TABLET,CHEWABLE ORAL 4 TIMES DAILY PRN
Status: DISCONTINUED | OUTPATIENT
Start: 2024-11-27 | End: 2024-12-01 | Stop reason: HOSPADM

## 2024-11-27 RX ORDER — LOPERAMIDE HYDROCHLORIDE 2 MG/1
4 CAPSULE ORAL
Status: DISCONTINUED | OUTPATIENT
Start: 2024-11-27 | End: 2024-12-01 | Stop reason: HOSPADM

## 2024-11-27 RX ORDER — BUPIVACAINE HYDROCHLORIDE AND EPINEPHRINE 2.5; 5 MG/ML; UG/ML
INJECTION, SOLUTION INFILTRATION; PERINEURAL
Status: DISCONTINUED | OUTPATIENT
Start: 2024-11-27 | End: 2024-11-27

## 2024-11-27 RX ORDER — OXYCODONE HYDROCHLORIDE 5 MG/1
5 TABLET ORAL EVERY 4 HOURS PRN
Status: DISCONTINUED | OUTPATIENT
Start: 2024-11-27 | End: 2024-12-01 | Stop reason: HOSPADM

## 2024-11-27 RX ORDER — MODIFIED LANOLIN
OINTMENT (GRAM) TOPICAL
Status: DISCONTINUED | OUTPATIENT
Start: 2024-11-27 | End: 2024-12-01 | Stop reason: HOSPADM

## 2024-11-27 RX ORDER — TRANEXAMIC ACID 10 MG/ML
1 INJECTION, SOLUTION INTRAVENOUS EVERY 30 MIN PRN
Status: DISCONTINUED | OUTPATIENT
Start: 2024-11-27 | End: 2024-12-01 | Stop reason: HOSPADM

## 2024-11-27 RX ORDER — ONDANSETRON 2 MG/ML
4 INJECTION INTRAMUSCULAR; INTRAVENOUS EVERY 6 HOURS PRN
Status: DISCONTINUED | OUTPATIENT
Start: 2024-11-27 | End: 2024-12-01 | Stop reason: HOSPADM

## 2024-11-27 RX ORDER — DIPHENHYDRAMINE HYDROCHLORIDE 50 MG/ML
25 INJECTION INTRAMUSCULAR; INTRAVENOUS EVERY 6 HOURS PRN
Status: DISCONTINUED | OUTPATIENT
Start: 2024-11-27 | End: 2024-12-01 | Stop reason: HOSPADM

## 2024-11-27 RX ORDER — MISOPROSTOL 200 UG/1
800 TABLET ORAL
Status: DISCONTINUED | OUTPATIENT
Start: 2024-11-27 | End: 2024-11-27 | Stop reason: HOSPADM

## 2024-11-27 RX ORDER — SODIUM PHOSPHATE,MONO-DIBASIC 19G-7G/118
1 ENEMA (ML) RECTAL DAILY PRN
Status: DISCONTINUED | OUTPATIENT
Start: 2024-11-29 | End: 2024-12-01 | Stop reason: HOSPADM

## 2024-11-27 RX ORDER — DIPHENHYDRAMINE HCL 25 MG
25 CAPSULE ORAL EVERY 6 HOURS PRN
Status: DISCONTINUED | OUTPATIENT
Start: 2024-11-27 | End: 2024-12-01 | Stop reason: HOSPADM

## 2024-11-27 RX ORDER — CEFAZOLIN SODIUM/WATER 2 G/20 ML
2 SYRINGE (ML) INTRAVENOUS
Status: COMPLETED | OUTPATIENT
Start: 2024-11-27 | End: 2024-11-27

## 2024-11-27 RX ORDER — NIFEDIPINE 30 MG/1
30 TABLET, EXTENDED RELEASE ORAL ONCE
Status: COMPLETED | OUTPATIENT
Start: 2024-11-27 | End: 2024-11-27

## 2024-11-27 RX ORDER — LOPERAMIDE HYDROCHLORIDE 2 MG/1
2 CAPSULE ORAL
Status: DISCONTINUED | OUTPATIENT
Start: 2024-11-27 | End: 2024-11-27 | Stop reason: HOSPADM

## 2024-11-27 RX ORDER — LOPERAMIDE HYDROCHLORIDE 2 MG/1
4 CAPSULE ORAL
Status: DISCONTINUED | OUTPATIENT
Start: 2024-11-27 | End: 2024-11-27 | Stop reason: HOSPADM

## 2024-11-27 RX ORDER — CARBOPROST TROMETHAMINE 250 UG/ML
250 INJECTION, SOLUTION INTRAMUSCULAR
Status: DISCONTINUED | OUTPATIENT
Start: 2024-11-27 | End: 2024-12-01 | Stop reason: HOSPADM

## 2024-11-27 RX ORDER — OXYTOCIN/0.9 % SODIUM CHLORIDE 30/500 ML
100-340 PLASTIC BAG, INJECTION (ML) INTRAVENOUS CONTINUOUS PRN
Status: CANCELLED | OUTPATIENT
Start: 2024-11-27

## 2024-11-27 RX ORDER — CITRIC ACID/SODIUM CITRATE 334-500MG
30 SOLUTION, ORAL ORAL
Status: COMPLETED | OUTPATIENT
Start: 2024-11-27 | End: 2024-11-27

## 2024-11-27 RX ORDER — METOCLOPRAMIDE HYDROCHLORIDE 5 MG/ML
10 INJECTION INTRAMUSCULAR; INTRAVENOUS EVERY 6 HOURS PRN
Status: DISCONTINUED | OUTPATIENT
Start: 2024-11-27 | End: 2024-12-01 | Stop reason: HOSPADM

## 2024-11-27 RX ORDER — OXYTOCIN 10 [USP'U]/ML
10 INJECTION, SOLUTION INTRAMUSCULAR; INTRAVENOUS
Status: CANCELLED | OUTPATIENT
Start: 2024-11-27

## 2024-11-27 RX ORDER — METOCLOPRAMIDE 10 MG/1
10 TABLET ORAL EVERY 6 HOURS PRN
Status: DISCONTINUED | OUTPATIENT
Start: 2024-11-27 | End: 2024-12-01 | Stop reason: HOSPADM

## 2024-11-27 RX ORDER — LIDOCAINE 40 MG/G
CREAM TOPICAL
Status: DISCONTINUED | OUTPATIENT
Start: 2024-11-27 | End: 2024-12-01 | Stop reason: HOSPADM

## 2024-11-27 RX ORDER — CEFAZOLIN SODIUM/WATER 2 G/20 ML
2 SYRINGE (ML) INTRAVENOUS SEE ADMIN INSTRUCTIONS
Status: DISCONTINUED | OUTPATIENT
Start: 2024-11-27 | End: 2024-11-27 | Stop reason: HOSPADM

## 2024-11-27 RX ORDER — OXYTOCIN/0.9 % SODIUM CHLORIDE 30/500 ML
340 PLASTIC BAG, INJECTION (ML) INTRAVENOUS CONTINUOUS PRN
Status: DISCONTINUED | OUTPATIENT
Start: 2024-11-27 | End: 2024-12-01 | Stop reason: HOSPADM

## 2024-11-27 RX ORDER — LIDOCAINE 40 MG/G
CREAM TOPICAL
Status: DISCONTINUED | OUTPATIENT
Start: 2024-11-27 | End: 2024-11-27 | Stop reason: HOSPADM

## 2024-11-27 RX ORDER — BISACODYL 10 MG
10 SUPPOSITORY, RECTAL RECTAL DAILY PRN
Status: DISCONTINUED | OUTPATIENT
Start: 2024-11-29 | End: 2024-12-01 | Stop reason: HOSPADM

## 2024-11-27 RX ORDER — MISOPROSTOL 200 UG/1
400 TABLET ORAL
Status: DISCONTINUED | OUTPATIENT
Start: 2024-11-27 | End: 2024-12-01 | Stop reason: HOSPADM

## 2024-11-27 RX ORDER — SODIUM CHLORIDE, SODIUM LACTATE, POTASSIUM CHLORIDE, CALCIUM CHLORIDE 600; 310; 30; 20 MG/100ML; MG/100ML; MG/100ML; MG/100ML
INJECTION, SOLUTION INTRAVENOUS CONTINUOUS
Status: DISCONTINUED | OUTPATIENT
Start: 2024-11-27 | End: 2024-11-27 | Stop reason: HOSPADM

## 2024-11-27 RX ORDER — OXYTOCIN 10 [USP'U]/ML
10 INJECTION, SOLUTION INTRAMUSCULAR; INTRAVENOUS
Status: DISCONTINUED | OUTPATIENT
Start: 2024-11-27 | End: 2024-12-01 | Stop reason: HOSPADM

## 2024-11-27 RX ADMIN — MORPHINE SULFATE 0.15 MG: 1 INJECTION EPIDURAL; INTRATHECAL; INTRAVENOUS at 09:00

## 2024-11-27 RX ADMIN — ACETAMINOPHEN 975 MG: 325 TABLET, FILM COATED ORAL at 14:55

## 2024-11-27 RX ADMIN — BUPIVACAINE HYDROCHLORIDE AND EPINEPHRINE BITARTRATE 20 ML: 2.5; .005 INJECTION, SOLUTION INFILTRATION; PERINEURAL at 10:20

## 2024-11-27 RX ADMIN — LEVOTHYROXINE SODIUM 112 MCG: 112 TABLET ORAL at 05:50

## 2024-11-27 RX ADMIN — MAGNESIUM SULFATE HEPTAHYDRATE 1 G/HR: 40 INJECTION, SOLUTION INTRAVENOUS at 22:47

## 2024-11-27 RX ADMIN — NIFEDIPINE 30 MG: 30 TABLET, FILM COATED, EXTENDED RELEASE ORAL at 18:31

## 2024-11-27 RX ADMIN — BUPIVACAINE 20 ML: 13.3 INJECTION, SUSPENSION, LIPOSOMAL INFILTRATION at 10:20

## 2024-11-27 RX ADMIN — ACETAMINOPHEN 975 MG: 325 TABLET, FILM COATED ORAL at 08:44

## 2024-11-27 RX ADMIN — KETOROLAC TROMETHAMINE 30 MG: 30 INJECTION, SOLUTION INTRAMUSCULAR at 22:39

## 2024-11-27 RX ADMIN — Medication 600 ML/HR: at 09:21

## 2024-11-27 RX ADMIN — SIMETHICONE 80 MG: 80 TABLET, CHEWABLE ORAL at 20:04

## 2024-11-27 RX ADMIN — FENTANYL CITRATE 15 MCG: 50 INJECTION INTRAMUSCULAR; INTRAVENOUS at 09:00

## 2024-11-27 RX ADMIN — SODIUM CHLORIDE, POTASSIUM CHLORIDE, SODIUM LACTATE AND CALCIUM CHLORIDE: 600; 310; 30; 20 INJECTION, SOLUTION INTRAVENOUS at 06:37

## 2024-11-27 RX ADMIN — Medication 500 MG: at 08:53

## 2024-11-27 RX ADMIN — ACETAMINOPHEN 975 MG: 325 TABLET, FILM COATED ORAL at 22:39

## 2024-11-27 RX ADMIN — LABETALOL HYDROCHLORIDE 400 MG: 200 TABLET, FILM COATED ORAL at 14:02

## 2024-11-27 RX ADMIN — SODIUM CHLORIDE, POTASSIUM CHLORIDE, SODIUM LACTATE AND CALCIUM CHLORIDE 75 ML/HR: 600; 310; 30; 20 INJECTION, SOLUTION INTRAVENOUS at 15:20

## 2024-11-27 RX ADMIN — SODIUM CHLORIDE, POTASSIUM CHLORIDE, SODIUM LACTATE AND CALCIUM CHLORIDE: 600; 310; 30; 20 INJECTION, SOLUTION INTRAVENOUS at 08:51

## 2024-11-27 RX ADMIN — SODIUM CITRATE AND CITRIC ACID MONOHYDRATE 30 ML: 500; 334 SOLUTION ORAL at 08:42

## 2024-11-27 RX ADMIN — KETOROLAC TROMETHAMINE 30 MG: 30 INJECTION, SOLUTION INTRAMUSCULAR at 16:38

## 2024-11-27 RX ADMIN — NIFEDIPINE 30 MG: 30 TABLET, FILM COATED, EXTENDED RELEASE ORAL at 05:49

## 2024-11-27 RX ADMIN — SODIUM CHLORIDE, POTASSIUM CHLORIDE, SODIUM LACTATE AND CALCIUM CHLORIDE 75 ML/HR: 600; 310; 30; 20 INJECTION, SOLUTION INTRAVENOUS at 19:48

## 2024-11-27 RX ADMIN — BUPIVACAINE HYDROCHLORIDE IN DEXTROSE 1.8 ML: 7.5 INJECTION, SOLUTION SUBARACHNOID at 09:00

## 2024-11-27 RX ADMIN — LABETALOL HYDROCHLORIDE 400 MG: 200 TABLET, FILM COATED ORAL at 20:04

## 2024-11-27 RX ADMIN — MAGNESIUM SULFATE HEPTAHYDRATE 1 G/HR: 40 INJECTION, SOLUTION INTRAVENOUS at 07:48

## 2024-11-27 RX ADMIN — Medication 2 G: at 08:53

## 2024-11-27 RX ADMIN — SODIUM CHLORIDE, POTASSIUM CHLORIDE, SODIUM LACTATE AND CALCIUM CHLORIDE 1000 ML: 600; 310; 30; 20 INJECTION, SOLUTION INTRAVENOUS at 19:51

## 2024-11-27 RX ADMIN — SENNOSIDES AND DOCUSATE SODIUM 2 TABLET: 50; 8.6 TABLET ORAL at 20:04

## 2024-11-27 RX ADMIN — ONDANSETRON 4 MG: 2 INJECTION INTRAMUSCULAR; INTRAVENOUS at 08:52

## 2024-11-27 RX ADMIN — PHENYLEPHRINE HYDROCHLORIDE 50 MCG/MIN: 10 INJECTION INTRAVENOUS at 08:58

## 2024-11-27 ASSESSMENT — ACTIVITIES OF DAILY LIVING (ADL)
ADLS_ACUITY_SCORE: 21
ADLS_ACUITY_SCORE: 21
ADLS_ACUITY_SCORE: 26
ADLS_ACUITY_SCORE: 21
ADLS_ACUITY_SCORE: 39
ADLS_ACUITY_SCORE: 21
ADLS_ACUITY_SCORE: 39
ADLS_ACUITY_SCORE: 26
ADLS_ACUITY_SCORE: 21
ADLS_ACUITY_SCORE: 38
ADLS_ACUITY_SCORE: 39
ADLS_ACUITY_SCORE: 38
ADLS_ACUITY_SCORE: 26
ADLS_ACUITY_SCORE: 38
ADLS_ACUITY_SCORE: 21
ADLS_ACUITY_SCORE: 26
ADLS_ACUITY_SCORE: 38
ADLS_ACUITY_SCORE: 21
ADLS_ACUITY_SCORE: 38
ADLS_ACUITY_SCORE: 21
ADLS_ACUITY_SCORE: 39
ADLS_ACUITY_SCORE: 21
ADLS_ACUITY_SCORE: 39

## 2024-11-27 NOTE — PLAN OF CARE
Provider notification:   Provider: Laya Piña MD was notified/at bedside at 0810 regarding: a persistent Category II fetal heart rate tracing for 30 minutes.    Category II Algorithm     Fetal heart rate and uterine activity reviewed with provider.    EFM interpretation suggests: concern for persistent Category II tracing due to: minimal variability.  EFM suggests concern for interruption of the oxygen pathway due to:  late decelerations.     Interventions to improve fetal oxygenation for a Category II tracing include: continue monitoring, evaluate labor progress, maternal positioning, oxytocin discontinued, and sterile vaginal exam    After discussion with provider: MD decided to do stat CS for patient.    Plan per provider / orders received for Prepare patient for CS

## 2024-11-27 NOTE — ANESTHESIA CARE TRANSFER NOTE
Patient: Ravi Peñaloza    Procedure: Procedure(s):   section       Diagnosis: * No pre-op diagnosis entered *  Diagnosis Additional Information: No value filed.    Anesthesia Type:   Epidural     Note:    Oropharynx: oropharynx clear of all foreign objects and spontaneously breathing  Level of Consciousness: awake  Oxygen Supplementation: room air  Level of Supplemental Oxygen (L/min / FiO2): 0  Independent Airway: airway patency satisfactory and stable  Dentition: dentition unchanged  Vital Signs Stable: post-procedure vital signs reviewed and stable  Report to RN Given: handoff report given  Patient transferred to: PACU    Handoff Report: Identifed the Patient, Identified the Reponsible Provider, Reviewed the pertinent medical history, Discussed the surgical course, Reviewed Intra-OP anesthesia mangement and issues during anesthesia, Set expectations for post-procedure period and Allowed opportunity for questions and acknowledgement of understanding      Vitals:  Vitals Value Taken Time   BP     Temp     Pulse     Resp     SpO2 99 % 24 1033   Vitals shown include unfiled device data.    Electronically Signed By: Luis Carlos Caro MD  2024  10:34 AM

## 2024-11-27 NOTE — OP NOTE
[facility]  Full Operative Progress Note     Name: Ravi Peñaloza    MRN: [id number]    : 1980    Date of Surgery: 2024    Pre-operative Diagnosis:   -  at 33w6d  - Preeclampsia with severe features  - Severe fetal growth restriction  - Category 2 remote from delivery  - GDMA2  - 11cm fibroid    Post-operative Diagnosis:   - Same, now   - Liveborn female infant    Procedure(s): Primary low transverse  section with double layer uterine closure via Pfannenstiel skin incision      Surgeon:  Nilsa Crum MD     Assistants:  Shantell Rivas MD, PGY-2      Anesthesia: Spinal    QBL: 594 mL     Urine Output: 200 mL clear urine     Fluids: 1300 mL crystalloid    Medications: Ancef 2g, Azithromycin 500mg, Pitocin 30U    Drains: Gillespie catheter    Specimens: Placenta, cord segment    Complications: None apparent.      Indications:   Ravi Peñaloza is a 44 year old year old  at 33w6d who presented with sustained severe range blood pressures meeting criteria for preE w/ SF at 33w1d. She was managed inpatient until she had a persistent headache and delivery was recommended. She was induced and progressed to 3cm dilation and developed a persistent category 2 tracing which was unable to be resolved despite attempts ant intrauterine resuscitation. Recommendation at this point was made to proceed with CS for cat 2 remote from delivery.  The risks, benefits, and alternatives of  section were discussed with the patient, and she agreed to proceed.     Findings:   - A single liveborn female weighing 1470g with apgars of 9 and 9.  - Arterial cord blood: 7.29 pH, 3.5 base excess. Venous cord blood: 7.34 pH, 2.7 base excess.  - Uterus with large ~11cm intramural fibroid on left side of uterus. Normal appearing fallopian tubes, ovaries.    - No nuchal cord. Minimal clear amniotic fluid.   - No intraabdominal or recto-fascial adhesions.    Procedure Details:   The  patient was brought to the OR, where adequate spinal anesthesia was administered.  She was placed in the dorsal supine position with a slight leftward tilt. Gillespie catheter was placed in the bladder. She was prepped and draped in the usual sterile fashion. A surgical time out was performed. A pfannenstiel skin incision was made with the scalpel, and carried down to the underlying fascia with sharp and blunt dissection. The fascia was incised in the midline, and the incision was extended bluntly in the Bacilio Maloney fashion. The rectus muscles were  in the midline, and the peritoneum was entered bluntly, and the opening was extended with digital pressure and electrocautery. The bladder blade was placed. A transverse hysterotomy was made with the scalpel in the lower uterine segment, and the incision was extended with digital pressure. The infant was noted to be in the SELAM position, and was delivered atraumatically. The shoulders delivered easily.  No nuchal cord was noted. The cord was doubly clamped and cut after 40 second delay, and the infant was handed off to the awaiting NICU staff. A segment of cord was cut and sent for gases. The placenta was delivered with gentle traction on the umbilical cord and uterine massage. The uterus was unable to be exteriorized due to the large 11cm fibroid. A large Neil was placed in the abdomen The uterus was cleared of all clots and debris. Uterine tone was noted to be moderately firm with 30 units of pitocin given through the running IV and uterine massage.  The hysterotomy was closed with a running locked suture of 0 Vicryl.  The hysterotomy was then imbricated using an 0 Vicryl suture. The hysterotomy was noted to be hemostatic. The pericolic gutters were cleared of all clots and debris. The hysterotomy was reexamined and noted to be hemostatic. The Neil retractor was removed. The fascia and rectus muscles were examined and areas of oozing were controlled with  electrocautery. The fascia was closed with a running 0 Vicryl suture. The subcutaneous tissue was irrigated and areas of oozing were controlled with electrocautery. The subcutaneous tissue was greater than 2 cm in thickness, and was therefore closed with 3-0 Vicryl. The skin was closed with 4-0 Monocryl and covered with a sterile dressing.    All sponge, needle, and instrument counts were correct. The patient tolerated the procedure well, and was transferred to recovery in stable condition.    Nilsa Crum MD  [facility], Ob/Gyn  11/27/2024 9:53 AM

## 2024-11-27 NOTE — PROVIDER NOTIFICATION
11/27/24 0434   Provider Notification   Provider Name/Title Dr. Rivas, G2   Method of Notification Phone   Request Evaluate - Remote   Notification Reason Variability Change;Status Update;Other (Comment)  (Reviewed strip with Dr. Rivas)     Reviewed strip with Dr. Rivas, minimal variability most of the time, occasional accelerations but not in the last hour, no decels. at this time, provider wants to continue with the current plan with no interventions. Provider will update if anything changes. Plan is to continue monitoring

## 2024-11-27 NOTE — PROVIDER NOTIFICATION
11/27/24 0109   Provider Notification   Provider Name/Title Dr. Omalley, G3   Method of Notification Phone   Request Evaluate - Remote   Notification Reason Other (Comment)  (Strip review and asked if to bolus pt.)     Provider notification:   Provider: Dr. Omalley was notified at 0109 regarding: a persistent Category II fetal heart rate tracing for 45 minutes.    Category II Algorithm     Fetal heart rate and uterine activity reviewed with provider.  .     Interventions to improve fetal oxygenation for a Category II tracing include: continue monitoring, emotional support, evaluate labor progress, and maternal positioning    After discussion with provider: Continue to monitor, provider ok with strip for the moment due to pt being on mag.

## 2024-11-27 NOTE — PLAN OF CARE
Data: Ravi Peñaloza transferred to 7123 via bed at 1210. Baby in NICU, See NICU noted for more details.   Action: Receiving unit notified of transfer: Yes. Patient and family notified of room change. Report given to Karmen LINO RN at 1205. Belongings sent to receiving unit. Accompanied by Registered Nurse. Oriented patient to surroundings. Call light within reach.   Response: Patient tolerated transfer and is stable.

## 2024-11-27 NOTE — PROGRESS NOTES
Brief Magnesium Check/ Labor Progress Note    S: Doing well, sleeping on arrival, starting to feel contractions. Denies headache, vision change, chest pain, SOB.     O: /75 (BP Location: Right arm, Patient Position: Semi-Acevedo's, Cuff Size: Adult Regular)   Pulse 81   Temp 98.2  F (36.8  C) (Oral)   Resp 16   Wt 86.1 kg (189 lb 12.8 oz)   LMP 2024 (Exact Date)   SpO2 99%   BMI 35.86 kg/m      General: NAD  CV: RRR, warm and well perfused   Resp: CTAB, breathing comfortably on room air   Abdomen: No RUQ tenderness  Ext: Brachioradialis reflex 1+,  trace edema on bilateral LE    UOP: Adequate    SVE: Deferred    FHT: baseline 130, moderate variability with periods of minimal, accelerations, intermittent variable decelerations     A/P: 44 year old  at 33w6d being induced for pre-eclampsia with severe features. Tolerating magnesium without complaint.     # IOL  - s/p ripening with misoprostol and magdaleno balloon  - Continue augmentation with pitocin, currently at 18 mU    # Pre- eclampsia with severe features  - Mag started at 1436 (1126)  - Paused this AM 2/2 weakness which has since improved; mg level returned at 7.0. magnesium re-started at 1g/h. Exam reassuring against magnesium toxicity at this time   - repeat magnesium level  at 1800 was 5.8. Will continue infusion at 1g/hr.  - Next clinical Magnesium check at 0900    Dianne Omalley MD  Ob/Gyn Resident, PGY-3  2024 5:07 AM

## 2024-11-27 NOTE — PLAN OF CARE
[facility] Labor and Delivery Progress Note    Ravi Peñaloza MRN# [id number]   Age: 44 year old YOB: 1980           Subjective:              Objective:   Patient Vitals for the past 24 hrs:   BP Temp Temp src Pulse Resp SpO2 Oximeter Heart Rate   11/27/24 0557 -- 97.9  F (36.6  C) Oral -- -- -- --   11/27/24 0508 134/86 -- -- -- 16 -- 78 bpm   11/27/24 0434 -- 97.8  F (36.6  C) Oral -- -- -- --   11/27/24 0408 135/85 -- -- -- 15 -- 81 bpm   11/27/24 0308 128/73 98  F (36.7  C) Oral -- 16 -- 74 bpm   11/27/24 0236 -- 97.9  F (36.6  C) Oral -- -- -- --   11/27/24 0208 136/86 -- -- -- 15 -- 75 bpm   11/27/24 0111 -- -- -- -- -- 99 % --   11/27/24 0109 126/75 -- -- -- 16 -- 74 bpm   11/27/24 0008 110/64 -- -- -- 15 -- 77 bpm   11/26/24 2308 134/76 -- -- -- 15 -- 80 bpm   11/26/24 2108 (!) 152/78 -- -- -- 16 -- 72 bpm   11/26/24 2005 134/78 -- -- -- 15 100 % 76 bpm   11/26/24 1908 (!) 146/87 -- -- -- -- -- 80 bpm   11/26/24 1810 133/84 -- -- -- -- 99 % 77 bpm   11/26/24 1709 135/83 -- -- -- -- -- 71 bpm   11/26/24 1608 129/74 -- -- -- -- -- 75 bpm   11/26/24 1600 -- -- -- -- -- 99 % --   11/26/24 1508 121/86 -- -- -- -- -- 77 bpm   11/26/24 1432 -- -- -- -- -- 98 % 79 bpm   11/26/24 1403 120/76 -- -- 81 -- -- 77 bpm   11/26/24 1400 120/76 -- -- -- -- -- 83 bpm   11/26/24 1309 112/74 -- -- -- -- -- 76 bpm   11/26/24 1300 -- -- -- -- -- 99 % 78 bpm   11/26/24 1230 -- -- -- -- -- 98 % 71 bpm   11/26/24 1208 129/77 -- -- -- -- -- 73 bpm   11/26/24 1130 -- -- -- -- -- 100 % --   11/26/24 1100 115/79 -- -- -- -- 99 % 78 bpm   11/26/24 1038 -- -- -- -- -- 99 % 75 bpm   11/26/24 1030 -- -- -- -- -- 99 % 72 bpm   11/26/24 1000 117/78 -- -- -- -- -- 73 bpm   11/26/24 0900 (!) 133/92 -- -- -- -- -- --   11/26/24 0857 (!) 133/92 -- -- 90 -- -- --   11/26/24 0800 111/75 -- -- -- -- 99 % 82 bpm   11/26/24 0700 (!) 136/96 -- -- -- -- 99 % 88 bpm         Cervical Exam: 3 / 20% / -2          Membranes: Ruptured       Fetal Heart Rate:    Monitor: external US    Variability: minimal (detectable, amplitude less than or equal to 5 bpm)    Baseline Rate: normal range    Fetal Heart Rate Tracing: Cat 2, occasional accelerations. Providers aware. No new orders.          Assessment:   Ravi Peñaloza is a 44 year old  who is 33w6d here for an induction with severe features. Leaking, denies headache, vision changes, chest pain, SOB, epigastric pain. VSS, afebrile.           Plan:   cervical ripening with misoprostol x4, and cervical magdaleno bulb out  Labor induction with Pitocin on 18mU  On Magnesium 1G continues   Anticipate    Report given to NOEMI Lange.     Mariela Velazco RN

## 2024-11-27 NOTE — PLAN OF CARE
Goal Outcome Evaluation:    Plan of Care Reviewed With: patient, spouse    Overall Patient Progress: improvingOverall Patient Progress: improving  Problem: Postpartum ( Delivery)  Goal: Optimal Pain Control and Function  Intervention: Prevent or Manage Pain  Recent Flowsheet Documentation  Taken 2024 1455 by Karmen Vásquez RN  Pain Management Interventions: (Scheduled Tylenol) medication (see MAR)  Taken 2024 1238 by Karmen Vásquez RN  Perineal Care:   absorbent brief/pad changed   perineal hygiene encouraged    Data: Vital signs within normal limits except for softer recent BPs, see provider notification. Postpartum checks within normal limits - see flow record. Patient denies headache, vision changes, shortness of breath, and/or epigastric pain. PIV infusing with Magnesium Sulfate and Lactated Ringers. Patient eating and drinking normally. Patient able to ambulate without dizziness. Magdaleno intact, output adequate, per provider Dr. Blevins, plan to keep magdaleno in until post-magnesium sulfate. No apparent signs of infection. C/S dressing is clean, dry, and intact. Patient breast pumping for baby in NICU, getting a lot of colostrum.   Action: Pain has been adequately managed with oral Tylenol and IV Toradol. Abdominal binder on for additional comfort and support.   Response: Patient saw baby in the NICU before transfer to [facility]. Also has NICU video set-up on smart phone to see baby. Support person, :Stiven, present.   Plan: Continue with the plan of care.

## 2024-11-27 NOTE — ANESTHESIA PROCEDURE NOTES
"TAP Procedure Note    Pre-Procedure   Staff -        Anesthesiologist:  Bartolome Alfaro MD       Resident/Fellow: Luis Carlos Caro MD       Performed By: resident       Location: OR       Pre-Anesthestic Checklist: patient identified, IV checked, risks and benefits discussed, informed consent, monitors and equipment checked, pre-op evaluation, at physician/surgeon's request and post-op pain management  Timeout:       Correct Patient: Yes        Correct Procedure: Yes        Correct Site: Yes        Correct Position: Yes        Correct Laterality: Yes        Site Marked: Yes  Procedure Documentation  Procedure: TAP       Laterality: bilateral       Patient Position: supine       Skin prep: Chloraprep       Needle Type: short bevel       Needle Gauge: 20.        Needle Length (Inches): 3.13        Needle Length (millimeters): 110        Ultrasound guided       1. Ultrasound was used to identify targeted nerve, plexus, vascular marker, or fascial plane and place a needle adjacent to it in real-time.       2. Ultrasound was used to visualize the spread of anesthetic in close proximity to the above referenced structure.       4. The visualized anatomic structures appeared normal.       5. There were no apparent abnormal pathologic findings.    Assessment/Narrative         The placement was negative for: painful injection and site bleeding       Paresthesias: No.       Bolus given via needle. no blood aspirated via catheter.        Secured via.        Insertion/Infusion Method: Single Shot       Complications: none    Medication(s) Administered   Bupivacaine 0.25% w/ 1:200K Epi (Injection) - Injection   20 mL - 11/27/2024 10:20:00 AM  Bupivacaine liposome (Exparel) 1.3% LA inj susp (Infiltration) - Infiltration   20 mL - 11/27/2024 10:20:00 AM    FOR [facility] ([facility]) ONLY:   Pain Team Contact information: please page the Pain Team Via [employer]. Search \"Pain\". During daytime hours, please page the attending " first. At night please page the resident first.

## 2024-11-27 NOTE — PLAN OF CARE
Data: Pt to OB PACU at 1029 via cart. PIV infusing without complications, magdaleno with clear yellow urine to gravity, pt denies any pain, denies any nausea and vomiting.  Interventions: IV to pump, monitors and alarms on, SCD on.  Response: stable.  Plan: Patient instructed to notify RN for pain or nausea, routine post op cares, initiate breastfeeding/pumping as soon as patient/infant able.

## 2024-11-27 NOTE — PLAN OF CARE
Pre-operative preparation commenced.     Transferred care to NOEMI Steiner and NOEMI Osborn.        120

## 2024-11-27 NOTE — ANESTHESIA PROCEDURE NOTES
"Intrathecal injection Procedure Note    Pre-Procedure   Staff -        Anesthesiologist:  Bartolome Alfaro MD       Resident/Fellow: Luis Carlos Caro MD       Other Anesthesia Staff: Tom Chang MD       Performed By: resident       Location: OB       Pre-Anesthestic Checklist: patient identified, IV checked, risks and benefits discussed, informed consent, monitors and equipment checked, pre-op evaluation, at physician/surgeon's request and post-op pain management  Timeout:       Correct Patient: Yes        Correct Procedure: Yes        Correct Site: Yes        Correct Position: Yes   Procedure Documentation  Procedure: intrathecal injection       Patient Position: sitting       Patient Prep/Sterile Barriers: sterile gloves, mask, patient draped       Skin prep: Chloraprep       Insertion Site: L3-4. (midline approach).       Needle Gauge: 25.        Needle Length (Inches): 3.5        Spinal Needle Type: Pencan       Introducer used       Introducer: 20 G       # of attempts: 1 and  # of redirects:  1    Assessment/Narrative         Paresthesias: No.       Sensory Level: T6       CSF fluid: clear.    Medication(s) Administered   0.75% Hyperbaric Bupivacaine (Intrathecal) - Intrathecal   1.8 mL - 11/27/2024 9:00:00 AM  Morphine PF 1 mg/mL (Intrathecal) - Intrathecal   0.15 mg - 11/27/2024 9:00:00 AM  Fentanyl PF (Intrathecal) - Intrathecal   15 mcg - 11/27/2024 9:00:00 AM    FOR [facility] ([facility]) ONLY:   Pain Team Contact information: please page the Pain Team Via [employer]. Search \"Pain\". During daytime hours, please page the attending first. At night please page the resident first.      "

## 2024-11-27 NOTE — PROGRESS NOTES
Labor Note    S: Feeling some mild contractions.  O:   Vitals:    24 2108 24 2308   BP: 134/78 (!) 152/78 134/76   Pulse:      Resp:      Temp:      SpO2: 100%       FHT: baseline 130, minimal variability, accels present, decels absent  TOCO: Ctx Q4min  SVE: 3/-2 AROM performed clear fluid    A/P: 44 year old  @33w6d IOL for preeclampsia with SF  IOL: On pitocin @14mu/min, AROM performed for augmentation. Continue to titrate pitocin as needed.   Pre-e: On magnesium 1g/hr, BP normal-mild range. Treat as needed.   Pain: Currently unmedicated, hoping to avoid epidural. Pain meds per request.   GBS: Negative  Anticipate     Laura Deng MD

## 2024-11-27 NOTE — ANESTHESIA POSTPROCEDURE EVALUATION
Patient: Ravi Peñaloza    Procedure: Procedure(s):   section       Anesthesia Type:  Epidural    Note:  Disposition: Admission   Postop Pain Control: Uneventful            Sign Out: Well controlled pain   PONV: No   Neuro/Psych: Uneventful            Sign Out: Acceptable/Baseline neuro status   Airway/Respiratory: Uneventful            Sign Out: Acceptable/Baseline resp. status   CV/Hemodynamics: Uneventful            Sign Out: Acceptable CV status; No obvious hypovolemia; No obvious fluid overload   Other NRE: NONE   DID A NON-ROUTINE EVENT OCCUR? No           Last vitals:  Vitals Value Taken Time   /78 24 1115   Temp 36.6  C (97.9  F) 24 1045   Pulse 66 24 1126   Resp 13 24 1126   SpO2 99 % 24 1126   Vitals shown include unfiled device data.    Electronically Signed By: Bartolome Alfaro MD  2024  11:26 AM

## 2024-11-27 NOTE — PROGRESS NOTES
Brief Magnesium Check/ Labor Progress Note    S: Doing well, sleeping on arrival, not feeling contractions currently. Denies headache, vision change, chest pain, SOB.     O: /76   Pulse 81   Temp 98.2  F (36.8  C) (Oral)   Resp 16   Wt 86.1 kg (189 lb 12.8 oz)   LMP 2024 (Exact Date)   SpO2 100%   BMI 35.86 kg/m      General: NAD  CV: RRR, warm and well perfused   Resp: CTAB, breathing comfortably on room air   Abdomen: No RUQ tenderness  Ext: Brachioradialis reflex 2+,  1+ edema on bilateral LE    UOP: Adequate    SVE: Difficult exam, external os dilated, unable to get good assessment of internal os, patient also notably uncomfortable with exam.    FHT: baseline 130, moderate variability with periods of minimal, no accelerations, no decelerations     A/P: 44 year old  at 33w5d being induced for pre-eclampsia with severe features. Tolerating magnesium without complaint. Difficult cervical exam without complete assessment of cervical os. Will have staff check to make best plan for labor progress, given patient is currently at 14 mU of pitocin and not yet feeling contractions.    # IOL  - s/p ripening with misoprostol and magdaleno balloon  - Continue augmentation with pitocin  - Staff will do cervical assessment    # Pre- eclampsia with severe features  - Mag started at 1436 (1126)  - Paused this AM 2/2 weakness which has since improved; mg level returned at 7.0. magnesium re-started at 1g/h. Exam reassuring against magnesium toxicity at this time   - repeat magnesium level  at 1800 was 5.8. Will continue infusion at 1g/hr.  - Next clinical Magnesium check at 3896    Dianne Omalley MD  Ob/Gyn Resident, PGY-3  2024 12:11 AM

## 2024-11-27 NOTE — PLAN OF CARE
Goal Outcome Evaluation:  Visited baby on 11th floor NICU. Patient arrived to [facility] unit via zoom cart at 1235 ,with belongings, accompanied by spouse/ significant other, with infant in  NICU . Received report from NERY Bach&HERMES RN. Unit and room orientation  completed . Call light given and within arms reach; no concerns present at this time, notified provider via text page about two recent BP's being 119/100 and 138/101, no further orders at this time. Continue with plan of care.

## 2024-11-27 NOTE — ANESTHESIA PROCEDURE NOTES
"Intrathecal injection Procedure Note    Pre-Procedure   Staff -        Anesthesiologist:  Zoya Jensen MD       Resident/Fellow: Tom Chang MD       Performed By: resident       Location: OB       Pre-Anesthestic Checklist: patient identified, IV checked, risks and benefits discussed, informed consent, monitors and equipment checked, pre-op evaluation, at physician/surgeon's request and post-op pain management  Timeout:       Correct Patient: Yes        Correct Procedure: Yes        Correct Site: Yes        Correct Position: Yes   Procedure Documentation  Procedure: intrathecal injection       Patient Position: sitting       Patient Prep/Sterile Barriers: sterile gloves, mask, patient draped       Skin prep: Chloraprep       Insertion Site: L3-4. (midline approach).       Needle Gauge: 25.        Needle Length (Inches): 3.5        Spinal Needle Type: Pencan       Introducer used       Introducer: 20 G       # of attempts: 2 and  # of redirects:  1    Assessment/Narrative         Paresthesias: No.       Sensory Level: T7       CSF fluid: clear.      FOR [facility] ([facility]) ONLY:   Pain Team Contact information: please page the Pain Team Via [employer]. Search \"Pain\". During daytime hours, please page the attending first. At night please page the resident first.      "

## 2024-11-27 NOTE — PROGRESS NOTES
Brief Magnesium Check/ Labor Progress Note    S: Feeling fine, no contractions. Denies headaches, vision changes, n/v, SOB/CP, RUQ pain, worsening edema.     O: /84   Pulse 81   Temp 98.2  F (36.8  C) (Oral)   Resp 16   Wt 86.1 kg (189 lb 12.8 oz)   LMP 2024 (Exact Date)   SpO2 99%   BMI 35.86 kg/m      General: NAD  CV: regular rate,well perfused   Resp:  breathing comfortably on room air     UOP: Adequate    SVE: Deferred, 3/50/-2 at last check    FHT: baseline 130, moderate variability with periods of minimal, no accelerations, no decelerations   [place]: 3-4 ctx in 10 minutes    A/P: 44 year old  at 33w5d being induced for pre-eclampsia with severe features. No acute concerns.    # IOL  - s/p ripening with misoprostol and magdaleno balloon  - Continue augmentation with pitocin per protocol     # Pre-eclampsia with severe features  - Mag started at 1436 (1126)  - Paused this AM 2/2 weakness which has since improved; mg level returned at 7.0. magnesium re-started at 1g/h. Exam reassuring against magnesium toxicity at this time   - repeat magnesium level  at 1800 was 5.8. Will continue infusion at 1g/hr.  - Next clinical Magnesium check at 2330    Dianne Omalley MD  Ob/Gyn Resident, PGY-3  2024 7:31 PM

## 2024-11-27 NOTE — PROGRESS NOTES
"S: patient uncomfortable with contractions.    O: Vital signs:  Temp: 97.8  F (36.6  C) Temp src: Oral BP: 127/71 Pulse: 81   Resp: 15 SpO2: 99 % O2 Device: None (Room air)     Weight: 86.1 kg (189 lb 12.8 oz)  Estimated body mass index is 35.86 kg/m  as calculated from the following:    Height as of 24: 1.549 m (5' 1\").    Weight as of this encounter: 86.1 kg (189 lb 12.8 oz).    SVE: 3/80/-2   FHT: 135, minimal variability, accels absent, recurrent late and variable decels    [place]: 2 in 10    A/P: Patient a 44 to  admitted for IOL for pre-eclampsia with severe features. S/p ripening with miso and magdaleno, s/p AROM, continues in latent labor. Discussed category II fetal heart rate tracing with patient. Reviewed minimal variability, and recurrent decels. Given remote from delivery, pre-term status, recommend expediting delivery, which at this point would be a  section. Patient s/p formal written consent.  Additional questions answered. Plan to proceed with urgent c/s within 30 minutes, ancef and azithromycin for antibiotics. Anesthesia aware.     Shantell Rivas MD  Obstetrics and Gynecology, PGY-2  2024 8:30 AM      "

## 2024-11-28 VITALS
SYSTOLIC BLOOD PRESSURE: 117 MMHG | HEART RATE: 89 BPM | RESPIRATION RATE: 18 BRPM | OXYGEN SATURATION: 99 % | WEIGHT: 191.1 LBS | DIASTOLIC BLOOD PRESSURE: 74 MMHG | BODY MASS INDEX: 36.11 KG/M2 | TEMPERATURE: 98.2 F

## 2024-11-28 LAB
ALBUMIN SERPL BCG-MCNC: 2.7 G/DL (ref 3.5–5.2)
ALP SERPL-CCNC: 128 U/L (ref 40–150)
ALT SERPL W P-5'-P-CCNC: 41 U/L (ref 0–50)
ANION GAP SERPL CALCULATED.3IONS-SCNC: 12 MMOL/L (ref 7–15)
AST SERPL W P-5'-P-CCNC: 29 U/L (ref 0–45)
BILIRUB SERPL-MCNC: <0.2 MG/DL
BUN SERPL-MCNC: 12.9 MG/DL (ref 6–20)
CALCIUM SERPL-MCNC: 7.6 MG/DL (ref 8.8–10.4)
CHLORIDE SERPL-SCNC: 102 MMOL/L (ref 98–107)
CREAT SERPL-MCNC: 0.88 MG/DL (ref 0.51–0.95)
EGFRCR SERPLBLD CKD-EPI 2021: 84 ML/MIN/1.73M2
ERYTHROCYTE [DISTWIDTH] IN BLOOD BY AUTOMATED COUNT: 14.7 % (ref 10–15)
GLUCOSE BLDC GLUCOMTR-MCNC: 98 MG/DL (ref 70–99)
GLUCOSE SERPL-MCNC: 118 MG/DL (ref 70–99)
HCO3 SERPL-SCNC: 22 MMOL/L (ref 22–29)
HCT VFR BLD AUTO: 27.2 % (ref 35–47)
HGB BLD-MCNC: 9.1 G/DL (ref 11.7–15.7)
MCH RBC QN AUTO: 29.4 PG (ref 26.5–33)
MCHC RBC AUTO-ENTMCNC: 33.5 G/DL (ref 31.5–36.5)
MCV RBC AUTO: 88 FL (ref 78–100)
PLATELET # BLD AUTO: 169 10E3/UL (ref 150–450)
POTASSIUM SERPL-SCNC: 4.4 MMOL/L (ref 3.4–5.3)
PROT SERPL-MCNC: 5.5 G/DL (ref 6.4–8.3)
RBC # BLD AUTO: 3.09 10E6/UL (ref 3.8–5.2)
SODIUM SERPL-SCNC: 136 MMOL/L (ref 135–145)
WBC # BLD AUTO: 11.3 10E3/UL (ref 4–11)

## 2024-11-28 PROCEDURE — 250N000013 HC RX MED GY IP 250 OP 250 PS 637

## 2024-11-28 PROCEDURE — 99232 SBSQ HOSP IP/OBS MODERATE 35: CPT | Mod: GC | Performed by: OBSTETRICS & GYNECOLOGY

## 2024-11-28 PROCEDURE — 36415 COLL VENOUS BLD VENIPUNCTURE: CPT

## 2024-11-28 PROCEDURE — 120N000002 HC R&B MED SURG/OB UMMC

## 2024-11-28 PROCEDURE — 250N000011 HC RX IP 250 OP 636

## 2024-11-28 PROCEDURE — 85027 COMPLETE CBC AUTOMATED: CPT

## 2024-11-28 PROCEDURE — 82947 ASSAY GLUCOSE BLOOD QUANT: CPT

## 2024-11-28 PROCEDURE — 82435 ASSAY OF BLOOD CHLORIDE: CPT

## 2024-11-28 RX ORDER — LEVOTHYROXINE SODIUM 112 UG/1
56 TABLET ORAL WEEKLY
Status: DISCONTINUED | OUTPATIENT
Start: 2024-12-03 | End: 2024-12-01 | Stop reason: HOSPADM

## 2024-11-28 RX ORDER — NIFEDIPINE 30 MG/1
30 TABLET, EXTENDED RELEASE ORAL EVERY 24 HOURS
Status: DISCONTINUED | OUTPATIENT
Start: 2024-11-28 | End: 2024-11-29

## 2024-11-28 RX ADMIN — ACETAMINOPHEN 975 MG: 325 TABLET, FILM COATED ORAL at 05:35

## 2024-11-28 RX ADMIN — IBUPROFEN 800 MG: 800 TABLET, FILM COATED ORAL at 12:27

## 2024-11-28 RX ADMIN — ACETAMINOPHEN 975 MG: 325 TABLET, FILM COATED ORAL at 12:27

## 2024-11-28 RX ADMIN — NIFEDIPINE 30 MG: 30 TABLET, FILM COATED, EXTENDED RELEASE ORAL at 18:48

## 2024-11-28 RX ADMIN — ENOXAPARIN SODIUM 40 MG: 40 INJECTION SUBCUTANEOUS at 09:02

## 2024-11-28 RX ADMIN — SENNOSIDES AND DOCUSATE SODIUM 2 TABLET: 50; 8.6 TABLET ORAL at 09:02

## 2024-11-28 RX ADMIN — LABETALOL HYDROCHLORIDE 400 MG: 200 TABLET, FILM COATED ORAL at 14:50

## 2024-11-28 RX ADMIN — LEVOTHYROXINE SODIUM 112 MCG: 112 TABLET ORAL at 04:53

## 2024-11-28 RX ADMIN — LABETALOL HYDROCHLORIDE 400 MG: 200 TABLET, FILM COATED ORAL at 09:02

## 2024-11-28 RX ADMIN — IBUPROFEN 800 MG: 800 TABLET, FILM COATED ORAL at 18:48

## 2024-11-28 RX ADMIN — KETOROLAC TROMETHAMINE 30 MG: 30 INJECTION, SOLUTION INTRAMUSCULAR at 05:34

## 2024-11-28 RX ADMIN — SENNOSIDES AND DOCUSATE SODIUM 2 TABLET: 50; 8.6 TABLET ORAL at 20:40

## 2024-11-28 RX ADMIN — ACETAMINOPHEN 975 MG: 325 TABLET, FILM COATED ORAL at 18:48

## 2024-11-28 RX ADMIN — LABETALOL HYDROCHLORIDE 400 MG: 200 TABLET, FILM COATED ORAL at 20:40

## 2024-11-28 RX ADMIN — NIFEDIPINE 30 MG: 30 TABLET, FILM COATED, EXTENDED RELEASE ORAL at 05:34

## 2024-11-28 ASSESSMENT — ACTIVITIES OF DAILY LIVING (ADL)
ADLS_ACUITY_SCORE: 38
ADLS_ACUITY_SCORE: 37
ADLS_ACUITY_SCORE: 37
ADLS_ACUITY_SCORE: 38
ADLS_ACUITY_SCORE: 38
ADLS_ACUITY_SCORE: 37
ADLS_ACUITY_SCORE: 38
ADLS_ACUITY_SCORE: 37
ADLS_ACUITY_SCORE: 38
ADLS_ACUITY_SCORE: 37
ADLS_ACUITY_SCORE: 37
ADLS_ACUITY_SCORE: 38
ADLS_ACUITY_SCORE: 37
ADLS_ACUITY_SCORE: 37
ADLS_ACUITY_SCORE: 38
ADLS_ACUITY_SCORE: 38
ADLS_ACUITY_SCORE: 37
ADLS_ACUITY_SCORE: 38
ADLS_ACUITY_SCORE: 37

## 2024-11-28 NOTE — PROVIDER NOTIFICATION
11/27/24 1809   Provider Notification   Provider Name/Title Dr. Rivas (G3)   Method of Notification Electronic Page   Request Evaluate-Remote   Notification Reason Vital Signs Change     Notified provider that patient has Procardia 60mg ordered for now although her last two BPs were a bit softer: 96/78 and 102/70. Per discussion with provider, provider will decrease evening Procardia from 60mg to 30mg.

## 2024-11-28 NOTE — PROGRESS NOTES
Lab Results   Component Value Date    HGBA1C 6 4 12/06/2021     Reviewed labs provided by patient, very well controlled and will continue current medications per endocrinology  Advised on need for good foot and eye care  [facility]   Postpartum Progress Note / Magnesium Check Note    Name:  Ravi Peñaloza  MRN: [id number]    S:  Ravi reports she is doing well this morning. Continues to tolerate magnesium without issue. No chest pain, shortness of breath, headaches, visual changes, RUQ pain, or other systemic symptoms. Gillespie remains in place. Passing flatus. Tolerating regular diet without nausea or vomiting. Pumping for infant in the NICU and reports that baby girl is doing well.     O:   Patient Vitals for the past 24 hrs:   BP Temp Temp src Pulse Resp SpO2 Weight   11/28/24 0735 (!) 122/91 -- -- -- 16 100 % --   11/28/24 0635 108/73 -- -- -- 16 99 % 86.7 kg (191 lb 1.6 oz)   11/28/24 0540 110/73 -- -- -- -- 98 % --   11/28/24 0440 104/79 98  F (36.7  C) Oral -- 16 100 % --   11/28/24 0240 114/78 -- -- -- 16 96 % --   11/28/24 0130 117/80 -- -- 74 16 98 % --   11/28/24 0030 105/71 -- -- 77 16 95 % --   11/27/24 2330 116/71 -- -- 69 16 98 % --   11/27/24 2239 102/71 -- -- 74 16 97 % --   11/27/24 2200 110/66 -- -- -- -- 100 % --   11/27/24 2130 116/87 -- -- 67 16 98 % --   11/27/24 1955 112/79 97.8  F (36.6  C) Oral 67 16 99 % --   11/27/24 1900 91/70 98.1  F (36.7  C) Oral 65 14 98 % --   11/27/24 1800 102/70 97.8  F (36.6  C) Oral 67 14 97 % --   11/27/24 1700 96/78 98.1  F (36.7  C) Oral 69 16 98 % --   11/27/24 1600 104/84 98.2  F (36.8  C) Oral 64 16 98 % --   11/27/24 1515 (!) 135/95 -- -- -- -- 100 % --   11/27/24 1500 (!) 136/118 98  F (36.7  C) Oral -- 16 100 % --   11/27/24 1400 (!) 143/94 -- -- 64 16 100 % --   11/27/24 1330 138/88 97.5  F (36.4  C) Oral 61 14 99 % --   11/27/24 1300 (!) 138/101 -- -- 65 14 99 % --   11/27/24 1238 (!) 119/100 98  F (36.7  C) Oral 67 16 99 % --   11/27/24 1200 (!) 125/90 -- -- 59 11 99 % --   11/27/24 1145 124/84 98  F (36.7  C) Tympanic 61 21 99 % --   11/27/24 1130 97/71 -- -- 65 17 99 % --   11/27/24 1115 123/78 -- -- 65 15 98 % --    24 1100 117/84 -- -- 65 16 97 % --   24 1045 111/86 97.9  F (36.6  C) Oral 65 18 98 % --   24 1040 115/82 -- -- 70 26 97 % --     Gen:  Resting comfortably, NAD  CV:  Regular rate and rhythm.  No murmurs.   Pulm:  Clear to auscultation bilaterally. No wheezes or crackles.   Abd:  Soft, appropriately tender to palpation, non-distended. Fundus at 2 cm below the umbilicus, firm and non-tender.   Incision:  Bandage in place without saturation; no surrounding erythema or drainage  Ext:  Non-tender, trace edema to bilateral lower extremities    I/O last 3 completed shifts:  In: 4567.5 [P.O.:1686; I.V.:2881.5]  Out: 4244 [Urine:3650; Blood:594]      Assessment/Plan:  Ravi Peñaloza 44 year old  who is POD #1 s/p PLTCS in the setting of non-reassuring fetal status remote from delivery. She is currently on magnesium sulfate for seizure prophylaxis in the setting of preeclampsia with severe features. She is doing well in the early postpartum period and working towards meeting goals for discharge home    # Postpartum management  Pain: Well-controlled with ibuprofen, tylenol, and oxycodone PRN   GI:  PRN bowel regimen, anti-emetics  : Gillespie remains in place; will plan for removal this AM after discontinuation of magnesium  Hgb: Hgb 13>  > AM Hemoglobin pending. Asymptomatic from acute blood loss anemia secondary to delivery; will discharge with oral iron if Hgb <10.   Rh: Positive  Rubella: Immune  Feed: Pumping for infant in the NICU  Infant:  Doing well in the NICU per patient report  BC: Declines; discussed recommended interval pregnancy spacing of 18 months  PPx:  Encourage ambulation, IS, SCDs while confined to bed    # Pre-eclampsia with severe features (BP)  - Blood pressures overnight largely normotensive; isolated mild range pressure this AM  - Magnesium currently running at 1g/hour               -Mag level last 5.9                -d/c mag on  at 0920  - S/p 4d nifed 30/60 >  D#2 30/30, D#4 labetalol 400 TID  -HELLP labs: AST 51; ALT 58>69, HELLP o/w wnl (11/27); HELLP labs for this AM pending  -IV hypertensives prn for sustained severe range BPs   -UOP adequate as above.   -Daily weights + strict I/Os    Medically Ready for Discharge: Anticipated in 2-4 Days    Dasia Rivas MD  Obstetrics and Gynecology, PGY-2  11/28/24 8:31 AM    Staff MD Note    I appreciate the note by Dr. Rivas.  Any necessary changes have been made by me.  I saw and evaluated the patient and agree with the findings and plan of care as documented in the note.  On my evaluation patient now off Magnesium, feeling well, took a shower.  Denies any symptoms of preeclampsia.  Is voiding spontaneously.  Making appropriate postoperative progress.    Kala Solis MD

## 2024-11-28 NOTE — PLAN OF CARE
Goal Outcome Evaluation:      Plan of Care Reviewed With: patient    Overall Patient Progress: improvingOverall Patient Progress: improving       .Data: Vital signs, postpartum assessments WNL. Pt I has magdaleno placed until Magnesium Sulfate is turned off-output adequate, magdaleno care done. Pt up ambulating as tolerated, eating and drinking without nausea. Fasting glucose check-98. Lochia and fundal checks WNL, no s/s of infection. Pt has no clonus DTR wnl +2, and denies all s/s of pre-e. Medicated per MAR. Pumping for infant in NICU. Reports good pain management.  Action: Education provided on discharge goals, plan of care, and pain management.  Response: Pt is agreeable with plan of care. Support person,  Stiven, present. Plan of care ongoing, no concerns as of present.

## 2024-11-28 NOTE — PLAN OF CARE
Problem: Postpartum ( Delivery)  Goal: Successful Parent Role Transition  Outcome: Progressing  Intervention: Support Parent Role Transition  Recent Flowsheet Documentation  Taken 2024 by Nasrin Acosta RN  Supportive Measures:   active listening utilized   goal-setting facilitated   positive reinforcement provided  Goal: Hemostasis  Outcome: Progressing  Goal: Fluid and Electrolyte Balance  Outcome: Progressing  Intervention: Monitor and Manage Fluid and Electrolyte Balance  Recent Flowsheet Documentation  Taken 2024 by Nasrin Acosta RN  Fluid/Electrolyte Management: fluids provided  Goal: Absence of Infection Signs and Symptoms  Outcome: Progressing  Intervention: Prevent or Manage Infection  Recent Flowsheet Documentation  Taken 2024 by Nasrin Acosta RN  Infection Management: aseptic technique maintained  Goal: Optimal Pain Control and Function  Outcome: Progressing  Goal: Effective Oxygenation and Ventilation  Outcome: Progressing  Intervention: Optimize Oxygenation and Ventilation  Recent Flowsheet Documentation  Taken 2024 by Nasrin Acosta RN  Head of Bed (HOB) Positioning: HOB lowered     Assessment complete and WDL. VSS. Pt denies headache, RUQ pain and vision changes. Reflexes WDL, no clonus present. I/O charted per orders. Mag infusion stopped at 0900 per orders. Pt denies pain, taking scheduled pain medication. Pt visited infant in NICU multiple times this shift. Pt using electric breast pump. Continue POC.

## 2024-11-28 NOTE — LACTATION NOTE
This note was copied from a baby's chart.  Met with mom briefly on NICU.  I let her know I'd like to meet with her; she declined at the moment, wanted to meet later today at 1800.    Dodie Alberts, RNC-ALVIN, IBCLC  Lactation Consultant  [place]: [facility] [phone]  Office: [phone]

## 2024-11-28 NOTE — PROGRESS NOTES
Magnesium Check    S:Ravi reports she is doing well. No chest pain, shortness of breath, headaches, visual changes, RUQ pain, or other systemic symptoms. Tolerating magnesium without issue.     O:  /79 (BP Location: Right arm, Patient Position: Semi-Acevedo's, Cuff Size: Adult Regular)   Pulse 67   Temp 97.8  F (36.6  C) (Oral)   Resp 16   Wt 86.1 kg (189 lb 12.8 oz)   LMP 2024 (Exact Date)   SpO2 99%   Breastfeeding Unknown   BMI 35.86 kg/m      General: AAOx3, NAD  CV: RRR, no murmurs, rubs, or gallops  Resp: CTAB, no wheezes, rales, or rhonchi  Ext: Reflexes 2+ bilateral patellar, no clonus, 1+ bilateral brachioradialis; 1+ edema on bilateral LE    UOP: 0.82ml/kg/hour over past 6 hours      A/P: 44 year old  who is POD#0 following PLTCS for non-reassuring fetal status. She is currently on magnesium for seizure prophylaxis in the setting of pre-eclampsia with severe features. Currently doing well with no signs of toxicity. BPs currently normotensive.    # Pre-eclampsia with severe features  - Magnesium currently running at 1g/hour   -Mag level: 5.9    -d/c mag on  at 0920  -HELLP labs: AST 51; ALT 58>69, HELLP o/w wnl ()  -IV hypertensives prn for sustained severe range BPs   -UOP adequate as above.   -Continue q4hr Mag checks  - Daily weights + strict I/Os    Dasia Rivas MD  Obstetrics and Gynecology, PGY-2  24 9:04 PM

## 2024-11-28 NOTE — PROVIDER NOTIFICATION
11/27/24 2000   Provider Notification   Provider Name/Title Dianne Kamaraette (G3)   Method of Notification Electronic Page   Request Evaluate-Remote   Notification Reason Other     Pts BP's have been soft x2 systolic in 90s. This BP is 112/79 with HR 67 currently. Are you okay with giving scheduled 400mg Labetalol that's due now?    G3: Yes please. We'll reduce if its persistently soft.

## 2024-11-28 NOTE — PROGRESS NOTES
Post Partum Progress Note and Magnesium Check    Subjective:  She is resting comfortably in bed this morning without complaints or concerns. Pain is improving and well controlled on current medication regimen. She is tolerating PO intake. Lochia present and appropriate.  She has a magdaleno in place. She has passed flatus. She is ambulating without dizziness or difficulty.  She denies headache, changes in vision, nausea/vomiting, chest pain, shortness of breath, RUQ pain, or worsening edema. Tolerating magnesium without issue. Pumping feeding.    Objective:  Vitals:    24 1800 24 1900 24 1955 24 2130   BP: 102/70 91/70 112/79 116/87   BP Location: Right arm Right arm Right arm    Patient Position: Semi-Acevedo's Semi-Acevedo's Semi-Acevedo's    Cuff Size: Adult Regular Adult Regular Adult Regular    Pulse: 67 65 67    Resp: 14 14 16    Temp: 97.8  F (36.6  C) 98.1  F (36.7  C) 97.8  F (36.6  C)    TempSrc: Oral Oral Oral    SpO2: 97% 98% 99% 98%   Weight:         General: NAD, resting comfortably  CV: RRR, no murmurs, rubs, or gallops; warm, well perfused  Pulm: CTAB; normal respiratory effort  Abd: soft, non-tender, non-distended  Incision: clean, dry, intact with minimal soiling  Ext: Reflexes 2+ bilateral patellar, no clonus, 1+ bilateral brachioradialis; trace edema on bilateral LE trace lower extremity edema bilaterally. No calf tenderness.    Assessment/Plan:  Ravi Peñaloza is a 44 year old  female who is POD#1 s/p PLTCS for non-reassuring fetal status. Pregnancy notable for preeclampsia with severe features, severe fetal growth restriction, category 2 remote from delivery, GDMA2 and an 11cm fibroid. Not yet meeting all postpartum goals; still working on safe disposition plan. She is currently on magnesium for seizure prophylaxis in the setting of pre-eclampsia with severe features. Currently doing well with no signs of toxicity. BPs currently normotensive.     # Pre-eclampsia with  severe features  - Magnesium currently running at 1g/hour               -Mag level: 5.9                -d/c mag on 11/28 at 0920  -HELLP labs: AST 51; ALT 58>69, HELLP o/w wnl (11/27)  -IV hypertensives prn for sustained severe range BPs   -UOP adequate as above.   -Continue q4hr Mag checks  -Daily weights + strict I/Os    # Postpartum care  - PNC: Rh pos. Rubella immune. No intervention indicated.  - Pain: well controlled on current regimen  - Heme: Hgb 13.0> EBL 594ml > AM Hgb pending.  If <10 will discharge home with iron.  - GI: continue anti-emetics and stool softeners as needed.  - : s/p magdaleno. Spontaneously voiding.  - Feeding: Pumping  - BC: follow-up at postpartum clinic visit  - PPx: encourage ambulation, IS, SCDs while in bed for prolonged periods.     Dispo: Anticipate discharge to home on POD#2-3 pending clinical course.    Mayra Ro, MS3  [facility] [facility] [employer]

## 2024-11-29 PROBLEM — O14.10 PREECLAMPSIA, SEVERE: Status: ACTIVE | Noted: 2024-11-29

## 2024-11-29 PROCEDURE — 120N000002 HC R&B MED SURG/OB UMMC

## 2024-11-29 PROCEDURE — 99232 SBSQ HOSP IP/OBS MODERATE 35: CPT | Mod: GC | Performed by: OBSTETRICS & GYNECOLOGY

## 2024-11-29 PROCEDURE — 250N000013 HC RX MED GY IP 250 OP 250 PS 637

## 2024-11-29 PROCEDURE — 250N000011 HC RX IP 250 OP 636

## 2024-11-29 RX ORDER — FERROUS SULFATE 325(65) MG
325 TABLET ORAL
Qty: 45 TABLET | Refills: 0 | Status: SHIPPED | OUTPATIENT
Start: 2024-11-29

## 2024-11-29 RX ORDER — IBUPROFEN 600 MG/1
600 TABLET, FILM COATED ORAL EVERY 6 HOURS PRN
Qty: 60 TABLET | Refills: 0 | Status: SHIPPED | OUTPATIENT
Start: 2024-11-29

## 2024-11-29 RX ORDER — NIFEDIPINE 30 MG/1
30 TABLET, EXTENDED RELEASE ORAL DAILY
Status: DISCONTINUED | OUTPATIENT
Start: 2024-11-30 | End: 2024-11-30

## 2024-11-29 RX ORDER — AMOXICILLIN 250 MG
1 CAPSULE ORAL DAILY
Qty: 100 TABLET | Refills: 0 | Status: SHIPPED | OUTPATIENT
Start: 2024-11-29

## 2024-11-29 RX ORDER — NIFEDIPINE 30 MG
30 TABLET, EXTENDED RELEASE ORAL 2 TIMES DAILY
Qty: 90 TABLET | Refills: 0 | Status: SHIPPED | OUTPATIENT
Start: 2024-11-29 | End: 2024-11-30

## 2024-11-29 RX ORDER — LABETALOL 200 MG/1
400 TABLET, FILM COATED ORAL 3 TIMES DAILY
Qty: 300 TABLET | Refills: 0 | Status: SHIPPED | OUTPATIENT
Start: 2024-11-29 | End: 2024-12-01

## 2024-11-29 RX ORDER — NIFEDIPINE 30 MG/1
60 TABLET, EXTENDED RELEASE ORAL DAILY
Status: DISCONTINUED | OUTPATIENT
Start: 2024-11-30 | End: 2024-12-01

## 2024-11-29 RX ORDER — NIFEDIPINE 30 MG/1
30 TABLET, EXTENDED RELEASE ORAL ONCE
Status: COMPLETED | OUTPATIENT
Start: 2024-11-29 | End: 2024-11-29

## 2024-11-29 RX ORDER — ACETAMINOPHEN 325 MG/1
650 TABLET ORAL EVERY 6 HOURS PRN
Qty: 100 TABLET | Refills: 0 | Status: SHIPPED | OUTPATIENT
Start: 2024-11-29

## 2024-11-29 RX ADMIN — LABETALOL HYDROCHLORIDE 400 MG: 200 TABLET, FILM COATED ORAL at 15:41

## 2024-11-29 RX ADMIN — ACETAMINOPHEN 975 MG: 325 TABLET, FILM COATED ORAL at 06:09

## 2024-11-29 RX ADMIN — NIFEDIPINE 30 MG: 30 TABLET, FILM COATED, EXTENDED RELEASE ORAL at 23:56

## 2024-11-29 RX ADMIN — IBUPROFEN 800 MG: 800 TABLET, FILM COATED ORAL at 00:35

## 2024-11-29 RX ADMIN — LEVOTHYROXINE SODIUM 112 MCG: 112 TABLET ORAL at 04:55

## 2024-11-29 RX ADMIN — LABETALOL HYDROCHLORIDE 400 MG: 200 TABLET, FILM COATED ORAL at 22:11

## 2024-11-29 RX ADMIN — SENNOSIDES AND DOCUSATE SODIUM 2 TABLET: 50; 8.6 TABLET ORAL at 08:43

## 2024-11-29 RX ADMIN — IBUPROFEN 800 MG: 800 TABLET, FILM COATED ORAL at 06:09

## 2024-11-29 RX ADMIN — ACETAMINOPHEN 975 MG: 325 TABLET, FILM COATED ORAL at 12:47

## 2024-11-29 RX ADMIN — NIFEDIPINE 30 MG: 30 TABLET, FILM COATED, EXTENDED RELEASE ORAL at 06:02

## 2024-11-29 RX ADMIN — ENOXAPARIN SODIUM 40 MG: 40 INJECTION SUBCUTANEOUS at 08:44

## 2024-11-29 RX ADMIN — ACETAMINOPHEN 975 MG: 325 TABLET, FILM COATED ORAL at 00:35

## 2024-11-29 RX ADMIN — LABETALOL HYDROCHLORIDE 400 MG: 200 TABLET, FILM COATED ORAL at 08:43

## 2024-11-29 RX ADMIN — IBUPROFEN 800 MG: 800 TABLET, FILM COATED ORAL at 19:00

## 2024-11-29 RX ADMIN — NIFEDIPINE 30 MG: 30 TABLET, FILM COATED, EXTENDED RELEASE ORAL at 19:00

## 2024-11-29 RX ADMIN — IBUPROFEN 800 MG: 800 TABLET, FILM COATED ORAL at 12:47

## 2024-11-29 RX ADMIN — ACETAMINOPHEN 975 MG: 325 TABLET, FILM COATED ORAL at 18:59

## 2024-11-29 ASSESSMENT — ACTIVITIES OF DAILY LIVING (ADL)
ADLS_ACUITY_SCORE: 30

## 2024-11-29 NOTE — CONSULTS
"*Copied from baby's chart:    Social Work Initial Consult    DATA/ASSESSMENT    General Information  Assessment completed with: Parents, Olman  Type of visit: Initial Assessment      Reason for Consult: NICU admission    Living Environment:   Olman split their time between [place] and [place] due to their work/school commitments. Ravi plans to stay in [place] for her maternity leave but will eventually return to [place] with baby to finish up her doctorate degree at the [employer].    Family Factors  Family Risk Factors: First time parents, limited support system, limited financial resources  Family Strength Factors: engaged, loving, committed parents     Have a safe sleep option: No, SW will provide them with a pack 'n play donated from [employer] car seat: No, parents will purchase     Primary Care: [facility]     Assessment of Support  Parental Marital Status:   Who is your support system?: Family and friends live in [place], report that it is \"just the two of them\"   Description of Support System: Supportive, Involved  Support Assessment: Patient communicates needs well met, Limited social contact and support    Employment/Financial/Insurance  Patient's caregiver works full/part time: Yes, Stiven works as a researcher at the [employer]. Ravi is a doctoral student at the [employer].     They report that their finances are limited as only Stiven has a current income and Ravi is a student. SW provided them with a monthly hospital parking pass.     Insurance: Family had many questions regarding insurance. Parents are each under their own individual plans through their employers. They are unsure which plan would be more cost effective to add baby to. SW discussed calling their insurance companies to find out each plan's deductibles and max out of pocket expenses they'd be responsible for. Parents were also " "curious if their limited income might meet the requirements for medical assistance. SW provided them with brochure on [place] Health Care Programs which includes current income guidelines and how to apply for medical assistance. SW also provided them with contact information for [facility] (where father's current residence is) and [facility] at their request.         Mental Health/Coping  Olman appear to be coping well so far. They acknowledge that there has been uncertainty that is difficult for them, but overall they are feeling okay knowing that their baby is doing well. SW facilitated conversation on post partum mood disorders. Parents report feeling well educated on symptoms to watch for and report knowing where they would reach out to for additional support if needed. Both parents denied any current mental health concerns.          INTERVENTION  Conducted chart review and consulted with medical team regarding plan of care.   Introduced SW role and scope of practice.   Orientation to the unit (parking, lodging, meals, visitation)  Provided assessment of patient and family's level of coping  Conducted psychosocial assessment   Validated emotions and provided supportive listening  Facilitated service linkage with hospital and community resources  Described process for obtaining birth certificate, social security card and insurance  Provided SW contact info    PLAN  SW will continue to follow for supportive intervention.     MISSY Rodriguez, [facility]  Maternal Child Health   M-F 08:00-16:30 on [employer]   Cell Phone: [phone]  [email or web address]    After hours On-Call SW can be reached via [employer] @ \"Peds SW After Hours On Call 1620 to 08\"  Weekend On-Site SW can be reached via [employer] @ \"Peds SW Weekend Onsite 08 to 1630\"  "

## 2024-11-29 NOTE — PROGRESS NOTES
[facility]   Postpartum Progress Note / Magnesium Check Note    Name:  Ravi Peñaloza  MRN: [id number]    S:  Ravi in the NICU this morning. Per RN, patient is doing well this morning. Reports her bleeding has been within normal limits overnight. Passing flatus and voiding spontaneously. Reports she has had no signs/symptoms of preeclampsia on nurses review of systems. Tolerating regular diet without nausea or vomiting. Per RN, patient undecided if she would like to discharge today vs. tomorrow.     O:   Patient Vitals for the past 24 hrs:   BP Temp Temp src Pulse Resp SpO2 Weight   11/29/24 0454 132/86 -- -- -- -- -- 84.7 kg (186 lb 12.8 oz)   11/29/24 0050 117/75 98  F (36.7  C) Oral 80 16 -- --   11/28/24 2040 117/74 -- -- 89 -- -- --   11/28/24 1726 114/87 98.2  F (36.8  C) Oral 74 18 -- --   11/28/24 1249 119/87 -- -- 84 16 -- --   11/28/24 0914 -- -- -- -- -- 99 % --   11/28/24 0913 -- -- -- -- -- 100 % --   11/28/24 0912 -- -- -- -- -- 98 % --   11/28/24 0911 -- -- -- -- -- 99 % --   11/28/24 0910 -- -- -- -- -- 97 % --   11/28/24 0909 -- -- -- -- -- 97 % --   11/28/24 0908 -- -- -- -- -- 97 % --   11/28/24 0907 -- -- -- -- -- 98 % --   11/28/24 0906 -- -- -- -- -- 98 % --   11/28/24 0905 -- -- -- -- -- 99 % --   11/28/24 0904 -- -- -- -- -- 97 % --   11/28/24 0903 -- -- -- -- -- 100 % --   11/28/24 0902 -- -- -- -- -- 99 % --   11/28/24 0901 -- -- -- -- -- 97 % --   11/28/24 0900 -- -- -- -- -- 99 % --   11/28/24 0859 -- -- -- -- -- 96 % --   11/28/24 0858 -- -- -- -- -- 96 % --   11/28/24 0857 -- -- -- -- -- 96 % --   11/28/24 0856 -- -- -- -- -- 97 % --   11/28/24 0855 -- -- -- -- -- 99 % --   11/28/24 0854 -- -- -- -- -- 99 % --   11/28/24 0853 -- -- -- -- -- 96 % --   11/28/24 0852 -- -- -- -- -- 100 % --   11/28/24 0851 -- -- -- -- -- 97 % --   11/28/24 0850 -- -- -- -- -- 99 % --   11/28/24 0849 -- -- -- -- -- 99 % --   11/28/24 0848 -- -- -- -- -- 99  % --   11/28/24 0847 -- -- -- -- -- 98 % --   11/28/24 0846 -- -- -- -- -- 100 % --   11/28/24 0845 -- -- -- -- -- 99 % --   11/28/24 0844 -- -- -- -- -- 90 % --   11/28/24 0843 -- -- -- -- -- 98 % --   11/28/24 0842 -- -- -- -- -- 98 % --   11/28/24 0841 -- -- -- -- -- 99 % --   11/28/24 0840 -- -- -- -- -- 98 % --   11/28/24 0839 -- -- -- -- -- 98 % --   11/28/24 0838 -- -- -- -- -- 98 % --   11/28/24 0837 -- -- -- -- -- 99 % --   11/28/24 0836 -- -- -- -- -- 97 % --   11/28/24 0835 -- -- -- -- -- 97 % --   11/28/24 0834 114/77 -- -- -- 16 98 % --   11/28/24 0833 -- -- -- -- -- 98 % --   11/28/24 0832 -- -- -- -- -- 98 % --   11/28/24 0831 -- -- -- -- -- 98 % --   11/28/24 0830 -- -- -- -- -- 98 % --   11/28/24 0829 -- -- -- -- -- 100 % --   11/28/24 0828 -- -- -- -- -- 99 % --   11/28/24 0827 -- -- -- -- -- 99 % --   11/28/24 0826 -- -- -- -- -- 99 % --   11/28/24 0825 -- -- -- -- -- 95 % --   11/28/24 0824 -- -- -- -- -- 100 % --   11/28/24 0823 -- -- -- -- -- 98 % --   11/28/24 0822 -- -- -- -- -- 99 % --   11/28/24 0821 -- -- -- -- -- 98 % --   11/28/24 0820 -- -- -- -- -- 98 % --   11/28/24 0819 -- -- -- -- -- 98 % --   11/28/24 0818 -- -- -- -- -- 98 % --   11/28/24 0817 -- -- -- -- -- 97 % --   11/28/24 0816 -- -- -- -- -- 98 % --   11/28/24 0815 -- -- -- -- -- 98 % --   11/28/24 0814 -- -- -- -- -- 98 % --   11/28/24 0813 -- -- -- -- -- 99 % --   11/28/24 0812 -- -- -- -- -- 98 % --   11/28/24 0811 -- -- -- -- -- 98 % --   11/28/24 0810 -- -- -- -- -- 99 % --   11/28/24 0809 -- -- -- -- -- 100 % --   11/28/24 0808 -- -- -- -- -- 98 % --   11/28/24 0807 -- -- -- -- -- 99 % --   11/28/24 0806 -- -- -- -- -- 99 % --   11/28/24 0805 -- -- -- -- -- 99 % --   11/28/24 0804 -- -- -- -- -- 100 % --   11/28/24 0803 -- -- -- -- -- 99 % --   11/28/24 0802 -- -- -- -- -- 99 % --   11/28/24 0801 -- -- -- -- -- 99 % --   11/28/24 0800 -- -- -- -- -- 100 % --   11/28/24 0735 (!) 122/91 -- -- -- 16 100 % --     Physical  examination unable to be complete as patient in NICU    I/O last 3 completed shifts:  In: 0 [P.O.:2150]  Out: 3600 [Urine:3600]    Assessment/Plan:  Ravi Peñaloza 44 year old  who is POD #2  s/p PLTCS in the setting of non-reassuring fetal status remote from delivery. She is doing well in the postpartum period and working towards meeting goals for discharge home.    # Postpartum management  Pain: Well-controlled with ibuprofen, tylenol, and oxycodone PRN per RN report  GI:  PRN bowel regimen, anti-emetics  : Voiding spontaneously  Hgb: Hgb 13>  > 9.1. Asymptomatic from acute blood loss anemia secondary to delivery per RN's report; will discharge with oral iron given Hgb <10.   Rh: Positive  Rubella: Immune  Feed: Pumping for infant in the NICU  Infant:  Doing well in the NICU per patient's prior report  BC: Declines; discussed recommended interval pregnancy spacing of 18 months  PPx:  Encourage ambulation, IS, SCDs while confined to bed    # Pre-eclampsia with severe features (BP)  - Blood pressures overnight normotensive  - S/p 24 hours of magnesium sulfate infusion  - S/p 4d nifed 30/60 > D#3 30/30, D#5 labetalol 400 TID  -HELLP labs:- AST 51> 29; ALT 58>69>41, HELLP o/w wnl (); HELLP labs for this AM pending  -IV hypertensives prn for sustained severe range BPs   -UOP adequate as above.   -Daily weights + strict I/Os    Medically Ready for Discharge: Anticipated Today or Tomorrow    Dasia Rivas MD  Obstetrics and Gynecology, PGY-2  24 7:02 AM

## 2024-11-29 NOTE — LACTATION NOTE
This note was copied from a baby's chart.  Lactation Admission Note      Baby Information:  Infant's first name:  Tim  Infant medical history: 33+6     needed? No    Lactation goal (if known): Breastfeed for 2 years  Lactation history: 1st baby.    Mother's Information: Name: Ravi Peñaloza  Occupation:    Age: 44  Delivery type:     [place]: Recently moved form [place]  Pump for home use:      Partner's name: Stiven Rubi  Occupation:      Relevant maternal medical and social hx:       Information for the patient's mother:  Ravi Peñaloza [[id number]]     Past Medical History:   Diagnosis Date    Fibroid uterus     Gestational diabetes     Hypothyroidism     Migraine without aura and without status migrainosus, not intractable    ,   Information for the patient's mother:  Ravi Peñaloza [[id number]]     Patient Active Problem List   Diagnosis    Supervision of high risk pregnancy in third trimester    Insulin controlled gestational diabetes mellitus (GDM) in third trimester    Acquired hypothyroidism    Fetal growth restriction antepartum    Uterine fibroids affecting pregnancy in third trimester    AMA (advanced maternal age) primigravida 35+, third trimester    Severe preeclampsia   , and   Information for the patient's mother:  Ravi Peñaloza [[id number]]     Medications Prior to Admission   Medication Sig Dispense Refill Last Dose/Taking    acetone urine (KETOSTIX) test strip Check ketones once daily in urine 100 strip 2 2024    aspirin (ASA) 81 MG chewable tablet Take 81 mg by mouth daily.   2024    insulin aspart (NOVOLOG PEN) 100 UNIT/ML pen Inject 1-8 Units subcutaneously 3 times daily (before meals). 15 mL 0 2024    insulin aspart (NOVOLOG PEN) 100 UNIT/ML pen Inject 1-5 Units subcutaneously at bedtime. 15 mL 0 2024    insulin NPH (NOVOLIN N FLEXPEN) 100 UNIT/ML injection Inject 8 Units subcutaneously at bedtime. 15 mL 3 2024    insulin pen  needle (31G X 5 MM) 31G X 5 MM miscellaneous Use 1 pen needles daily or as directed. 30 each 1 11/21/2024    levothyroxine (SYNTHROID/LEVOTHROID) 112 MCG tablet Take 1 tablet (112 mcg) by mouth daily. Plus additional 1/2 tab weekly 60 tablet 0 11/22/2024    levothyroxine (SYNTHROID/LEVOTHROID) 88 MCG tablet Take 1 tablet (88 mcg) by mouth daily. 90 tablet 3 11/22/2024    Prenatal MV-Min-Fe Fum-FA-DHA (PRENATAL MULTIVITAMIN PLUS DHA PO) Take 1 tablet by mouth daily.   11/21/2024         Relevant maternal medications:   Synthroid  Labetalol  Nifedipine    Maternal risk factors:  Hypertension (details) pre-e  Diabetes (type) GDMA, insulin controlled  Thyroid disease (type) hypothyroid, takes synthroid  Age 44  Surgical birth     Admission Education given:  [x]Admission packet  [x]Kangaroo care  [x]Benefits of breast milk  [x]How breast milk is made  [x]Stages of milk production  [x]Milk supply/ goal volumes  [x]Hand expression  [x]Hands-on pumping  [x]Collecting, labeling, transporting milk  [x]Cleaning, sanitizing pump parts  [x]Storage of milk  [x]Benefits and use of pasteurized donor human milk        Dodie Alberts, RNC-ALVIN, IBCLC   Lactation Consultant  Lance: [facility] [phone]  Office: [phone]

## 2024-11-29 NOTE — PROGRESS NOTES
"  Anesthesia Post-Partum Follow-Up Note After  Delivery with Spinal    Patient: Ravi Peñaloza    Patient location: Post-partum floor    Anesthesia type: Spinal Block    Subjective  Ravi Peñaloza does not complain of pruritis at this time. She denies weakness, denies paresthesia, denies difficulties breathing or voiding, denies nausea or vomiting, and denies headache. She is able to ambulate and tolerates regular diet.     Objective  Respiratory Function (RR / SpO2 / Airway Patency): Satisfactory  Cardiac Function (HR / Rhythm / BP): Satisfactory  Strength and sensation lower extremities: Normal  Spinal  site: No signs of infection or inflammation    Most recent vitals  /86 (BP Location: Left arm, Patient Position: Supine, Cuff Size: Adult Regular)   Pulse 80   Temp 36.7  C (98  F) (Oral)   Resp 16   Wt 84.7 kg (186 lb 12.8 oz)   LMP 2024 (Exact Date)   SpO2 99%   Breastfeeding Unknown   BMI 35.30 kg/m      Assessment and plan  Ravi Peñaloza is a 44 year old female  post-partum #2 s/p  delivery with  spinal and TAP block for post-operative pain control    At this time, there is no evidence of adverse side effects associated with the anesthesia procedures. If the patient develops new lower extremity paresis or paresthesias, please reach out to the OB anesthesia team.    Thank you for including us in the care for this patient.    Luis Carlos \"Babak\" BRAYDEN Caro-1    "

## 2024-11-29 NOTE — PROVIDER NOTIFICATION
11/29/24 0102   Provider Notification   Provider Name/Title Dr. Rivas (G2)   Method of Notification Electronic Page   Request Evaluate-Remote   Notification Reason Other     FYI that IV access lost.

## 2024-11-29 NOTE — PLAN OF CARE
Problem: Adult Inpatient Plan of Care  Goal: Readiness for Transition of Care  Outcome: Progressing   Goal Outcome Evaluation:      Patient VSS moderate range. Patient denies Pre E.Signs and symptoms. Patient voids spontaneously. Patient supported by spouse at bedside. Patient up independently to see  in NICU.     Fundus firm. Scant bleeding. Patient denies pain. Acknowledges she will call out if needing pain medications. Patient is pumping independently.     Anticipate discharge tomorrow.

## 2024-11-29 NOTE — PLAN OF CARE
Goal Outcome Evaluation:             VSS and postpartum assessment WDL. No symptoms of pre-eclampsia. Pain adequately managed with tylenol and ibuprofen. Ambulating independently, tolerating regular diet, and voiding without difficulty. Pumping for infant in NICU. Visited NICU multiple times this shift. Plan of care reviewed with patient. Continue with plan of care.       Problem: Adult Inpatient Plan of Care  Goal: Optimal Comfort and Wellbeing  Outcome: Progressing  Intervention: Provide Person-Centered Care  Recent Flowsheet Documentation  Taken 11/29/2024 0055 by Aubrie Nathan, RN  Trust Relationship/Rapport:   care explained   choices provided   questions encouraged

## 2024-11-30 DIAGNOSIS — E03.9 HYPOTHYROIDISM, UNSPECIFIED TYPE: Primary | ICD-10-CM

## 2024-11-30 LAB
GLUCOSE BLDC GLUCOMTR-MCNC: 97 MG/DL (ref 70–99)
PLATELET # BLD AUTO: 192 10E3/UL (ref 150–450)

## 2024-11-30 PROCEDURE — 99232 SBSQ HOSP IP/OBS MODERATE 35: CPT | Mod: GC | Performed by: OBSTETRICS & GYNECOLOGY

## 2024-11-30 PROCEDURE — 250N000013 HC RX MED GY IP 250 OP 250 PS 637

## 2024-11-30 PROCEDURE — 36415 COLL VENOUS BLD VENIPUNCTURE: CPT

## 2024-11-30 PROCEDURE — 120N000002 HC R&B MED SURG/OB UMMC

## 2024-11-30 PROCEDURE — 250N000011 HC RX IP 250 OP 636

## 2024-11-30 PROCEDURE — 85049 AUTOMATED PLATELET COUNT: CPT

## 2024-11-30 RX ORDER — HYDROCHLOROTHIAZIDE 25 MG/1
25 TABLET ORAL DAILY
Status: DISCONTINUED | OUTPATIENT
Start: 2024-11-30 | End: 2024-12-01 | Stop reason: HOSPADM

## 2024-11-30 RX ORDER — NIFEDIPINE 30 MG/1
30 TABLET, EXTENDED RELEASE ORAL ONCE
Status: COMPLETED | OUTPATIENT
Start: 2024-11-30 | End: 2024-11-30

## 2024-11-30 RX ORDER — NIFEDIPINE 30 MG
30 TABLET, EXTENDED RELEASE ORAL EVERY MORNING
Qty: 90 TABLET | Refills: 0 | Status: SHIPPED | OUTPATIENT
Start: 2024-11-30 | End: 2024-12-01

## 2024-11-30 RX ORDER — LABETALOL 200 MG/1
400 TABLET, FILM COATED ORAL 3 TIMES DAILY
Status: DISCONTINUED | OUTPATIENT
Start: 2024-12-01 | End: 2024-12-01

## 2024-11-30 RX ORDER — LABETALOL 200 MG/1
200 TABLET, FILM COATED ORAL ONCE
Status: COMPLETED | OUTPATIENT
Start: 2024-12-01 | End: 2024-12-01

## 2024-11-30 RX ORDER — NIFEDIPINE 30 MG/1
60 TABLET, EXTENDED RELEASE ORAL DAILY
Status: DISCONTINUED | OUTPATIENT
Start: 2024-12-01 | End: 2024-12-01

## 2024-11-30 RX ORDER — LEVOTHYROXINE SODIUM 88 UG/1
88 TABLET ORAL DAILY
Qty: 60 TABLET | Refills: 0 | Status: SHIPPED | OUTPATIENT
Start: 2024-11-30 | End: 2024-12-01

## 2024-11-30 RX ADMIN — LEVOTHYROXINE SODIUM 112 MCG: 112 TABLET ORAL at 05:38

## 2024-11-30 RX ADMIN — ACETAMINOPHEN 975 MG: 325 TABLET, FILM COATED ORAL at 01:07

## 2024-11-30 RX ADMIN — ENOXAPARIN SODIUM 40 MG: 40 INJECTION SUBCUTANEOUS at 08:49

## 2024-11-30 RX ADMIN — ACETAMINOPHEN 975 MG: 325 TABLET, FILM COATED ORAL at 14:13

## 2024-11-30 RX ADMIN — SENNOSIDES AND DOCUSATE SODIUM 2 TABLET: 50; 8.6 TABLET ORAL at 08:48

## 2024-11-30 RX ADMIN — NIFEDIPINE 60 MG: 30 TABLET, FILM COATED, EXTENDED RELEASE ORAL at 21:27

## 2024-11-30 RX ADMIN — SENNOSIDES AND DOCUSATE SODIUM 2 TABLET: 50; 8.6 TABLET ORAL at 21:26

## 2024-11-30 RX ADMIN — IBUPROFEN 800 MG: 800 TABLET, FILM COATED ORAL at 21:26

## 2024-11-30 RX ADMIN — HYDROCHLOROTHIAZIDE 25 MG: 25 TABLET ORAL at 15:09

## 2024-11-30 RX ADMIN — IBUPROFEN 800 MG: 800 TABLET, FILM COATED ORAL at 01:07

## 2024-11-30 RX ADMIN — NIFEDIPINE 30 MG: 30 TABLET, FILM COATED, EXTENDED RELEASE ORAL at 08:49

## 2024-11-30 RX ADMIN — LABETALOL HYDROCHLORIDE 400 MG: 200 TABLET, FILM COATED ORAL at 08:49

## 2024-11-30 RX ADMIN — NIFEDIPINE 30 MG: 30 TABLET, FILM COATED, EXTENDED RELEASE ORAL at 15:09

## 2024-11-30 RX ADMIN — IBUPROFEN 800 MG: 800 TABLET, FILM COATED ORAL at 14:13

## 2024-11-30 RX ADMIN — LABETALOL HYDROCHLORIDE 400 MG: 200 TABLET, FILM COATED ORAL at 21:27

## 2024-11-30 RX ADMIN — LABETALOL HYDROCHLORIDE 400 MG: 200 TABLET, FILM COATED ORAL at 14:13

## 2024-11-30 RX ADMIN — IBUPROFEN 800 MG: 800 TABLET, FILM COATED ORAL at 08:48

## 2024-11-30 RX ADMIN — ACETAMINOPHEN 975 MG: 325 TABLET, FILM COATED ORAL at 21:26

## 2024-11-30 RX ADMIN — ACETAMINOPHEN 975 MG: 325 TABLET, FILM COATED ORAL at 08:48

## 2024-11-30 ASSESSMENT — ACTIVITIES OF DAILY LIVING (ADL)
ADLS_ACUITY_SCORE: 30

## 2024-11-30 NOTE — PROVIDER NOTIFICATION
Dr. Solis said to cancel discharge order and med will be titrated and to recheck in 4 hours.   11/30/24 1412   Provider Notification   Provider Name/Title Dr. Harkins   Method of Notification Phone   Request Evaluate-Remote   Notification Reason Vital Signs Change  (/101. do you still want me to discharge patient ?)

## 2024-11-30 NOTE — LACTATION NOTE
This note was copied from a baby's chart.  Lactation Follow Up Note    Reason for visit/ call/ message:  Comfort check  Dispense breast pump.    Supply:  Pumping every 3 hours for up to 75ml on maintain setting.    Significant changes (medications, equipment, comfort, etc):  Baby is on 11th floor, Ravi is discharging from [facility], might room in with baby.    Hand Hugs/ Skin to Skin/ Oral Care/ Nuzzling/ Latching:  Encouraged.    Education given:  Dispensed Spectra pump and discussed how to use it.    Plan:  Ongoing lactation support. Assist with latching when baby is cueing.    Tiki Salinas, RNC-ALVIN, IBCLC   Lactation Consultant  Lance: [facility] [phone]  Office: [phone]

## 2024-11-30 NOTE — DISCHARGE INSTRUCTIONS
Warning Signs after Having a Baby    Keep this paper on your fridge or somewhere else where you can see it.    Call your provider if you have any of these symptoms up to 12 weeks after having your baby.    Thoughts of hurting yourself or your baby  Pain in your chest or trouble breathing  Severe headache not helped by pain medicine  Eyesight concerns (blurry vision, seeing spots or flashes of light, other changes to eyesight)  Fainting, shaking or other signs of a seizure    Call 9-1-1 if you feel that it is an emergency.     The symptoms below can happen to anyone after giving birth. They can be very serious. Call your provider if you have any of these warning signs.    My provider s phone number: _______________________    Losing too much blood (hemorrhage)    Call your provider if you soak through a pad in less than an hour or pass blood clots bigger than a golf ball. These may be signs that you are bleeding too much.    Blood clots in the legs or lungs    After you give birth, your body naturally clots its blood to help prevent blood loss. Sometimes this increased clotting can happen in other areas of the body, like the legs or lungs. This can block your blood flow and be very dangerous.     Call your provider if you:  Have a red, swollen spot on the back of your leg that is warm or painful when you touch it.   Are coughing up blood.     Infection    Call your provider if you have any of these symptoms:  Fever of 100.4 F (38 C) or higher.  Pain or redness around your stitches if you had an incision.   Any yellow, white, or green fluid coming from places where you had stitches or surgery.    Mood Problems (postpartum depression)    Many people feel sad or have mood changes after having a baby. But for some people, these mood swings are worse.     Call your provider right away if you feel so anxious or nervous that you can't care for yourself or your baby.    Preeclampsia (high blood pressure)    Even if you  didn't have high blood pressure when you were pregnant, you are at risk for the high blood pressure disease called preeclampsia. This risk can last up to 12 weeks after giving birth.     Call your provider if you have:   Pain on your right side under your rib cage  Sudden swelling in the hands and face    Remember: You know your body. If something doesn't feel right, get medical help.     For informational purposes only. Not to replace the advice of your health care provider. Copyright 2020 [facility]. All rights reserved. Clinically reviewed by Fior Jennings, RNC-OB, MSN. Balls.ie [id number] - Rev .     Section: What to Expect at Home  Your Recovery     A  section, or , is surgery to deliver your baby through a cut that the doctor makes in your lower belly and uterus. The cut is called an incision.  You may have some pain in your lower belly and need pain medicine for 1 to 2 weeks. You can expect some vaginal bleeding for several weeks. You will probably need about 6 weeks to fully recover.  It's important to take it easy while the incision heals. Avoid heavy lifting, strenuous activities, and exercises that strain the belly muscles while you recover. Ask a family member or friend for help with housework, cooking, and shopping.  This care sheet gives you a general idea about how long it will take for you to recover. But each person recovers at a different pace. Follow the steps below to get better as quickly as possible.  How can you care for yourself at home?  Activity    Rest when you feel tired. Getting enough sleep will help you recover.     Try to walk each day. Start by walking a little more than you did the day before. Bit by bit, increase the amount you walk. Walking boosts blood flow and helps prevent pneumonia, constipation, and blood clots.     Avoid strenuous activities, such as bicycle riding, jogging, weightlifting, and aerobic exercise, for 6 weeks or until  your doctor says it is okay.     Until your doctor says it is okay, do not lift anything heavier than your baby.     Do not do sit-ups or other exercises that strain the belly muscles for 6 weeks or until your doctor says it is okay.     Hold a pillow over your incision when you cough or take deep breaths. This will support your belly and decrease your pain.     You may shower as usual. Pat the incision dry when you are done.     You will have some vaginal bleeding. Wear sanitary pads. Do not douche or use tampons until your doctor says it is okay.     Ask your doctor when you can drive again.     You will probably need to take at least 6 weeks off work. It depends on the type of work you do and how you feel.     Ask your doctor when it is okay for you to have sex.   Diet    You can eat your normal diet. If your stomach is upset, try bland, low-fat foods like plain rice, broiled chicken, toast, and yogurt.     Drink plenty of fluids (unless your doctor tells you not to).     You may notice that your bowel movements are not regular right after your surgery. This is common. Try to avoid constipation and straining with bowel movements. You may want to take a fiber supplement every day. If you have not had a bowel movement after a couple of days, ask your doctor about taking a mild laxative.     If you are breastfeeding, limit alcohol. Alcohol can cause a lack of energy and other health problems for the baby when a breastfeeding woman drinks heavily. It can also get in the way of a mom's ability to feed her baby or to care for the child in other ways. There isn't a lot of research about exactly how much alcohol can harm a baby. Having no alcohol is the safest choice for your baby. If you choose to have a drink now and then, have only one drink, and limit the number of occasions that you have a drink. Wait to breastfeed at least 2 hours after you have a drink to reduce the amount of alcohol the baby may get in the milk.    Medicines    Your doctor will tell you if and when you can restart your medicines. You will also get instructions about taking any new medicines.     If you stopped taking aspirin or some other blood thinner, your doctor will tell you when to start taking it again.     Take pain medicines exactly as directed.  If the doctor gave you a prescription medicine for pain, take it as prescribed.  If you are not taking a prescription pain medicine, ask your doctor if you can take an over-the-counter medicine.     If you think your pain medicine is making you sick to your stomach:  Take your medicine after meals (unless your doctor has told you not to).  Ask your doctor for a different pain medicine.     If your doctor prescribed antibiotics, take them as directed. Do not stop taking them just because you feel better. You need to take the full course of antibiotics.   Incision care    If you have strips of tape on the incision, leave the tape on for a week or until it falls off.     Wash the area daily with warm, soapy water, and pat it dry. Don't use hydrogen peroxide or alcohol, which can slow healing. You may cover the area with a gauze bandage if it weeps or rubs against clothing. Change the bandage every day.     Keep the area clean and dry.   Other instructions    If you breastfeed your baby, you may be more comfortable while you are healing if you don't rest your baby on your belly. Try tucking your baby under your arm, with your baby's body along the side you will be feeding on. Support your baby's upper body with your arm. With that hand you can control your baby's head to bring your baby's mouth to your breast. This is sometimes called the football hold.   Follow-up care is a key part of your treatment and safety. Be sure to make and go to all appointments, and call your doctor if you are having problems. It's also a good idea to know your test results and keep a list of the medicines you take.  When should you  call for help?  Share this information with your partner, family, or a friend. They can help you watch for warning signs.  Call 911  anytime you think you may need emergency care. For example, call if:    You feel you cannot stop from hurting yourself, your baby, or someone else.     You passed out (lost consciousness).     You have chest pain, are short of breath, or cough up blood.     You have a seizure.   Where to get help 24 hours a day, 7 days a week   If you or someone you know talks about suicide, self-harm, a mental health crisis, a substance use crisis, or any other kind of emotional distress, get help right away. You can:    Call the Suicide and Crisis Lifeline at 988.     Call 6-715-839-TALK (1-261.246.8968).     Text HOME to 803632 to access the Crisis Text Line.   Consider saving these numbers in your phone.  Go to [employer] for more information or to chat online.  Call your doctor or midwife now or seek immediate medical care if:    You have loose stitches, or your incision comes open.     You have signs of hemorrhage (too much bleeding), such as:  Heavy vaginal bleeding. This means that you are soaking through one or more pads in an hour. Or you pass blood clots bigger than an egg.  Feeling dizzy or lightheaded, or you feel like you may faint.  Feeling so tired or weak that you cannot do your usual activities.  A fast or irregular heartbeat.  New or worse belly pain.     You have symptoms of infection, such as:  Increased pain, swelling, warmth, or redness.  Red streaks leading from the incision.  Pus draining from the incision.  A fever.  Frequent or painful urination or blood in your urine.  Vaginal discharge that smells bad.  New or worse belly pain.     You have symptoms of a blood clot in your leg (called a deep vein thrombosis), such as:  Pain in the calf, back of the knee, thigh, or groin.  Swelling in the leg or groin.  A color change on the leg or groin. The skin may be reddish or  "purplish, depending on your usual skin color.     You have signs of preeclampsia, such as:  Sudden swelling of your face, hands, or feet.  New vision problems (such as dimness, blurring, or seeing spots).  A severe headache.     You have signs of heart failure, such as:  New or increased shortness of breath.  New or worse swelling in your legs, ankles, or feet.  Sudden weight gain, such as more than 2 to 3 pounds in a day or 5 pounds in a week.  Feeling so tired or weak that you cannot do your usual activities.     You had spinal or epidural pain relief and have:  New or worse back pain.  Increased pain, swelling, warmth, or redness at the injection site.  Tingling, weakness, or numbness in your legs or groin.   Watch closely for changes in your health, and be sure to contact your doctor or midwife if:    Your vaginal bleeding isn't decreasing.     You feel sad, anxious, or hopeless for more than a few days.     You are having problems with your breasts or breastfeeding.   Where can you learn more?  Go to https://www.TheFix.com.net/patiented  Enter M806 in the search box to learn more about \" Section: What to Expect at Home.\"  Current as of: July 10, 2023  Content Version: 2024 [facility] [employer].   Care instructions adapted under license by your healthcare professional. If you have questions about a medical condition or this instruction, always ask your healthcare professional. [employer] disclaims any warranty or liability for your use of this information.  Checking Your Blood Pressure at Home  During and after pregnancy  How do I measure my blood pressure?  It s important to take the readings at the same time each day, such as morning and evening. Take your blood pressure before taking any morning medications.  How to get the most accurate reading  30 minutes before checking your blood pressure, avoid the following:  Drinking caffeine  Drinking " alcohol  Eating  Smoking  Exercising  5 minutes before checking your blood pressure:  Use the bathroom and urinate so you have an empty bladder.  Sit still in a chair for around 5 minutes. Stay calm and relaxed and do not talk if possible.  To check your blood pressure:     Sit up straight in a chair.  Place your feet on the floor. Don t cross your ankles or legs.  Rest your arm at the level of your heart on a table or desk or on the arm of a chair. Use the same arm every day.  Pull up your shirt sleeve. Don t take the measurement over clothes.  Wrap the blood pressure cuff around the upper part of your left arm, 1 inch (2.5 cm) above your elbow.  Fit the cuff snugly around your arm. You should be able to place only one finger between the cuff and your arm.  Position the cord so that it rests in the bend of your elbow.  Press the power button.  Sit quietly while the cuff inflates and deflates.  Read the digital reading on the monitor screen and write the numbers down (record them) in a notebook.  Wait 2-3 minutes, then repeat the steps, starting at step 1.  Which features do you need?  Arm cuff monitors give the most exact readings.  Wrist and finger blood pressure monitors are often less exact.  Pick a blood pressure monitor that has passed tests to show they measure exactly. Blood pressure cuffs for sale in the [place] that have passed tests are listed on the website www.validatebp.org.  Some monitors that have passed tests are:  Omron 3 Series Upper Arm Blood Pressure Monitor (Model [id number])  Omron 5 Series Upper Arm Blood Pressure Monitor (Model LW4497)  Omron 7 Series Upper Arm Blood Pressure Monitor (Model HEM-7320)  [employer] Upper Arm Blood Pressure Monitor with Talking Function (UA 1030T)  Don t use smartphone apps. There are many smartphone apps that claim to check your blood pressure using the pulse in your wrist or finger. These don t work. They haven t passed any tests. Don t give your clinic a blood  "pressure reading from a smartphone andreina.  If you have a flexible spending account (FSA) or health savings account (HSA), you may wish to pay yourself back (reimburse) for the machine and cuff. A blood pressure monitor is an allowed over-the- counter (OTC) item to pay yourself back from these accounts.  Cuff size  The size of the arm cuff is a key feature. Make sure the cuff is the right size for your arm. If the cuff isn t the right size, readings will either be too high or low.  To know what size cuff to buy, measure the distance around your bicep (upper arm).  Use a flexible measuring tape or . Place the measuring tape longterm between your armpit and elbow. Measure the distance around your arm in inches.  You may need to buy a cuff apart from the machine to get the right size.  Cuff sizes and arm measurements  Small adult: 22 to 26 cm (8.7 to 10.2 inches)  Adult: 27 to 34 cm (10.6 to 13.4 inches)  Large adult: 35 to 44 cm (13.8 to 17.3 inches)  Adult thigh: 45 to 52 cm (17.7 to 20.5 inches)    Copyright statement\" content=\"For informational purposes only. Not to replace the advice of your health care provider. Photo: ID [id number]   [place]. Text copyright   2023 [facility]. All rights reserved. Clinically reviewed by [facility]. [employer]it [id number] - REV 03/23.      "

## 2024-11-30 NOTE — PROGRESS NOTES
[facility]   Postpartum Progress Note / Magnesium Check Note    Name:  Ravi Peñaloza  MRN: [id number]    S:  Ravi reports she is doing well this morning. States her pain is well controlled on current medication regimen. Ambulating without lightheadedness or dizziness. Voiding spontaneously and passing flatus. Lochia minimal and appropriate. Tolerating regular diet without nausea or vomiting. No chest pain, shortness of breath, fevers, chills, headache, visual changes, RUQ pain, or other systemic symptoms. Continues to pump for infant in the NICU. Comfortable with discharge today.     O:   Patient Vitals for the past 24 hrs:   BP Temp Temp src Pulse Resp Weight   24 0605 -- -- -- -- -- 82.9 kg (182 lb 12.8 oz)   24 0345 130/87 -- -- 82 16 --   24 2350 (!) 140/95 98.5  F (36.9  C) Oral 90 16 --   24 2211 (!) 136/95 -- -- 97 -- --   24 2024 126/89 98.6  F (37  C) Oral 100 -- --   24 1541 (!) 132/96 98.4  F (36.9  C) Oral 91 -- --   24 1247 (!) 137/95 -- -- -- -- --   24 0843 137/86 98.7  F (37.1  C) Oral 84 20 --     Gen:      Resting comfortably, NAD  CV:       Regular rate and rhythm.  No murmurs.   Pulm:    Clear to auscultation bilaterally. No wheezes or crackles.   Abd:      Soft, appropriately tender to palpation, non-distended. Fundus at 2 cm below the umbilicus, firm and non-tender.   Incision:  Clean/dry/intact without surrounding erythema or drainage; Steri-strips in place without saturation  Ext:       Non-tender, trace edema to bilateral lower extremities    I/O last 3 completed shifts:  In: 150 [P.O.:150]  Out: 1100 [Urine:1100]    Assessment/Plan:  Ravi Peñaloza 44 year old  who is POD #3 s/p PLTCS in the setting of non-reassuring fetal status remote from delivery. She is doing well in the postpartum period and meeting all goals for discharge home.     # Postpartum  management  Pain: Well-controlled with ibuprofen, tylenol, and oxycodone PRN per RN report  GI:  PRN bowel regimen, anti-emetics  : Voiding spontaneously  Hgb: Hgb 13>  > 9.1. Asymptomatic from acute blood loss anemia related to procedural blood loss; will discharge with oral iron given Hgb <10.   Rh: Positive  Rubella: Immune  Feed: Pumping for infant in the NICU  Infant:  Doing well in the NICU per patient's report  BC: Declines; discussed recommended interval pregnancy spacing of 18 months  PPx:  Encourage ambulation, IS, SCDs while confined to bed    # Pre-eclampsia with severe features (BP)  - A few mild range blood pressures overnight, thus nifedipine uptitrated to 30/60; blood pressure this morning normotensive. Will continue serial BP monitoring today.  - S/p 24 hours of magnesium sulfate infusion  - S/p 4d nifed 30/30, D#1 nifed 30/60, D#6 labetalol 400 TID  -HELLP labs:- AST 51> 29; ALT 58>69>41, HELLP o/w wnl (11/28)  -IV hypertensives prn for sustained severe range BPs   -UOP adequate as above.   -Daily weights + strict I/Os    Medically Ready for Discharge: Anticipated Today (pending blood pressure control)    Dasia Rivas MD  Obstetrics and Gynecology, PGY-2  11/30/24 8:37 AM    Staff MD Note    I appreciate the note by Dr. Rivas.  Any necessary changes have been made by me.  I saw and evaluated the patient and agree with the findings and plan of care as documented in the note.  Patient doing well without symptoms of preeclampsia.  Had slightly elevated diastolic and medication adjustments made.  Meeting postoperative goals.  Plan to discharge with HOME=BP followup.  Discussed calling with BP > 160/110 or any signs of preeclampsia.  Discussed postoperative discharge instructions.  Facilitated 2 and 6 weeks PP visit at [facility] prior to discharge.    Kala Solis MD

## 2024-11-30 NOTE — PLAN OF CARE
"  Problem: Adult Inpatient Plan of Care  Goal: Patient-Specific Goal (Individualized)  Description: You can add care plan individualizations to a care plan. Examples of Individualization might be:  \"Parent requests to be called daily at 9am for status\", \"I have a hard time hearing out of my right ear\", or \"Do not touch me to wake me up as it startles  me\".  Outcome: Progressing   Goal Outcome Evaluation:      Plan of Care Reviewed With: patient    Overall Patient Progress: improvingOverall Patient Progress: improving     Data: Mild range BP and MD updated and postpartum checks WNL. Patient eating and drinking normally. Patient able to empty bladder independently and up ambulating. Patient performing self care..Fundus at  1 cm below U and firm  without massage.  lochia scant and  no blood clots. Incision with steri straps, bruising noted. No sign of infection. Reflexes+1 and no clonus.   Action: Patient taking Tylenol and ibuprofen .for pain and pain tolerable. Encouraged patient to pump every 2 - 3 hours . Patient education done( education record). Monitoring intake and output.   Response: Patient visiting infant in NICU. Support/ spouse present at bedside and attentive to  patient.   Plan: Continue with the plan of cares.          "

## 2024-11-30 NOTE — PLAN OF CARE
Goal Outcome Evaluation:      Plan of Care Reviewed With: patient    Overall Patient Progress: improving    Vital signs WDL with the exception of BP of 140/95. Postpartum checks: WDL. Incision: WDL, no apparent signs of infection. Pt eating and drinking without issue. Pt ambulating and voiding independently. Pt performing self-cares. Pt medicated for pain during the shift. Support person present. Infant in NICU.

## 2024-12-01 VITALS
HEART RATE: 89 BPM | TEMPERATURE: 97.9 F | SYSTOLIC BLOOD PRESSURE: 124 MMHG | BODY MASS INDEX: 34.24 KG/M2 | DIASTOLIC BLOOD PRESSURE: 83 MMHG | OXYGEN SATURATION: 99 % | RESPIRATION RATE: 16 BRPM | WEIGHT: 181.2 LBS

## 2024-12-01 PROCEDURE — 250N000011 HC RX IP 250 OP 636

## 2024-12-01 PROCEDURE — 99238 HOSP IP/OBS DSCHRG MGMT 30/<: CPT | Mod: GC | Performed by: OBSTETRICS & GYNECOLOGY

## 2024-12-01 PROCEDURE — 250N000013 HC RX MED GY IP 250 OP 250 PS 637

## 2024-12-01 PROCEDURE — 250N000013 HC RX MED GY IP 250 OP 250 PS 637: Performed by: OBSTETRICS & GYNECOLOGY

## 2024-12-01 RX ORDER — HYDROCHLOROTHIAZIDE 25 MG/1
25 TABLET ORAL DAILY
Qty: 5 TABLET | Refills: 0 | Status: SHIPPED | OUTPATIENT
Start: 2024-12-01

## 2024-12-01 RX ORDER — LABETALOL 300 MG/1
600 TABLET, FILM COATED ORAL 3 TIMES DAILY
Qty: 180 TABLET | Refills: 0 | Status: SHIPPED | OUTPATIENT
Start: 2024-12-01 | End: 2024-12-31

## 2024-12-01 RX ORDER — NIFEDIPINE 30 MG/1
60 TABLET, EXTENDED RELEASE ORAL 2 TIMES DAILY
Status: DISCONTINUED | OUTPATIENT
Start: 2024-12-01 | End: 2024-12-01 | Stop reason: HOSPADM

## 2024-12-01 RX ORDER — LABETALOL 300 MG/1
600 TABLET, FILM COATED ORAL 3 TIMES DAILY
Status: DISCONTINUED | OUTPATIENT
Start: 2024-12-01 | End: 2024-12-01 | Stop reason: HOSPADM

## 2024-12-01 RX ADMIN — LEVOTHYROXINE SODIUM 112 MCG: 112 TABLET ORAL at 05:06

## 2024-12-01 RX ADMIN — NIFEDIPINE 60 MG: 30 TABLET, FILM COATED, EXTENDED RELEASE ORAL at 08:35

## 2024-12-01 RX ADMIN — LABETALOL HYDROCHLORIDE 200 MG: 200 TABLET, FILM COATED ORAL at 01:06

## 2024-12-01 RX ADMIN — IBUPROFEN 800 MG: 800 TABLET, FILM COATED ORAL at 05:03

## 2024-12-01 RX ADMIN — SENNOSIDES AND DOCUSATE SODIUM 2 TABLET: 50; 8.6 TABLET ORAL at 08:35

## 2024-12-01 RX ADMIN — ENOXAPARIN SODIUM 40 MG: 40 INJECTION SUBCUTANEOUS at 08:36

## 2024-12-01 RX ADMIN — HYDROCHLOROTHIAZIDE 25 MG: 25 TABLET ORAL at 08:36

## 2024-12-01 RX ADMIN — ACETAMINOPHEN 975 MG: 325 TABLET, FILM COATED ORAL at 05:03

## 2024-12-01 ASSESSMENT — ACTIVITIES OF DAILY LIVING (ADL)
ADLS_ACUITY_SCORE: 30

## 2024-12-01 NOTE — PROGRESS NOTES
Data: VSS and postpartum checks WNL. Patient eating and drinking normally. Patient able to empty bladder independently and up ambulating. Patient performing self. Fundus at 1 cm below U and firm  without massage.  lochia scant and  no blood clots. Steri straps intact on incision, and no sign of infection.   Action: Patient taking Tylenol and ibuprofen for pain and pain tolerable. Encouraged patient pump every 2 - 3 hours. Labetalol  and procardia given and  Patient education done( education record).   Response: Patient pumping and visiting infant. Support/ spouse present at bedside and attentive to patient.   Plan: Continue with the plan of cares.

## 2024-12-01 NOTE — PROGRESS NOTES
Discharge instructions read and explained to patient, all questions answered. Medications double checked and  given to patient.BP cuff givent. Instructed on how to take BP at home and welcome statement done on MY chart.  Patient discharged to home with all belongings.

## 2024-12-01 NOTE — PROVIDER NOTIFICATION
11/30/24 1819   Provider Notification   Provider Name/Title Dr. Diana Galindo   Method of Notification Phone   Request Evaluate-Remote   Notification Reason Vital Signs Change;Status Update  (complained of feeling dizzy, lightheaded, some numbness on left hand. , BG 97, /89 and 135/96. symptoms resolved afert 15 minutes with resting in bed.)

## 2024-12-01 NOTE — PLAN OF CARE
"Goal Outcome Evaluation:      Plan of Care Reviewed With: patient    Overall Patient Progress: improvingOverall Patient Progress: improving    VSS and postpartum assessments WDL.  Up ad gabo with steady gait and independent with cares. Visits infant in NICU frequently. Pumping every 2-4hrs.  Pain managed with Tylenol and Ibuprofen.  Spouse, Stiven present and supportive.  Will continue with postpartum cares and education per plan of care.       Problem: Adult Inpatient Plan of Care  Goal: Plan of Care Review  Description: The Plan of Care Review/Shift note should be completed every shift.  The Outcome Evaluation is a brief statement about your assessment that the patient is improving, declining, or no change.  This information will be displayed automatically on your shift  note.  Outcome: Progressing  Flowsheets (Taken 12/1/2024 0511)  Plan of Care Reviewed With: patient  Overall Patient Progress: improving  Goal: Patient-Specific Goal (Individualized)  Description: You can add care plan individualizations to a care plan. Examples of Individualization might be:  \"Parent requests to be called daily at 9am for status\", \"I have a hard time hearing out of my right ear\", or \"Do not touch me to wake me up as it startles  me\".  Outcome: Progressing  Goal: Absence of Hospital-Acquired Illness or Injury  Outcome: Progressing  Intervention: Prevent Skin Injury  Recent Flowsheet Documentation  Taken 11/30/2024 2127 by Alysha Arteaga, RN  Body Position: position changed independently  Intervention: Prevent and Manage VTE (Venous Thromboembolism) Risk  Recent Flowsheet Documentation  Taken 11/30/2024 2127 by Alysha Arteaga, RN  VTE Prevention/Management: (pt ambulating) --  Intervention: Prevent Infection  Recent Flowsheet Documentation  Taken 11/30/2024 2127 by Alysha Arteaga, RN  Infection Prevention:   equipment surfaces disinfected   hand hygiene promoted   personal protective equipment utilized   rest/sleep " promoted  Goal: Optimal Comfort and Wellbeing  Outcome: Progressing  Intervention: Provide Person-Centered Care  Recent Flowsheet Documentation  Taken 2024 by Alysha Arteaga, RN  Trust Relationship/Rapport:   care explained   choices provided   questions answered   questions encouraged   reassurance provided   thoughts/feelings acknowledged  Goal: Readiness for Transition of Care  Outcome: Progressing     Problem: Postpartum ( Delivery)  Goal: Successful Parent Role Transition  Outcome: Progressing  Intervention: Support Parent Role Transition  Recent Flowsheet Documentation  Taken 2024 by Alysha Arteaga, RN  Supportive Measures:   active listening utilized   decision-making supported   goal-setting facilitated   self-care encouraged   verbalization of feelings encouraged  Parent-Child Attachment Promotion:   interaction encouraged   parent/caregiver presence encouraged   participation in care promoted     Problem: Hypertensive Disorders in Pregnancy  Goal: Patient-Fetal Stabilization  Outcome: Progressing

## 2024-12-01 NOTE — PROGRESS NOTES
Initial Anesthesia Post-op Note    Patient: Emily Feliz  Procedure(s) Performed: LAPAROSCOPIC CHOLECYSTECTOMY WITH CHOLANGIOGRAM  Anesthesia type: General    Vitals Value Taken Time   Temp 98.1 07/18/21 1332   Pulse 105 07/18/21 1330   Resp 36 07/18/21 1329   SpO2 90 % 07/18/21 1330   /67 07/18/21 1330   Vitals shown include unvalidated device data.      Patient Location: PACU Phase 1  Post-op Vital Signs:stable  Handoff: Handoff to receiving clinician was performed and questions were answered      No complications documented.   [facility]   Postpartum Progress Note / Magnesium Check Note    Name:  Ravi Peñaloza  MRN: [id number]    S:  Ravi reports she is doing well this morning. States her pain is well controlled on current medication regimen. Ambulating without lightheadedness or dizziness. Voiding spontaneously and passing flatus. Lochia minimal and appropriate. Tolerating regular diet without nausea or vomiting. No chest pain, shortness of breath, fevers, chills, headache, visual changes, RUQ pain, or other systemic symptoms. Continues to pump for infant in the NICU. Comfortable with discharge today.     O:   Patient Vitals for the past 24 hrs:   BP Temp Temp src Pulse Resp Weight   24 0503 102/71 98  F (36.7  C) Oral 79 16 82.2 kg (181 lb 3.2 oz)   24 0100 (!) 130/93 -- -- 84 -- --   24 2127 (!) 139/94 -- -- 80 -- --   24 -- 97.8  F (36.6  C) Oral -- 17 --   24 1801 (!) 135/96 -- -- 76 16 --   24 1715 131/89 98.2  F (36.8  C) Oral -- 16 --   24 1410 (!) 142/101 -- -- -- 16 --   24 1150 (!) 128/91 -- -- 86 16 --   24 0843 (!) 129/94 98  F (36.7  C) Oral 87 16 --     Gen:      Resting comfortably, NAD  CV:       Regular rate and rhythm.  No murmurs.   Pulm:    Clear to auscultation bilaterally. No wheezes or crackles.   Abd:      Soft, appropriately tender to palpation, non-distended. Fundus at 2 cm below the umbilicus, firm and non-tender.   Incision:  Clean/dry/intact without surrounding erythema or drainage; Steri-strips in place without saturation  Ext:       Non-tender, trace edema to bilateral lower extremities    I/O last 3 completed shifts:  In:  [P.O.:]  Out:  [Urine:]    Assessment/Plan:  Ravi BENEDICT Estherjennifer 44 year old  who is POD #4 s/p PLTCS in the setting of non-reassuring fetal status remote from delivery. She is doing well in the postpartum period and meeting all goals for discharge home.  Working on blood pressure control     # Postpartum management  Pain: Well-controlled with ibuprofen, tylenol, and oxycodone  GI:  PRN bowel regimen, anti-emetics  : Voiding spontaneously  Hgb: Hgb 13>  > 9.1. Asymptomatic from acute blood loss anemia related to procedural blood loss; will discharge with oral iron given Hgb <10.   Rh: Positive  Rubella: Immune  Feed: Pumping for infant in the NICU  Infant:  Doing well in the NICU per patient's report  BC: Declines; discussed recommended interval pregnancy spacing of 18 months  PPx:  Encourage ambulation, IS, SCDs while confined to bed    # Pre-eclampsia with severe features (BP)  - Persistent low mild range blood pressures overnight   - Current regimen: Nifed 60/60, Labetalol 600 TID, D#2/7 HCTZ  - S/p 24 hours of magnesium sulfate infusion  -HELLP labs:- AST 51> 29; ALT 58>69>41, HELLP o/w wnl (11/28)  -IV hypertensives prn for sustained severe range BPs   -UOP adequate as above.   -Daily weights + strict I/Os    Medically Ready for Discharge: Anticipated Today (pending blood pressure control)    Diana Galindo MD, MPH, MS  Obstetrics, Gynecology & Women's Health   Resident, PGY-3  12/01/2024 8:31 AM

## 2024-12-02 ENCOUNTER — TELEPHONE (OUTPATIENT)
Dept: MATERNAL FETAL MEDICINE | Facility: CLINIC | Age: 44
End: 2024-12-02
Payer: COMMERCIAL

## 2024-12-02 ENCOUNTER — PATIENT OUTREACH (OUTPATIENT)
Dept: CARE COORDINATION | Facility: CLINIC | Age: 44
End: 2024-12-02
Payer: COMMERCIAL

## 2024-12-02 ENCOUNTER — LACTATION ENCOUNTER (OUTPATIENT)
Dept: OTHER | Facility: CLINIC | Age: 44
End: 2024-12-02

## 2024-12-02 NOTE — LACTATION NOTE
This note was copied from a baby's chart.  Lactation Follow Up Note    Reason for visit/ call/ message:  Breastfeeding support    Supply:  Stable, pumping q 3-4 hours expresses about 75 mL, feels left breast produces more than right.     Significant changes (medications, equipment, comfort, etc):  Latch attempt     Hand Hugs/ Skin to Skin/ Oral Care/ Nuzzling/ Latching:  Ravi brings baby to breast in football hold, LC reviews supportive hold and positioning. Baby is eager at breast and showing clear rooting cues with wide gape. She latches on easily with rhythmic suckling noted. Takes breaks and sometimes pulls off breast but then re-latches. Gavage running during BF attempt.     Education given:  Breastfeeding positioning and signs of a good latch, supportive hold, expected progression of breastfeeding, lactation support     Plan:  Continue to offer breast as able, focus on supportive positioning for a deep and effective latch.   Continue to pump regularly to support supply  Contact lactation as needed for support     Tiffanie Joy RN, IBCLC   Lactation Consultant  Lance: [facility] [phone]  Office: [phone]

## 2024-12-02 NOTE — PROGRESS NOTES
Clinic Care Coordination Contact  Transitions of Care Outreach    Chief Complaint   Patient presents with    Clinic Care Coordination - Initial     Birth and postpartum care,  at 33w1d, Gestational HTN, Severe fetal growth restriction (EFW 1%, AC <1%), Gestational Diabetes A2, Hypothyroid, cm left lateral fibroid & Advanced maternal age.   Discharge Diagnosis:  delivered at 33w6d via stated procedure below, Preeclampsia with severe features by blood pressure criteria & Acute blood loss anemia related to procedural blood loss         Most Recent Admission Date: 2024   Most Recent Admission Diagnosis: Birth and postpartum care,  at 33w1d, Gestational HTN, Severe fetal growth restriction (EFW 1%, AC <1%), Gestational Diabetes A2, Hypothyroid, cm left lateral fibroid & Advanced maternal age.      Most Recent Discharge Date: 2024   Most Recent Discharge Diagnosis: Severe pre-eclampsia in third trimester - O14.13  S/P  - Z98.891     Transitions of Care Assessment    Discharge Assessment  How are you doing now that you are home?: Good.  How are your symptoms? (Red Flag symptoms escalate to triage hotline per guidelines): Improved  Do you know how to contact your clinic care team if you have future questions or changes to your health status? : Yes  Does the patient have their discharge instructions? : Yes  Does the patient have questions regarding their discharge instructions? : No  Were you started on any new medications or were there changes to any of your previous medications? : Yes  Does the patient have all of their medications?: Yes  Do you have questions regarding any of your medications? : No  Do you have all of your needed medical supplies or equipment (DME)?  (i.e. oxygen tank, CPAP, cane, etc.): Yes              [facility] Worker Initial Outreach            Patient accepts CC: No, not a candidate . Patient will be sent Care Coordination introduction letter for future  reference.       Follow up Plan     Discharge Follow-Up  Discharge follow up appointment scheduled in alignment with recommended follow up timeframe or Transitions of Risk Category? (Low = within 30 days; Moderate= within 14 days; High= within 7 days): Yes  Discharge Follow Up Appointment Date: 12/03/24  Discharge Follow Up Appointment Scheduled with?: Specialty Care Provider (12/3/2024 10:10 AM Education, [facility] Obgyn Nurse [facility] [place] WOMEN [facility])    Future Appointments   Date Time Provider Department Center   12/3/2024 10:10 AM Education, [facility] Obgyn Nurse [facility]   12/9/2024 11:00 AM Zeenat Roth MD [facility]   1/6/2025 10:30 AM Vandana Trevizo MD [facility]       Outpatient Plan as outlined on AVS reviewed with patient.      For any urgent concerns, please contact our 24 hour nurse triage line: [phone]       Laura Chaudhary  Ambulatory Care  - [facility]

## 2024-12-03 ENCOUNTER — ALLIED HEALTH/NURSE VISIT (OUTPATIENT)
Dept: OBGYN | Facility: CLINIC | Age: 44
End: 2024-12-03
Attending: OBSTETRICS & GYNECOLOGY
Payer: COMMERCIAL

## 2024-12-03 VITALS — SYSTOLIC BLOOD PRESSURE: 90 MMHG | DIASTOLIC BLOOD PRESSURE: 65 MMHG

## 2024-12-03 DIAGNOSIS — R03.0 ELEVATED BLOOD PRESSURE READING WITHOUT DIAGNOSIS OF HYPERTENSION: Primary | ICD-10-CM

## 2024-12-03 DIAGNOSIS — I10 HTN (HYPERTENSION): ICD-10-CM

## 2024-12-03 LAB
PATH REPORT.COMMENTS IMP SPEC: NORMAL
PATH REPORT.FINAL DX SPEC: NORMAL
PATH REPORT.GROSS SPEC: NORMAL
PATH REPORT.MICROSCOPIC SPEC OTHER STN: NORMAL
PATH REPORT.RELEVANT HX SPEC: NORMAL
PHOTO IMAGE: NORMAL

## 2024-12-03 RX ORDER — NIFEDIPINE 30 MG
30 TABLET, EXTENDED RELEASE ORAL 2 TIMES DAILY
Qty: 90 TABLET | Refills: 0 | Status: SHIPPED | OUTPATIENT
Start: 2024-12-03

## 2024-12-03 RX ORDER — NIFEDIPINE 30 MG
30 TABLET, EXTENDED RELEASE ORAL DAILY
Qty: 90 TABLET | Refills: 0 | Status: SHIPPED | OUTPATIENT
Start: 2024-12-03 | End: 2024-12-03

## 2024-12-03 NOTE — PROGRESS NOTES
Patient in for BP check. Was discharged from hospital on 12/1/24 with Severe Preeclampsia. Average BP was 88/65 map 73. Experiencing dizziness with blurred vision. Spoke with Dr. Solis. Patient to Decrease Nifedipine to 30 mg BID and take BP before any medication dosing.

## 2024-12-04 ENCOUNTER — MYC MEDICAL ADVICE (OUTPATIENT)
Dept: OBGYN | Facility: CLINIC | Age: 44
End: 2024-12-04
Payer: COMMERCIAL

## 2024-12-05 ENCOUNTER — LACTATION ENCOUNTER (OUTPATIENT)
Dept: OTHER | Facility: CLINIC | Age: 44
End: 2024-12-05

## 2024-12-05 NOTE — LACTATION NOTE
This note was copied from a baby's chart.  Lactation Follow Up Note    Reason for visit/ call/ message:  Help with latch    Supply:  Stable (did not discuss numbers, was at 75ml every 3-4 hours)    Significant changes (medications, equipment, comfort, etc):  Frequent cues    Hand Hugs/ Skin to Skin/ Oral Care/ Nuzzling/ Latching:  We worked on latching.  Pablo was agitated by the time I arrived; we placed her skin to skin where she did some initial latching but was not able to maintain nor fall into a nutritive pattern.  We discussed nipple shield use, mom declined for today, would like to explore tomorrow.    Education given:  When test weights would be indicated  How a nipple shield can help    Plan:  Try shield tomorrow    Dodie Alberts, RNC-ALVIN, IBCLC   Lactation Consultant  Lance: [facility] [phone]  Office: [phone]

## 2024-12-07 ENCOUNTER — LACTATION ENCOUNTER (OUTPATIENT)
Dept: OTHER | Facility: CLINIC | Age: 44
End: 2024-12-07

## 2024-12-07 NOTE — LACTATION NOTE
This note was copied from a baby's chart.  Lactation Follow Up Note    Reason for visit/ call/ message:  Latch assist with nipple shield.     Supply:  100ml per pumping, every 3-4 hours.     Significant changes (medications, equipment, comfort, etc):  Improved comfort with 30mm flanges, nipples no longer rubbing in flange tunnel with pumping.     Hand Hugs/ Skin to Skin/ Oral Care/ Nuzzling/ Latching/Education:   Assisted dyad with latching session; Ravi held Tim in cross cradle hold with good infant and breast support. Encouraged mom to bring infant up to her vs leaning into infant for latch. I fitted her with a 20 mm shield and instructed her in its use. We discussed supportive hold, positioning, latch, feeding cues, breast support, use/rationale of the nipple shield, skin to skin benefits, and timing of pumpings around breastfeedings. After a few attempts, Tim was able to open mouth wide and latch to breast with nipple shield with drops of expressed milk. She got into non-nutritive sucking pattern quickly, before falling off with repositioning. Dyad worked on re-latching a few times with similar result before Tim fatigued and transitioned to full skin to skin. We discussed non-nutritive vs nutritive sucking, future listening for swallowing, ensuring deep latch being careful not to hold breast too close to nipple. Reassured that time, practice, and patience are all part of the equation when learning to breast feed. Mom reports becoming more and more comfortable with each session at breast.     Plan:  Continue to follow milk supply, comfort with pumping.   Latch assist per request.     ALBERTO Gagnon, RN, IBCLC   Lactation Consultant  Lance: [facility] [phone]  Office: [phone]

## 2024-12-07 NOTE — LACTATION NOTE
This note was copied from a baby's chart.  Lactation Follow Up Note    Reason for visit/ call/ message:  Latch assist.    Supply:   ml every 3-4 hours.     Significant changes (medications, equipment, comfort, etc):  Pain with pumping at nipples bilaterally; no cracks, blisters.     Hand Hugs/ Skin to Skin/ Oral Care/ Nuzzling/ Latching:  Skin to skin holding often.   More frequent cues, starting to work on latching.   Plan was to assist with nipple shield practice today (nipple shield not available in NICU, took time to get from [facility]), infant explored at breast without shield, opening mouth wide a few times, licking at nipple, then feel asleep. Talked about use of nipple shield, rational, and application to breast. Discussed meeting tomorrow for latching session with nipple shield.     Education given:  Discussed flange fit; mom reports nipples are rubbing in flange tunnel when using 27mm flanges. I gave her 30mm flanges to try overnight, and encouraged to keep suction at comfortable level while optimizing hands on pumping (gave band to make hands free pumping band), and hand expression. Could try expressed breast milk, or pumping lubricant if needed. Encouraged pumping every 2-3hours during day, 3-4hours at night or on [facility]'s pumping schedule when rooming in.     Plan:  Mom will try 30mm flanges for pumping, assess flange fit.   Meet with dyad tomorrow for latching session.     ALBERTO Gagnon, RN, IBCLC   Lactation Consultant  Lance: [facility] [phone]  Office: [phone]

## 2024-12-07 NOTE — PROGRESS NOTES
[facility]    CC: Postpartum visit    Subjective:   Ravi Peñaloza is a 44 year old  who presents for a postpartum visit. She had a female infant by primary CS on 24 at 33w6d.   complications:gHTN, severe fetal growth restriction, GDMA2, hypothyroidism, large uterine myoma, AMA. Postpartum complications: preE with SF, anemia.     Patient endorses pumping 100-120mL 6-8x/day. Her infant is in the NICU, and she recently started breast feeding. Lochia is minimal. Her mood is up and down with baby in the NICU. She has good bowel and bladder function. Currently on nifedipine 30/30 and labetalol 300 TID for BP medications. Has some dizziness and headaches at night. BP at that time is 97-87/67. No headaches during the day. Noticed similar symptoms when she had to drop nifedipine 60mg to 30mg.     She is unsure if she will want another child in the future and whether she will want to proceed with myomectomy.     Last pap: 10/2023 NILM  Hemoglobin at discharge: 9.1    Final Diagnosis   A. Monge placenta, 33 weeks 6 day gestation, 254.5 grams (<5th percentile),  delivery:      Fetal membranes:                 - No significant pathologic changes.     Placental disc:  - Chorionic villi with accelerated maturation for gestational age.  - Increased syncytial knots.  - Retroplacental hematoma with associated villous infarct (1.7 cm).  - Placental infarct (single, recent, 4 cm, <1% of placental disc).     - Basal plate myometrial fibers, stage 1 (decidua present, one area, up to 2 mm).     Umbilical cord:  - Three vessels, hypocoiling (1 coil/10 cm).      See Comment.   Electronically signed by Darinel Handy MD on 12/3/2024 at  4:03 PM   Comment  [id number]   The findings of low placental weight (<5th percentile), accelerated villous maturation, increased syncytial knots, placental infarct, and retroplacental hematoma are among pathologic findings indicative of maternal vascular  "malperfusion (MVM). Pathologic features of MVM can be seen in maternal conditions with a component of vascular disease (e.g. pre-eclampsia, hypertension, diabetes mellitus, and autoimmunity). There can be an increased risk for recurrence in future pregnancies. Clinical correlation is recommended.      The presence of myometrial fibers adherent to the basal plate suggests functional placenta accreta.        Objective:  /87   Pulse 83   Ht 1.549 m (5' 1\")   Wt 81 kg (178 lb 9.6 oz)   LMP 2024 (Exact Date)   Breastfeeding Yes   BMI 33.75 kg/m    General: Well-appearing, no distress  Abdomen: Soft, nontender, no masses  Incision: Clean/dry/intact without surrounding erythema or drainage.    Extremities: No lower extremity edema    Assessment/plan:  Ravi Peñaloza is a 44 year old now  here for her two-week postpartum visit following a CS on 24 at 33w6d.     #Postpartum visit  -Contraception: undecided   -Discussed return to exercise  -Continue taking prenatal vitamin  -Discussed silicone patches for incision as prior incisions have healed poorly     #gHTN  - continue nifedipine 30 mg BID and decrease labetalol 300 mg BID, if symptoms continue will consider decreasing nifedipine  - continue with HOPE-BP    #Hypothyroidism  - continue levothyroxine 88 mcg    #Large uterine myoma  - discussed potential for interpregnancy removal but prolonged waiting period prior to another pregnancy if that is the case. This may be difficult given AMA and this was a spontaneous pregnancy  - pathology report does not concern for PAS. This was not noted at the time of delivery in the operative note or by discussing with the delivering provider. Her large myoma may be a factor in abnormal placental implantation, FGR and preE.   - Recommend preconception visit if considering another pregnancy     #Health maintenance  -Next Pap test due   -Follow-up at 6 weeks for routine post-partum care  -Immunizations " reviewed    Patient case discussed with Dr. Ira Thrasher, MS3    I was present with the medical student who participated in the service and in the documentation of the note. I have verified the history and personally performed the physical exam and medical decision making. I agree with the assessment and plan of care as documented in the note.    Zeenat Roth MD

## 2024-12-09 ENCOUNTER — PRENATAL OFFICE VISIT (OUTPATIENT)
Dept: OBGYN | Facility: CLINIC | Age: 44
End: 2024-12-09
Attending: STUDENT IN AN ORGANIZED HEALTH CARE EDUCATION/TRAINING PROGRAM
Payer: COMMERCIAL

## 2024-12-09 VITALS
BODY MASS INDEX: 33.72 KG/M2 | WEIGHT: 178.6 LBS | HEART RATE: 83 BPM | SYSTOLIC BLOOD PRESSURE: 118 MMHG | DIASTOLIC BLOOD PRESSURE: 87 MMHG | HEIGHT: 61 IN

## 2024-12-09 DIAGNOSIS — O14.13 SEVERE PRE-ECLAMPSIA IN THIRD TRIMESTER: ICD-10-CM

## 2024-12-09 DIAGNOSIS — D25.1 INTRAMURAL AND SUBSEROUS LEIOMYOMA OF UTERUS: ICD-10-CM

## 2024-12-09 DIAGNOSIS — D25.2 INTRAMURAL AND SUBSEROUS LEIOMYOMA OF UTERUS: ICD-10-CM

## 2024-12-09 DIAGNOSIS — E06.3 HYPOTHYROIDISM DUE TO HASHIMOTO THYROIDITIS: ICD-10-CM

## 2024-12-09 PROCEDURE — 99215 OFFICE O/P EST HI 40 MIN: CPT | Performed by: STUDENT IN AN ORGANIZED HEALTH CARE EDUCATION/TRAINING PROGRAM

## 2024-12-09 RX ORDER — LABETALOL 300 MG/1
600 TABLET, FILM COATED ORAL 2 TIMES DAILY
Status: SHIPPED
Start: 2024-12-09

## 2024-12-09 RX ORDER — LEVOTHYROXINE SODIUM 88 UG/1
88 TABLET ORAL DAILY
Status: SHIPPED
Start: 2024-12-09

## 2024-12-09 ASSESSMENT — PATIENT HEALTH QUESTIONNAIRE - PHQ9
5. POOR APPETITE OR OVEREATING: NOT AT ALL
SUM OF ALL RESPONSES TO PHQ QUESTIONS 1-9: 1

## 2024-12-09 ASSESSMENT — ANXIETY QUESTIONNAIRES
7. FEELING AFRAID AS IF SOMETHING AWFUL MIGHT HAPPEN: NOT AT ALL
2. NOT BEING ABLE TO STOP OR CONTROL WORRYING: NOT AT ALL
6. BECOMING EASILY ANNOYED OR IRRITABLE: NOT AT ALL
5. BEING SO RESTLESS THAT IT IS HARD TO SIT STILL: NOT AT ALL
3. WORRYING TOO MUCH ABOUT DIFFERENT THINGS: NOT AT ALL
GAD7 TOTAL SCORE: 0
GAD7 TOTAL SCORE: 0
1. FEELING NERVOUS, ANXIOUS, OR ON EDGE: NOT AT ALL

## 2024-12-09 NOTE — PATIENT INSTRUCTIONS
Thank you for trusting us with your care!   Please be aware, if you are on Mychart, you may see your results prior to your providers review. If labs are abnormal, we will call or message you on [facility] with a follow up plan.    If you need to contact us for questions about:  Symptoms, Scheduling & Medical Questions; Non-urgent (2-3 day response) [facility] message, Urgent (needing response today) [phone] (if after 3:30pm next day response)   Prescriptions: Please call your Pharmacy   Billing: [facility] [phone] or [facility]:[phone]

## 2024-12-10 PROBLEM — D25.2 INTRAMURAL AND SUBSEROUS LEIOMYOMA OF UTERUS: Status: ACTIVE | Noted: 2024-12-10

## 2024-12-10 PROBLEM — D25.1 INTRAMURAL AND SUBSEROUS LEIOMYOMA OF UTERUS: Status: ACTIVE | Noted: 2024-12-10

## 2024-12-10 PROBLEM — E06.3 HYPOTHYROIDISM DUE TO HASHIMOTO THYROIDITIS: Status: ACTIVE | Noted: 2024-12-10

## 2024-12-17 ENCOUNTER — LACTATION ENCOUNTER (OUTPATIENT)
Dept: OTHER | Facility: CLINIC | Age: 44
End: 2024-12-17

## 2024-12-17 NOTE — LACTATION NOTE
This note was copied from a baby's chart.  Lactation Follow Up Note    Reason for visit/ call/ message:  Maternal request, observe latch.     Supply:  Pumping about 6x per day, 110-120ml per pumping.     Significant changes (medications, equipment, comfort, etc):  Increasing in latch frequency, mom concerned Tim is getting her hands in the way when latching, bobbing/searching on and off breast at times.     Hand Hugs/ Skin to Skin/ Oral Care/ Nuzzling/ Latching:  Latching and holding skin to skin often.   Tim was latched as we talked, with wide open gape, upper lip well flanged, lower lip slightly flanged, sucking in nutritive sucking pattern with audible swallows and great jaw excursion. Transferred 22ml per scale weight.     Education given:  Reviewed recommended pumping schedule, ideally 8x per day or more (Ravi is aware of recommendation, however this is what she can manage currently), benefits of hands on pumping, hand expression.   Normal behaviors for  and late  infants, increased hunger cues (hands and mouth cues), rooting behaviors. Offering good support for infant and breast when latching, following infant cues of awake/alert and hungry as well as sleepy/satiated when at breast (attempt to wake if sleepy early on, but also respect cues if remaining sleepy). Ways to encouraged Tim to hug the breast to keep hands busy, or tuck gently parallel to her body. Currently not using nipple shield, if bobbing behaviors are paired with frustration at breast with latching then slipping off (vs normal rooting), could revisit nipple shield (mom prefers strongly no use of nipple shield and baby has been doing well without). Fluctuation in volumes transferred can be very normal, can enhance transfer with gentle breast compressions.   Encourage lower lip flange with asymmetrical latch, aiming nipple for roof of mouth, exercises to help get mouth ready for feeding (can discuss with OT).    Celebrated dyads success; encouraged continued latching, frequent practice, time management, tincture of time for infant maturity.     Plan:  Supply check around DOL 30.   Latch assist per request.     ALBERTO Gagnon, RN, IBCLC   Lactation Consultant  Lance: [facility] [phone]  Office: [phone]

## 2024-12-18 ENCOUNTER — TELEPHONE (OUTPATIENT)
Dept: MATERNAL FETAL MEDICINE | Facility: CLINIC | Age: 44
End: 2024-12-18
Payer: COMMERCIAL

## 2024-12-18 DIAGNOSIS — I10 HTN (HYPERTENSION): ICD-10-CM

## 2024-12-18 DIAGNOSIS — O14.13 SEVERE PRE-ECLAMPSIA IN THIRD TRIMESTER: ICD-10-CM

## 2024-12-18 RX ORDER — NIFEDIPINE 30 MG
30 TABLET, EXTENDED RELEASE ORAL 2 TIMES DAILY
Qty: 90 TABLET | Refills: 0 | Status: SHIPPED | OUTPATIENT
Start: 2024-12-18 | End: 2025-02-01

## 2024-12-18 RX ORDER — LABETALOL 200 MG/1
200 TABLET, FILM COATED ORAL 2 TIMES DAILY
Qty: 90 TABLET | Refills: 0 | Status: SHIPPED | OUTPATIENT
Start: 2024-12-18 | End: 2025-02-01

## 2024-12-18 NOTE — TELEPHONE ENCOUNTER
Home Observation of Postpartum Elevated BP (HOPE-BP) Program     Ravi Peñaloza enrolled in the HOPE-BP Program on 12/1/2024, 17 days ago. Delivered on 11/27/2024. Diagnosis: Preeclampsia with severe features. Medication(s) prescribed: Labetalol and Nifedipine ER.  Patient reported the following blood pressures in the past 72 hours:       12/12/2024 12/13/2024 12/14/2024 12/15/2024 12/16/2024 12/17/2024 12/18/2024   [facility] Health Trends BP Review Flowsheet   Systolic (Patient Reported) 124  113     141  124  132  119  122  125    Diastolic (Patient Reported) 85  77     92  87  88  92  91  95        Patient-reported    Multiple values from one day are sorted in reverse-chronological order     Spoke with patient to follow up on blood pressure. Based on self-report, Ravi's blood pressures were in the High Range (-159 OR DBP ). Patient reported no blood pressure related symptoms. Patient denied: headaches, chest pain, difficulty breathing or shortness of breath, unsteady gait, weakness, blurred vision, dizziness, right upper quadrant pain, lip swelling (angioedema), difficulty swallowing, palpitations, rash, acute joint redness or pain (gout), leg edema, and symptomatic hypotension.  PT states she met with Dr. Roth on 12/9 and medications were decreased..  Pt states she is taking Nifedipine 30 mg BID and Labetalol 150 mg BID, not Labetalol 300 mg BID as ordered .  PT needs a refill of Labetalol. BP continues to be elevated, VORB per Ina Christopher CNM, order Labetalol 200 mg BID to pts preferred pharmacy, will adjust as needed in the future. Refill also sent for Nifedipine.    Hafsa Aguiar RN

## 2024-12-25 ENCOUNTER — LACTATION ENCOUNTER (OUTPATIENT)
Dept: OTHER | Facility: CLINIC | Age: 44
End: 2024-12-25

## 2024-12-25 NOTE — LACTATION NOTE
"This note was copied from a baby's chart.  LACTATION DISCHARGE INSTRUCTIONS      Congratulations on your approaching discharge day!  Our goal is to help you have all the information, skills and equipment you need to help you meet your lactation goals at home.      Please follow the feeding plan from your medical team upon discharge. The below information is for future reference with help from your outpatient medical team/ lactation consultant.     CDC HANDOUT ON STORING AND PREPARING HUMAN MILK AT HOME    Please see attached handout   https://www.cdc.gov/breastfeeding/recommendations/handling_breastmilk.htm      FEEDING LOG: BABY'S FIRST WEEK, SECOND WEEK AND BEYOND    Please see attached feeding logs  Goal is to eat at least 8 times in 24 hours  Goal is to have at least 6 wet diapers in 24 hours  Talk to your provider about goal for soiled diapers.  Each baby is different depending on age and what they are eating      OTHER DISCHARGE INFORMATION    Medications:   Some women may find certain types of hormonal birth control, decongestants or antihistamines may impact supply-- talk to your provider.  Always get a second opinion from a lactation consultant or a provider familiar with lactation if told to stop latching or \"pump and dump\" when starting a new medication, having a procedure or you are ill; most of the time things are compatible.      TRANSITIONING TO MORE FEEDINGS AT HOME    Often, babies go home from the NICU doing a combination of breastfeeding and bottle feeding.  With time and patience, most will go on to nurse most or all their feedings.  infants, in particular, may not be able to fully nurse until at or after their due date. To ensure your baby is taking adequate volumes, some babies may need supplemental bottle after breastfeeding. Keep these things in mind as you nurse your baby at home:    Good time management is key!  Make feedings efficient so you have time to eat, sleep, and pump.    It " "is important to latch your baby frequently, even if he or she is taking small amounts. Staying skin to skin will also help keep your baby \"breast oriented\".  Going days without latching will make it more difficult.  Babies can be re-taught how to latch, but this is very time consuming and not always successful.        Please see a lactation consultant ASAP if you are not meeting your latching goal.  It is easier to make changes now, versus weeks or months down the road.      HOW TO WEAN FROM THE NIPPLE SHIELD    Many NICU babies use a nipple shield for a period of time, especially premature babies and babies recovering from illness or surgery.  It helps them stay latched on and get milk more easily.    How do you know it's time to try off the nipple shield?    Your baby is waking on their own before feedings  Your baby is able to stay awake during the entire feeding, without a lot of encouragement to stay awake  Your baby's suck is significantly stronger   Your baby is taking full feedings at the breast  Typically, at or after their due date    How do I wean off the nipple shield?    Start the feeding with the nipple shield in place, then take the nipple shield off MCC through the feeding and re-latch  Try at feedings where your baby is calm, not when they are frantically hungry  Middle of the night can be a good time to try, when everyone is relaxed  https://www.[employer].com/blog/my-ten-step-process-for-weaning-from-the-nipple-shield/    How do I know my baby is eating well without the nipple shield?    They seem satisfied after feeding  Your breasts feels softer after the feeding  Your baby has enough wet and soiled diapers  If using a breastfeeding scale-- the numbers on the scale are similar to a feeding with a nipple shield  If you have problems getting off the nipple shield, please make an appointment with a lactation consultant.      HOW TO WEAN FROM THE PUMP (AFTER YOUR BABY TAKES A FULL " "BREASTFEEDING)    Your milk supply may be greater than what your baby needs after discharge. It is important that you gradually wean from pumping after your baby takes a full breastfeeding (without needing a top-off).  If you wean too quickly, you will be uncomfortable and you run the risk of causing your supply to drop.    If you have been pumping less than two weeks:    If you are uncomfortable after a full breastfeeding, pump only until you are comfortable (versus pumping until empty)      If you have been pumping two weeks or more:    Continue to pump after every breastfeeding, but gradually decrease the time or volume you pump.   Example based on time: If you have been pumping 20 minutes after each full breastfeeding, decrease to 18 minutes for two days. If still comfortable, decrease to 16 minutes for another two days.   Example based on volume: If you normally pump 2 oz after a feeding, pump 1.75 oz for a few days, 1.5 oz for a few days, etc  Continue this way until you no longer need to pump (after breastfeeding).    Remember that if you are bottle feeding some feedings, you need to pump at the time you would have latched your baby. If you do not, you might start decreasing your milk supply.        OTHER LATCHING INFORMATION    Growth Spurts: Common times for \"growth spurts\" are around 7-10 days, 2-3 weeks, 4-6 weeks, 3 months, 4 months, 6 months and 9 months, but these vary widely between babies.  During these times allow your baby to nurse very frequently (or pump more frequently) to temporarily boost your supply, as opposed to supplementing.  It should pass in a few days when your supply increases, and your baby will settle into a new feeding pattern.    How to get a breastfeeding test weight scale:   Rental (2ml sensitivity):   Health enymotion ([facility]) [phone]   [facility] [employer] [facility] SpinGo ([facility]) [phone]  [employer]) [phone]   Purchase scale (6ml sensitivity): " "  \"Dinero Baby Scale\" (Target, Amazon, etc), around $150      LACTATION SUPPORT     [place] [phone]  CATALINO Lawrence, CNARIANE, IBCLC  Tuesday:  [facility]  Wednesday:  [facility], 7th floor,   Thursday:  [facility], [facility]--[facility] [phone]  [facility] [phone]  [place]-- [facility] [phone] or [phone]  [facility]-- [place] Range    Viktoria Parenting Mary (Tuesday 1-2pm Infant Feeding Q&A)     [phone]  [place] Baby Weigh In (Thursday 9:30am Lactation Lounge)    www.GleeMaster ++HAS VIRTUAL SUPPORT++   Enlightened Mama   www.Atlantis ComputingenedmamaMimvi [phone]  Home or in-office ([place])  Everyday Miracles         https://www.everyday-miracles.org/  [facility]     [phone] ++HAS VIRTUAL SUPPORT++   Lubna Mosqueda DO, MPH, ABO, IBCLC  Integrative Family Medicine Physician/Breastfeeding Medicine/ Home visits  www.[email or web address]  [phone]  [facility] \"Well Fed\" postpartum group ([facility])   [phone]  Support in other languages:  Emirati:  Marsha (IBCLC/ Emir[place]) [phone]  [email or web address].  La Leche League: Si quieres ayuda en espanol con jarred pecho por favor llama Rachel al [phone].  Antonio Ragland [facility] & [place]  For more information call [facility] [phone] or [facility] [phone] (South English) or Najma Carbajal at [phone] ([place]).  [place]: , 10:30 am to noon. [place] [facility]-[address] [place] [address]: , 1:00-2:00 PM. [facility]" "[facility], [address] [facility]  Ariela (Hmong) [phone]  Neftali (Mexican) [phone]   and Black American breastfeeding support:  [facility] [place] ([place])     www.[facility]Sull[facility]anozukeerStereotypes  [phone]  Chocolate Milk Club:    http://www.[employer] Technologies.Securisyn Medical/chocolate-milk-club/  Dr. Dominique Laurent, [phone]  DIVA Moms (Dynamic Involved Valued  Moms)   [phone]  Scopix  [phone] or Margherita [email or web address]  https://www.Smartesting.com/Blackfamiliesdobreastfeed  [facility]:  See Facebook site  Eri Pickard [email or web address]  [phone]  Indigenous Breastfeeding [facility] of [place]      See Facebook site or Google \"Fidelia Chambers\"  Shannan [email or web address] Medical  [phone]  Kym Costa, [email or web address]   [phone]      [facility] Lactation Support  [facility], Pediatrics & Adolescent Medicine: [phone], ext. [phone]. Outpatient appointments, phone assist   [facility] OBGYN, [phone]. Outpatient appointments, phone assist   Critical access hospital, [phone]. Inpatient services, outpatient appointments, phone assist   [facility], Inpatient services only   [facility], [phone]. Inpatient services, outpatient appointments, phone assist, 24-hour availability   [facility] [place], [phone]. Inpatient services, outpatient appointments, phone assist   [facility], [phone], ext. [phone]. Inpatient services, phone assist -- Hours: 7 a.m. to 3:30 p.m. every day. After hours: Messages will be returned within 24 hours.    Telephone and Online Support    Applause: Offers evidence-based information on breastfeeding/ chestfeeding and parenting.    La Leche League  [phone] (answered 24 hours a day)   www.londas.org  Offers support for human milk feeding families.   Call to " find a group in your area or online in English/ Maori    National Women s Health Information Center   [phone]  www.womenshealth.gov/breastfeeding  Offers a human milk feeding information line in English and Maori.    WIC (Women, Infants and Children) Program   [phone]  Offers human milk feeding counseling. Call to find an office near you.    BabyCafes (www.babycafeusa.org) (now in person)    International Breastfeeding Lamb (Kamran Thrasher)  http://ibconline.ca/    The InfantRisk Call Center is available to answer questions about the use of medications during pregnancy and while breastfeeding  [phone]  www.infantrisBookBottles.com

## 2024-12-25 NOTE — LACTATION NOTE
This note was copied from a baby's chart.  I met with Ravi for discharge teaching and gave pertinent handouts (see below).  She has been latching every other feeding followed by bottle, pumping after latching sessions and typically with all bottle feedings (occasionally at night going 4-5 hours); volumes have been around 80ml per pumping. Reports latching is going well, discussed signs of good latch, signs of a good feeding session; all feedings at breast to be followed by bottle feeding currently. Encouraged seeking outpatient support as needed. Teaching completed, all questions answered and readiness to go home is verbalized.      ALBERTO Gagnon, RN, IBCLC   Lactation Consultant  [place]: Lactation Specialist Group: [phone]  Office: [phone]    [x]Discharge-- [facility] [place] 2[facility] milk storage  [x]Discharge-- After first week feeding log  [x]Discharge-- [facility] peer counselor  [x]Discharge-- [place] lactation resources

## 2024-12-30 ENCOUNTER — TELEPHONE (OUTPATIENT)
Dept: MATERNAL FETAL MEDICINE | Facility: CLINIC | Age: 44
End: 2024-12-30
Payer: COMMERCIAL

## 2024-12-30 NOTE — TELEPHONE ENCOUNTER
Home Observation of Postpartum Elevated BP (HOPE-BP) Program     Ravi Peñaloza enrolled in the HOPE-BP Program on 12/1/2024, 29 days ago. Delivered on 11/27/2024. Diagnosis: Preeclampsia with severe features. Medication(s) prescribed: Labetalol and Nifedipine ER.  Patient reported the following blood pressures in the past 72 hours:       12/21/2024 12/22/2024 12/23/2024 12/24/2024 12/25/2024 12/26/2024 12/27/2024   [facility] Health Trends BP Review Flowsheet   Systolic (Patient Reported) 117    111 113    116 122 124 122    125 117    110 113    122   Diastolic (Patient Reported) 77    87 78    91 88 94 83    95 82    81 78    90       Multiple values from one day are sorted in reverse-chronological order     Spoke with patient who has not entered a BP reading in 3 days. Pt states she is checking BP but has been busy with her baby. Pt states BP running 120s/90s. Confirmed medications nifedipine 30mg bid and labetalol 200mg BID. Pt states she will continue to check BP and enter in Digital H2Ohart.    JENIFER OCONNELL RN

## 2025-01-06 ENCOUNTER — DOCUMENTATION ONLY (OUTPATIENT)
Dept: MATERNAL FETAL MEDICINE | Facility: CLINIC | Age: 45
End: 2025-01-06
Payer: COMMERCIAL

## 2025-01-06 ENCOUNTER — LAB (OUTPATIENT)
Dept: LAB | Facility: CLINIC | Age: 45
End: 2025-01-06
Attending: OBSTETRICS & GYNECOLOGY
Payer: COMMERCIAL

## 2025-01-06 ENCOUNTER — PRENATAL OFFICE VISIT (OUTPATIENT)
Dept: OBGYN | Facility: CLINIC | Age: 45
End: 2025-01-06
Attending: OBSTETRICS & GYNECOLOGY
Payer: COMMERCIAL

## 2025-01-06 DIAGNOSIS — E06.3 HYPOTHYROIDISM DUE TO HASHIMOTO THYROIDITIS: ICD-10-CM

## 2025-01-06 DIAGNOSIS — E03.4 HYPOTHYROIDISM DUE TO ACQUIRED ATROPHY OF THYROID: ICD-10-CM

## 2025-01-06 DIAGNOSIS — I10 PRIMARY HYPERTENSION: ICD-10-CM

## 2025-01-06 DIAGNOSIS — D25.1 INTRAMURAL LEIOMYOMA OF UTERUS: ICD-10-CM

## 2025-01-06 LAB — TSH SERPL DL<=0.005 MIU/L-ACNC: 1.63 UIU/ML (ref 0.3–4.2)

## 2025-01-06 PROCEDURE — 99211 OFF/OP EST MAY X REQ PHY/QHP: CPT | Performed by: OBSTETRICS & GYNECOLOGY

## 2025-01-06 PROCEDURE — 84443 ASSAY THYROID STIM HORMONE: CPT

## 2025-01-06 PROCEDURE — 36415 COLL VENOUS BLD VENIPUNCTURE: CPT

## 2025-01-06 RX ORDER — LABETALOL 200 MG/1
200 TABLET, FILM COATED ORAL 2 TIMES DAILY
Qty: 90 TABLET | Refills: 0 | Status: SHIPPED | OUTPATIENT
Start: 2025-01-06

## 2025-01-06 RX ORDER — NIFEDIPINE 30 MG
30 TABLET, EXTENDED RELEASE ORAL 2 TIMES DAILY
Qty: 90 TABLET | Refills: 0 | Status: SHIPPED | OUTPATIENT
Start: 2025-01-06

## 2025-01-06 RX ORDER — LEVOTHYROXINE SODIUM 88 UG/1
88 TABLET ORAL DAILY
Qty: 90 TABLET | Refills: 3 | Status: SHIPPED | OUTPATIENT
Start: 2025-01-06

## 2025-01-06 ASSESSMENT — PATIENT HEALTH QUESTIONNAIRE - PHQ9
SUM OF ALL RESPONSES TO PHQ QUESTIONS 1-9: 1
5. POOR APPETITE OR OVEREATING: NOT AT ALL

## 2025-01-06 ASSESSMENT — ANXIETY QUESTIONNAIRES
3. WORRYING TOO MUCH ABOUT DIFFERENT THINGS: NOT AT ALL
5. BEING SO RESTLESS THAT IT IS HARD TO SIT STILL: NOT AT ALL
GAD7 TOTAL SCORE: 0
GAD7 TOTAL SCORE: 0
1. FEELING NERVOUS, ANXIOUS, OR ON EDGE: NOT AT ALL
2. NOT BEING ABLE TO STOP OR CONTROL WORRYING: NOT AT ALL
7. FEELING AFRAID AS IF SOMETHING AWFUL MIGHT HAPPEN: NOT AT ALL
6. BECOMING EASILY ANNOYED OR IRRITABLE: NOT AT ALL

## 2025-01-06 NOTE — NURSING NOTE
Chief Complaint   Patient presents with    Post Partum Exam      2024          SUBJECTIVE:   Ravi Peñaloza is here for her 6-week postpartum checkup.     PHQ-9 score:   Hx of Abuse:  No    Delivery Date: 2024.    Delivering provider:  Zhanna Horn MD.    Type of delivery:  .     Delivery complications:   Infant gender:  girl, weight  pounds  oz.  Feeding Method:   and Bottlefed.  Complications reported with feeding:  none, infant thriving .    Bleeding:  None.  Duration:  .  Menses resumed:  No  Bowel/Urinary problems:  No    Contraception Planned:  abstinence  She  has not had intercourse since delivery..

## 2025-01-06 NOTE — PROGRESS NOTES
Nursing Notes:   Rhonda Hectorerie, LPN  2025 11:06 AM  Signed  Chief Complaint   Patient presents with    Post Partum Exam      2024          SUBJECTIVE:   Ravi Peñaloza is here for her 6-week postpartum checkup.     PHQ-9 score:   Hx of Abuse:  No    Delivery Date: 2024.    Delivering provider:  Zhanna Horn MD.    Type of delivery:  .     Delivery complications:   Infant gender:  girl, weight  pounds  oz.  Feeding Method:   and Bottlefed.  Complications reported with feeding:  none, infant thriving .    Bleeding:  None.  Duration:  .  Menses resumed:  No  Bowel/Urinary problems:  No    Contraception Planned:  abstinence  She  has not had intercourse since delivery..        Pt is s/p primary c/s on 24 at 33+6 weeks in the setting of severe FGR, A2GDM, 11 cm uterine fibroid.  She is doing well overall-just got home from the NICU at the end of December.  She is mostly pumping and feeding expressed breast milk along with fortified formula. Her baby is getting better at feeding at the breast she she is hoping to eventually transition to all feedings at the breast.  Her parents are here and her mom is a great help.  She is returning to remote work next week but feels good about that.  She continues on Nifedipine and labetalol for her blood pressure.  Her pap screening is up to date-she believes that she had a pap at the beginning of her pregnancy at her prior clinic in [place].     ================================================================  ROS: 10 point ROS neg other than the symptoms noted above in the HPI.     EXAM:  LMP 2024 (Exact Date)     General: healthy, alert, and no distress  Psych: NEGATIVE  Last PHQ-9 score on record= No Value exists for the : HP#PHQ9  Breasts:  Lactating  Abdomen: Benign, Soft, flat, non-tender, No masses, organomegaly, and Diastasis less than 1-2 FB  Incision:  well healed   Pelvic exam deferred       ASSESSMENT:     44  y/o  6 weeks s/p primary c/s, doing well.  Pregnancy was complicated by multiple issues.  She is undecided on future fertility at this time-thinking probably only going to have one child.  Discussed evaluation of her fibroid in another 6 weeks to assess size and location and whether she would consider interval myomectomy vs expectant management if asymptomatic and not planning another pregnancy.  Recommended that she see Dr. Castelan in 6 weeks to establish care for chronic HTN and to discuss ongoing surveillance for type II DM (doesn't really have time right now for a 2h GTT).  Postpartum TSH today for surveillance of pre-pregnancy hypothyroidism.        Encounter Diagnoses   Name Primary?    Severe pre-eclampsia in third trimester     Hypothyroidism due to Hashimoto thyroiditis     HTN (hypertension)     Intramural leiomyoma of uterus     Hypothyroidism due to acquired atrophy of thyroid     Primary hypertension     Postpartum care and examination of lactating mother Yes           PLAN:  Orders Placed This Encounter   Procedures    US Pelvic Complete with Transvaginal    TSH with free T4 reflex      Orders Placed This Encounter   Medications    labetalol (NORMODYNE) 200 MG tablet     Sig: Take 1 tablet (200 mg) by mouth 2 times daily.     Dispense:  90 tablet     Refill:  0    levothyroxine (SYNTHROID/LEVOTHROID) 88 MCG tablet     Sig: Take 1 tablet (88 mcg) by mouth daily.     Dispense:  90 tablet     Refill:  3    NIFEdipine ER (ADALAT CC) 30 MG 24 hr tablet     Sig: Take 1 tablet (30 mg) by mouth 2 times daily.     Dispense:  90 tablet     Refill:  0        Vandana Trevizo MD, FACOG

## 2025-02-11 ENCOUNTER — MYC REFILL (OUTPATIENT)
Dept: OBGYN | Facility: CLINIC | Age: 45
End: 2025-02-11
Payer: COMMERCIAL

## 2025-02-11 DIAGNOSIS — I10 PRIMARY HYPERTENSION: ICD-10-CM

## 2025-02-17 RX ORDER — NIFEDIPINE 30 MG
30 TABLET, EXTENDED RELEASE ORAL 2 TIMES DAILY
Qty: 90 TABLET | Refills: 1 | Status: SHIPPED | OUTPATIENT
Start: 2025-02-17

## 2025-02-18 ENCOUNTER — TELEPHONE (OUTPATIENT)
Dept: OBGYN | Facility: CLINIC | Age: 45
End: 2025-02-18
Payer: COMMERCIAL

## 2025-02-18 NOTE — TELEPHONE ENCOUNTER
Received medication non-adherence therapy advisory. Placed in [facility]'s mailbox for Dr. Trevizo to review.

## 2025-02-21 ENCOUNTER — LAB (OUTPATIENT)
Dept: LAB | Facility: CLINIC | Age: 45
End: 2025-02-21
Attending: FAMILY MEDICINE
Payer: COMMERCIAL

## 2025-02-21 ENCOUNTER — OFFICE VISIT (OUTPATIENT)
Dept: FAMILY MEDICINE | Facility: CLINIC | Age: 45
End: 2025-02-21
Attending: FAMILY MEDICINE
Payer: COMMERCIAL

## 2025-02-21 ENCOUNTER — ANCILLARY PROCEDURE (OUTPATIENT)
Dept: ULTRASOUND IMAGING | Facility: CLINIC | Age: 45
End: 2025-02-21
Attending: OBSTETRICS & GYNECOLOGY
Payer: COMMERCIAL

## 2025-02-21 VITALS
SYSTOLIC BLOOD PRESSURE: 130 MMHG | DIASTOLIC BLOOD PRESSURE: 84 MMHG | HEIGHT: 61 IN | BODY MASS INDEX: 32.88 KG/M2 | WEIGHT: 174.16 LBS | HEART RATE: 82 BPM

## 2025-02-21 DIAGNOSIS — D25.1 INTRAMURAL LEIOMYOMA OF UTERUS: ICD-10-CM

## 2025-02-21 DIAGNOSIS — Z87.59 HISTORY OF PRE-ECLAMPSIA: ICD-10-CM

## 2025-02-21 DIAGNOSIS — E06.3 HYPOTHYROIDISM DUE TO HASHIMOTO THYROIDITIS: ICD-10-CM

## 2025-02-21 DIAGNOSIS — Z86.32 HISTORY OF GESTATIONAL DIABETES: Primary | ICD-10-CM

## 2025-02-21 DIAGNOSIS — Z86.32 HISTORY OF GESTATIONAL DIABETES: ICD-10-CM

## 2025-02-21 DIAGNOSIS — I10 PRIMARY HYPERTENSION: ICD-10-CM

## 2025-02-21 PROBLEM — O34.13 UTERINE FIBROIDS AFFECTING PREGNANCY IN THIRD TRIMESTER: Status: RESOLVED | Noted: 2024-11-07 | Resolved: 2025-02-21

## 2025-02-21 PROBLEM — D25.9 UTERINE FIBROIDS AFFECTING PREGNANCY IN THIRD TRIMESTER: Status: RESOLVED | Noted: 2024-11-07 | Resolved: 2025-02-21

## 2025-02-21 PROBLEM — O36.5990 FETAL GROWTH RESTRICTION ANTEPARTUM: Status: RESOLVED | Noted: 2024-11-07 | Resolved: 2025-02-21

## 2025-02-21 PROBLEM — O24.414 INSULIN CONTROLLED GESTATIONAL DIABETES MELLITUS (GDM) IN THIRD TRIMESTER: Status: RESOLVED | Noted: 2024-11-07 | Resolved: 2025-02-21

## 2025-02-21 PROBLEM — O09.93 SUPERVISION OF HIGH RISK PREGNANCY IN THIRD TRIMESTER: Status: RESOLVED | Noted: 2024-11-07 | Resolved: 2025-02-21

## 2025-02-21 PROBLEM — E03.9 ACQUIRED HYPOTHYROIDISM: Status: RESOLVED | Noted: 2024-11-07 | Resolved: 2025-02-21

## 2025-02-21 PROBLEM — O14.10 PREECLAMPSIA, SEVERE: Status: ACTIVE | Noted: 2024-11-22

## 2025-02-21 PROBLEM — O09.513 AMA (ADVANCED MATERNAL AGE) PRIMIGRAVIDA 35+, THIRD TRIMESTER: Status: RESOLVED | Noted: 2024-11-07 | Resolved: 2025-02-21

## 2025-02-21 LAB
ALBUMIN SERPL BCG-MCNC: 4.3 G/DL (ref 3.5–5.2)
ALP SERPL-CCNC: 129 U/L (ref 40–150)
ALT SERPL W P-5'-P-CCNC: 52 U/L (ref 0–50)
ANION GAP SERPL CALCULATED.3IONS-SCNC: 11 MMOL/L (ref 7–15)
AST SERPL W P-5'-P-CCNC: 32 U/L (ref 0–45)
BILIRUB SERPL-MCNC: 0.2 MG/DL
BUN SERPL-MCNC: 11.6 MG/DL (ref 6–20)
CALCIUM SERPL-MCNC: 9.8 MG/DL (ref 8.8–10.4)
CHLORIDE SERPL-SCNC: 106 MMOL/L (ref 98–107)
CREAT SERPL-MCNC: 0.87 MG/DL (ref 0.51–0.95)
EGFRCR SERPLBLD CKD-EPI 2021: 85 ML/MIN/1.73M2
EST. AVERAGE GLUCOSE BLD GHB EST-MCNC: 114 MG/DL
GLUCOSE SERPL-MCNC: 95 MG/DL (ref 70–99)
HBA1C MFR BLD: 5.6 %
HCO3 SERPL-SCNC: 22 MMOL/L (ref 22–29)
POTASSIUM SERPL-SCNC: 4.5 MMOL/L (ref 3.4–5.3)
PROT SERPL-MCNC: 7.5 G/DL (ref 6.4–8.3)
SODIUM SERPL-SCNC: 139 MMOL/L (ref 135–145)

## 2025-02-21 PROCEDURE — 99213 OFFICE O/P EST LOW 20 MIN: CPT | Performed by: FAMILY MEDICINE

## 2025-02-21 PROCEDURE — 83036 HEMOGLOBIN GLYCOSYLATED A1C: CPT

## 2025-02-21 PROCEDURE — 36415 COLL VENOUS BLD VENIPUNCTURE: CPT

## 2025-02-21 PROCEDURE — 76856 US EXAM PELVIC COMPLETE: CPT

## 2025-02-21 PROCEDURE — 76856 US EXAM PELVIC COMPLETE: CPT | Mod: 26 | Performed by: STUDENT IN AN ORGANIZED HEALTH CARE EDUCATION/TRAINING PROGRAM

## 2025-02-21 PROCEDURE — 76830 TRANSVAGINAL US NON-OB: CPT | Mod: 26 | Performed by: STUDENT IN AN ORGANIZED HEALTH CARE EDUCATION/TRAINING PROGRAM

## 2025-02-21 PROCEDURE — 99204 OFFICE O/P NEW MOD 45 MIN: CPT | Performed by: FAMILY MEDICINE

## 2025-02-21 PROCEDURE — 80053 COMPREHEN METABOLIC PANEL: CPT

## 2025-02-21 RX ORDER — LABETALOL 200 MG/1
200 TABLET, FILM COATED ORAL 2 TIMES DAILY
Qty: 180 TABLET | Refills: 0 | Status: SHIPPED | OUTPATIENT
Start: 2025-02-21 | End: 2025-02-23

## 2025-02-21 RX ORDER — NIFEDIPINE 30 MG
30 TABLET, EXTENDED RELEASE ORAL 2 TIMES DAILY
Qty: 180 TABLET | Refills: 1 | Status: SHIPPED | OUTPATIENT
Start: 2025-02-21

## 2025-02-21 NOTE — PROGRESS NOTES
"CC: Establish Care (HTN, type ll DM )      SUBJECTIVE: Ravi is a 44 year old female who comes in to [facility]. S/p  on 2024. Pregnancy c/b A2GDM and pre-E with SF.     Hypothyroidism. Long-standing diagnosis.   - Medication: Has been on stable dosing of levothyroxine 88  mcg daily.   - Most recent   Lab Results   Component Value Date    TSH 1.63 2025    reviewed today.    HTN. Diagnosed with gestational hypertension and then had pre-eclampsia with severe features.   - Medications: Has been treating with labetalol 200 mg BID and nifedipine 30 mg BID. No adverse medication effects.   - Checks BP occasionally 115/75 or around there.   - Most recent surveillance labs reviewed today and are notable for normal creatinine, eGFR, and sodium/potassium (2024).   - Patient denies chest pain, cough, SOB, palpitations, LE edema, PND, or orthopnea.  - Contraception: Abstinence for now. Undecided on future pregnancy plans, leaning towards no more children.   - Breastfeeding: Yes.  Hopes to BF for at least a year if not more.     History of A2 GDM.    - Fasting blood sugar last week 97.  - No prior history of prediabetes or type 2 diabetes.      SH: No tobacco use. No alcohol use. No drug use. Occupation: PhD student. Relationships: .     FH of HTN in both parents.   FH of type 2 DM in her father.       OBJECTIVE:   /84   Pulse 82   Ht 1.549 m (5' 1\")   Wt 79 kg (174 lb 2.6 oz)   BMI 32.91 kg/m    General: Alert and oriented in no acute distress.  Neuro: Grossly intact, nonfocal.  Cardio: RRR, normal S1 and S2, without murmurs, rubs, or gallops appreciated.    Resp: CTA bilaterally. Normal respiratory effort.   MSK: Distal pulses 2+ and symmetric, extremities without deformity, edema.  Psych: Mood and affect appropriate.      ASSESSMENT/PLAN:   Ravi was seen today for [facility].    History of gestational diabetes  Check A1c. Discussed need for annual DM screening in the " future.   -     Hemoglobin A1c; Future    Hypothyroidism due to Hashimoto thyroiditis  The current medical regimen is effective;  continue present plan and medications.     Primary hypertension  History of pre-eclampsia  Check renal function and electrolytes today. Continue labetalol 200 mg BID and nifedipine 30 mg BID. Will continue on this regimen while BF. Will continue to check BP at home 1-2x/week, if starts to develop lower BP or symptoms can start to reduce dose of medication.  -     Comprehensive metabolic panel; Future  -     labetalol (NORMODYNE) 200 MG tablet; Take 1 tablet (200 mg) by mouth 2 times daily.  -     NIFEdipine ER (ADALAT CC) 30 MG 24 hr tablet; Take 1 tablet (30 mg) by mouth 2 times daily.    Worrisome signs and symptoms were discussed with Ravi and she was instructed to return to the clinic for concerning symptoms or to call with questions.       Noemi Castelan MD  Family Medicine

## 2025-02-21 NOTE — PATIENT INSTRUCTIONS
Thank you for trusting us with your care!   Please be aware, if you are on Mychart, you may see your results prior to your providers review. If labs are abnormal, we will call or message you on Mychart with a follow up plan.    If you need to contact us for questions about:  Symptoms, Scheduling & Medical Questions; Non-urgent (2-3 day response) Mychart message, Urgent (needing response today) [phone] (if after 3:30pm next day response)   Prescriptions: Please call your Pharmacy   Billing: [facility] [phone] or  Physicians:[phone]    Call if you start to notice dizziness/lightheadedness, consistent BP readings <110/60, we can consider dose reduction of medication  Call if breastfeeding status changes and we will change medication plan     Patient Education   DASH Diet: Care Instructions  Your Care Instructions     The DASH diet is an eating plan that can help lower your blood pressure. DASH stands for Dietary Approaches to Stop Hypertension. Hypertension is high blood pressure.  The DASH diet focuses on eating foods that are high in calcium, potassium, and magnesium. These nutrients can lower blood pressure. The foods that are highest in these nutrients are fruits, vegetables, low-fat dairy products, nuts, seeds, and legumes. But taking calcium, potassium, and magnesium supplements instead of eating foods that are high in those nutrients does not have the same effect. The DASH diet also includes whole grains, fish, and poultry.  The DASH diet is one of several lifestyle changes your doctor may recommend to lower your high blood pressure. Your doctor may also want you to decrease the amount of sodium in your diet. Lowering sodium while following the DASH diet can lower blood pressure even further than just the DASH diet alone.  Follow-up care is a key part of your treatment and safety. Be sure to make and go to all appointments, and call your doctor if you are having problems. It's also a good idea to  know your test results and keep a list of the medicines you take.  How can you care for yourself at home?  Following the DASH diet  Eat 4 to 5 servings of fruit each day. A serving is 1 medium-sized piece of fruit, 1/2 cup raw or canned fruit, 1/4 cup dried fruit, or 4 ounces (1/2 cup) of fruit juice. Choose fruit more often than fruit juice.  Eat 4 to 5 servings of vegetables each day. A serving is 1 cup of lettuce or raw leafy vegetables, 1/2 cup of chopped or cooked vegetables, or 4 ounces (1/2 cup) of vegetable juice. Choose vegetables more often than vegetable juice.  Get 2 to 3 servings of low-fat and fat-free dairy each day. A serving is 8 ounces of milk, 1 cup of yogurt, or 1  ounces of cheese.  Eat 6 to 8 servings of grains each day. A serving is 1 slice of bread, 1 ounce of dry cereal, or 1/2 cup of cooked rice, pasta, or cooked cereal. Try to choose whole-grain products as much as possible.  Limit lean meat, poultry, and fish to 6 ounces or less each day. One egg counts as 1 ounce.  Eat 4 to 5 servings of nuts, seeds, and legumes (cooked dried beans, lentils, and split peas) each week. A serving is 1/3 cup of nuts, 2 tablespoons of seeds, 2 tablespoons of peanut butter, or 1/2 cup of cooked beans or peas.  Limit fats and oils to 2 to 3 servings each day. A serving is 1 teaspoon of vegetable oil or 2 tablespoons of salad dressing.  Limit sweets and added sugars to 5 servings or less a week. A serving is 1 tablespoon jelly or jam, 1/2 cup sorbet, or 1 cup of lemonade.  Eat less than 2,300 milligrams (mg) of sodium a day. If you limit your sodium to 1,500 mg a day, you can lower your blood pressure even more.  Be aware that all of these are the suggested number of servings for people who eat 1,800 to 2,000 calories a day. Your recommended number of servings may be different if you need more or fewer calories.  Tips for success  Start small. Make small changes, and stick with them. Once those changes become  "habit, add a few more changes.  Try some of the following:  Make it a goal to eat a fruit or vegetable at every meal and at snacks. This will make it easy to get the recommended amount of fruits and vegetables each day.  Try yogurt topped with fruit and nuts for a snack or healthy dessert.  Add lettuce, tomato, cucumber, and onion to sandwiches.  Have a variety of cut-up vegetables with a low-fat dip as an appetizer instead of chips and dip.  Sprinkle sunflower seeds or chopped almonds over salads. Or try adding chopped walnuts or almonds to cooked vegetables.  Try some vegetarian meals using beans and peas. Add garbanzo or kidney beans to salads. Make burritos and tacos with mashed calle beans or black beans.  Where can you learn more?  Go to https://www.[employer].net/patiented  Enter H967 in the search box to learn more about \"DASH Diet: Care Instructions.\"  Current as of: September 20, 2023  Content Version: 14.3    2024 [employer].   Care instructions adapted under license by your healthcare professional. If you have questions about a medical condition or this instruction, always ask your healthcare professional. [employer] disclaims any warranty or liability for your use of this information.       " yes

## 2025-02-23 ENCOUNTER — MYC REFILL (OUTPATIENT)
Dept: FAMILY MEDICINE | Facility: CLINIC | Age: 45
End: 2025-02-23
Payer: COMMERCIAL

## 2025-02-23 DIAGNOSIS — I10 PRIMARY HYPERTENSION: ICD-10-CM

## 2025-02-24 RX ORDER — LABETALOL 200 MG/1
200 TABLET, FILM COATED ORAL 2 TIMES DAILY
Qty: 180 TABLET | Refills: 3 | Status: SHIPPED | OUTPATIENT
Start: 2025-02-24

## 2025-05-06 ENCOUNTER — VIRTUAL VISIT (OUTPATIENT)
Dept: ENDOCRINOLOGY | Facility: CLINIC | Age: 45
End: 2025-05-06
Payer: COMMERCIAL

## 2025-05-06 DIAGNOSIS — Z86.32 HISTORY OF GESTATIONAL DIABETES: Primary | ICD-10-CM

## 2025-05-06 DIAGNOSIS — E06.3 HYPOTHYROIDISM DUE TO HASHIMOTO THYROIDITIS: ICD-10-CM

## 2025-05-06 RX ORDER — LEVOTHYROXINE SODIUM 88 UG/1
88 TABLET ORAL DAILY
Qty: 90 TABLET | Refills: 3 | Status: SHIPPED | OUTPATIENT
Start: 2025-05-06

## 2025-05-06 NOTE — PROGRESS NOTES
"  Video-Visit Details    Type of service:  Video Visit  Video Start Time: 8:55  Video End Time: 9:08  Originating Location (pt. Location): [place], [place]  Distant Location (provider location):  Home  Platform used for Video Visit: Quentin Calloway MD    Ravi Peñaloza is a 44 year old year old female here for follow-up of  diabetes in pregnancy and hypothyroidism via a billable video visit.  Last seen by me Nov 2024    1) Diabetes Mellitus in pregnancy    Diabetes History:  Diagnosis: No previous history of DM, but was overweight so tested for GDM at 16w and diagnosed with this. Initially managed with dietary changes, in October noted fasting was becoming elevated. Started on NPH 8u at bedtime at that time. Was living in [place] and moved now to [place]. Has been diagnosed with fetal growth restriction (ultrasound 10/30-- EFW 3%). She delivered 11/27/24 at 33w6d for preeclampsia with severe features. Baby girl went to NICU for 30 days and now doing well. Baby born weighing 1470g  Hospitalizations: none  Previous Regimens: NPH nocturnal only   Current Regimen: none      BG check- none    INTERVAL HISTORY:  - Has lost weight at all, but has not resumed physical activity  - BP has remained elevated/stayed on BP meds  - One BG check since delivery- <95 fasting    A1C- 5.6% at 2/21/2025        2) Hypothyroid  HPI: Diagnosed in 2008- on LT4 since there  Pregnancy dose: 112mcg daily plus 1/2 tablet on Tuesday Ave daily dose 120mcg  Current dose- 88mcg  *Takes first thing in the morning, separate from other things by 4hr. Has been burdensome to do this      10/20/2024- [facility] (CareEverywhere)  TSH- 0.823  Free T4- 1.14    TSH   Date Value Ref Range Status   01/06/2025 1.63 0.30 - 4.20 uIU/mL Final     No results found for: \"T4\"          BP Readings from Last 3 Encounters:   02/21/25 130/84   12/09/24 118/87   12/03/24 90/65       No results found for: \"A1C\"    Wt Readings from Last 3 Encounters: " "  02/21/25 79 kg (174 lb 2.6 oz)   12/09/24 81 kg (178 lb 9.6 oz)   12/01/24 82.2 kg (181 lb 3.2 oz)       Current Outpatient Medications   Medication Sig Dispense Refill    acetone urine (KETOSTIX) test strip Check ketones once daily in urine 100 strip 2    labetalol (NORMODYNE) 200 MG tablet Take 1 tablet (200 mg) by mouth 2 times daily. 180 tablet 3    levothyroxine (SYNTHROID/LEVOTHROID) 88 MCG tablet Take 1 tablet (88 mcg) by mouth daily. 90 tablet 3    NIFEdipine ER (ADALAT CC) 30 MG 24 hr tablet Take 1 tablet (30 mg) by mouth 2 times daily. 180 tablet 1          REVIEW OF SYSTEMS:   ROS: 10 point ROS neg other than the symptoms noted above in the HPI.      EXAM:  Physical Exam (visual exam)  VS:  no vital signs taken for video visit  CONSTITUTIONAL: healthy, alert and NAD, responding appropriately  ENT: normocephalic, no visual evidence of trauma, normal nose and oral mucosa  EYES: conjunctivae and sclerae normal, no exophthalmos or proptosis  THYROID:  no visualized nodules or goiter  LUNGS: no audible wheeze, cough or visible cyanosis, no visible retractions or increased work of breathing  EXTREMITIES: no hand tremors  NEUROLOGY: cranial nerves grossly intact with no obvious deficit.  SKIN:  no visualized skin lesions or rash, no edema visualized  PSYCH: mentation appears normal, normal judgement      ASSESSMENT/PLAN:  (O24.414) Insulin controlled gestational diabetes mellitus (GDM) in third trimester  (E03.9) Hypothyroidism    1) h/o gestational diabetes  - Discussed today patient has a 50-60% lifetime risk of progression to diabetes given history of gestational diabetes. I have recommended continued intensive lifestyle modifications including continuing \"gestational diabetes diet\" with minimal processed foods, whole foods including whole grains, fruits, veggies, lean meats. 20-30 min exercise daily. These have been shown to be 50% effective in reducing progression to diabetes. An alternative is metformin, " which was shown to reduce progression to diabetes by 30%.   -She is breastfeeding, which I strongly encourage for metabolic benefits to mother and baby.   - I have recommended she have A1C completed NOW and in one year, either with me or PCP. She can then continue screening every 1-3 years moving forward.       2) Hypothyroidism  - At goal based on recent TFTs in Jan  - restarted 88mcg following   - Retest levels annually    RTC- PRN or annually     A total of 20 minutes were spent today 05/06/25 on this visit including chart review, history and counseling, documentation and other activities as detailed above.

## 2025-05-06 NOTE — LETTER
5/6/2025      Ravi Peñaloza  [address]  [facility] [address]      Dear Colleague,    Thank you for referring your patient, Ravi Peñaloza, to the [facility] [place]. Please see a copy of my visit note below.      Video-Visit Details    Type of service:  Video Visit  Video Start Time: 8:55  Video End Time: 9:08  Originating Location (pt. Location): [place], [place]  Distant Location (provider location):  Home  Platform used for Video Visit: Quentin Calloway MD    Ravi Peñaloza is a 44 year old year old female here for follow-up of  diabetes in pregnancy and hypothyroidism via a billable video visit.  Last seen by me Nov 2024    1) Diabetes Mellitus in pregnancy    Diabetes History:  Diagnosis: No previous history of DM, but was overweight so tested for GDM at 16w and diagnosed with this. Initially managed with dietary changes, in October noted fasting was becoming elevated. Started on NPH 8u at bedtime at that time. Was living in [place] and moved now to [place]. Has been diagnosed with fetal growth restriction (ultrasound 10/30-- EFW 3%). She delivered 11/27/24 at 33w6d for preeclampsia with severe features. Baby girl went to NICU for 30 days and now doing well. Baby born weighing 1470g  Hospitalizations: none  Previous Regimens: NPH nocturnal only   Current Regimen: none      BG check- none    INTERVAL HISTORY:  - Has lost weight at all, but has not resumed physical activity  - BP has remained elevated/stayed on BP meds  - One BG check since delivery- <95 fasting    A1C- 5.6% at 2/21/2025        2) Hypothyroid  HPI: Diagnosed in 2008- on LT4 since there  Pregnancy dose: 112mcg daily plus 1/2 tablet on Tuesday [address] daily dose 120mcg  Current dose- 88mcg  *Takes first thing in the morning, separate from other things by 4hr. Has been burdensome to do this      10/20/2024- [facility] (CareEverywhere)  TSH- 0.823  Free T4- 1.14    TSH   Date Value Ref Range Status  "  01/06/2025 1.63 0.30 - 4.20 uIU/mL Final     No results found for: \"T4\"          BP Readings from Last 3 Encounters:   02/21/25 130/84   12/09/24 118/87   12/03/24 90/65       No results found for: \"A1C\"    Wt Readings from Last 3 Encounters:   02/21/25 79 kg (174 lb 2.6 oz)   12/09/24 81 kg (178 lb 9.6 oz)   12/01/24 82.2 kg (181 lb 3.2 oz)       Current Outpatient Medications   Medication Sig Dispense Refill     acetone urine (KETOSTIX) test strip Check ketones once daily in urine 100 strip 2     labetalol (NORMODYNE) 200 MG tablet Take 1 tablet (200 mg) by mouth 2 times daily. 180 tablet 3     levothyroxine (SYNTHROID/LEVOTHROID) 88 MCG tablet Take 1 tablet (88 mcg) by mouth daily. 90 tablet 3     NIFEdipine ER (ADALAT CC) 30 MG 24 hr tablet Take 1 tablet (30 mg) by mouth 2 times daily. 180 tablet 1          REVIEW OF SYSTEMS:   ROS: 10 point ROS neg other than the symptoms noted above in the HPI.      EXAM:  Physical Exam (visual exam)  VS:  no vital signs taken for video visit  CONSTITUTIONAL: healthy, alert and NAD, responding appropriately  ENT: normocephalic, no visual evidence of trauma, normal nose and oral mucosa  EYES: conjunctivae and sclerae normal, no exophthalmos or proptosis  THYROID:  no visualized nodules or goiter  LUNGS: no audible wheeze, cough or visible cyanosis, no visible retractions or increased work of breathing  EXTREMITIES: no hand tremors  NEUROLOGY: cranial nerves grossly intact with no obvious deficit.  SKIN:  no visualized skin lesions or rash, no edema visualized  PSYCH: mentation appears normal, normal judgement      ASSESSMENT/PLAN:  (O24.414) Insulin controlled gestational diabetes mellitus (GDM) in third trimester  (E03.9) Hypothyroidism    1) h/o gestational diabetes  - Discussed today patient has a 50-60% lifetime risk of progression to diabetes given history of gestational diabetes. I have recommended continued intensive lifestyle modifications including continuing " "\"gestational diabetes diet\" with minimal processed foods, whole foods including whole grains, fruits, veggies, lean meats. 20-30 min exercise daily. These have been shown to be 50% effective in reducing progression to diabetes. An alternative is metformin, which was shown to reduce progression to diabetes by 30%.   -She is breastfeeding, which I strongly encourage for metabolic benefits to mother and baby.   - I have recommended she have A1C completed NOW and in one year, either with me or PCP. She can then continue screening every 1-3 years moving forward.       2) Hypothyroidism  - At goal based on recent TFTs in Jan  - restarted 88mcg following   - Retest levels annually    RTC- PRN or annually     A total of 20 minutes were spent today 05/06/25 on this visit including chart review, history and counseling, documentation and other activities as detailed above.         Again, thank you for allowing me to participate in the care of your patient.        Sincerely,        Miranda Calloway MD    Electronically signed"

## 2025-06-04 ENCOUNTER — MYC REFILL (OUTPATIENT)
Dept: ENDOCRINOLOGY | Facility: CLINIC | Age: 45
End: 2025-06-04
Payer: COMMERCIAL

## 2025-06-04 DIAGNOSIS — E06.3 HYPOTHYROIDISM DUE TO HASHIMOTO THYROIDITIS: ICD-10-CM

## 2025-06-04 RX ORDER — LEVOTHYROXINE SODIUM 88 UG/1
88 TABLET ORAL DAILY
Qty: 90 TABLET | Refills: 3 | Status: CANCELLED | OUTPATIENT
Start: 2025-06-04

## 2025-07-02 ENCOUNTER — MYC REFILL (OUTPATIENT)
Dept: ENDOCRINOLOGY | Facility: CLINIC | Age: 45
End: 2025-07-02
Payer: COMMERCIAL

## 2025-07-02 ENCOUNTER — MYC REFILL (OUTPATIENT)
Dept: FAMILY MEDICINE | Facility: CLINIC | Age: 45
End: 2025-07-02
Payer: COMMERCIAL

## 2025-07-02 DIAGNOSIS — I10 PRIMARY HYPERTENSION: ICD-10-CM

## 2025-07-02 DIAGNOSIS — E06.3 HYPOTHYROIDISM DUE TO HASHIMOTO THYROIDITIS: ICD-10-CM

## 2025-07-02 RX ORDER — LEVOTHYROXINE SODIUM 88 UG/1
88 TABLET ORAL DAILY
Qty: 90 TABLET | Refills: 3 | Status: CANCELLED | OUTPATIENT
Start: 2025-07-02

## 2025-07-03 RX ORDER — NIFEDIPINE 30 MG
30 TABLET, EXTENDED RELEASE ORAL 2 TIMES DAILY
Qty: 180 TABLET | Refills: 1 | Status: SHIPPED | OUTPATIENT
Start: 2025-07-03

## 2025-07-03 NOTE — TELEPHONE ENCOUNTER
"Received refill request for nifedipine. Pt last seen in clinic 2/21/25.    Notes from visit \"    ASSESSMENT/PLAN:   Ravi was seen today for establish care.     History of gestational diabetes  Check A1c. Discussed need for annual DM screening in the future.   -     Hemoglobin A1c; Future     Hypothyroidism due to Hashimoto thyroiditis  The current medical regimen is effective;  continue present plan and medications.      Primary hypertension  History of pre-eclampsia  Check renal function and electrolytes today. Continue labetalol 200 mg BID and nifedipine 30 mg BID. Will continue on this regimen while BF. Will continue to check BP at home 1-2x/week, if starts to develop lower BP or symptoms can start to reduce dose of medication.  -     Comprehensive metabolic panel; Future  -     labetalol (NORMODYNE) 200 MG tablet; Take 1 tablet (200 mg) by mouth 2 times daily.  -     NIFEdipine ER (ADALAT CC) 30 MG 24 hr tablet; Take 1 tablet (30 mg) by mouth 2 times daily.     Worrisome signs and symptoms were discussed with Ravi and she was instructed to return to the clinic for concerning symptoms or to call with questions.      Pended to Dr. Castelan.   "

## 2025-08-10 ENCOUNTER — HEALTH MAINTENANCE LETTER (OUTPATIENT)
Age: 45
End: 2025-08-10

## (undated) DEVICE — DRSG STERI STRIP 1/4X3" R1541

## (undated) DEVICE — SOL NACL 0.9% IRRIG 1000ML BOTTLE 07138-09

## (undated) DEVICE — Device

## (undated) DEVICE — STOCKING SLEEVE COMPRESSION CALF LG

## (undated) DEVICE — DRSG ABDOMINAL 07 1/2X8" 7197D

## (undated) DEVICE — PREP CHLORAPREP 26ML TINTED ORANGE  260815

## (undated) DEVICE — STRAP KNEE/BODY 31143004

## (undated) DEVICE — PACK C-SECTION LF PL15OTA83B

## (undated) DEVICE — SU VICRYL 0 CT-1 36" J346H

## (undated) DEVICE — CATH TRAY FOLEY SURESTEP 16FR WDRAIN BAG STLK LATEX A300316A

## (undated) DEVICE — SU VICRYL 3-0 CTX 36" UND J980H

## (undated) DEVICE — SOL WATER IRRIG 1000ML BOTTLE 07139-09

## (undated) RX ORDER — OXYTOCIN/0.9 % SODIUM CHLORIDE 30/500 ML
PLASTIC BAG, INJECTION (ML) INTRAVENOUS
Status: DISPENSED
Start: 2024-11-27

## (undated) RX ORDER — EPINEPHRINE 1 MG/ML
INJECTION, SOLUTION, CONCENTRATE INTRAVENOUS
Status: DISPENSED
Start: 2024-11-27

## (undated) RX ORDER — MORPHINE SULFATE 1 MG/ML
INJECTION, SOLUTION EPIDURAL; INTRATHECAL; INTRAVENOUS
Status: DISPENSED
Start: 2024-11-27

## (undated) RX ORDER — FENTANYL CITRATE 50 UG/ML
INJECTION, SOLUTION INTRAMUSCULAR; INTRAVENOUS
Status: DISPENSED
Start: 2024-11-27